# Patient Record
Sex: FEMALE | Race: WHITE | Employment: OTHER | URBAN - METROPOLITAN AREA
[De-identification: names, ages, dates, MRNs, and addresses within clinical notes are randomized per-mention and may not be internally consistent; named-entity substitution may affect disease eponyms.]

---

## 2018-10-18 PROBLEM — E11.8 TYPE 2 DIABETES MELLITUS WITH COMPLICATION, WITH LONG-TERM CURRENT USE OF INSULIN (HCC): Status: ACTIVE | Noted: 2018-10-18

## 2018-10-18 PROBLEM — H92.02 LEFT EAR PAIN: Status: ACTIVE | Noted: 2018-10-18

## 2018-10-18 PROBLEM — Z79.4 TYPE 2 DIABETES MELLITUS WITH COMPLICATION, WITH LONG-TERM CURRENT USE OF INSULIN (HCC): Status: ACTIVE | Noted: 2018-10-18

## 2021-07-23 LAB — PAP SMEAR, EXTERNAL: NEGATIVE

## 2021-09-01 ENCOUNTER — OFFICE VISIT (OUTPATIENT)
Dept: FAMILY MEDICINE CLINIC | Age: 61
End: 2021-09-01
Payer: COMMERCIAL

## 2021-09-01 VITALS
HEIGHT: 66 IN | SYSTOLIC BLOOD PRESSURE: 140 MMHG | TEMPERATURE: 98.1 F | OXYGEN SATURATION: 99 % | WEIGHT: 150.38 LBS | RESPIRATION RATE: 18 BRPM | DIASTOLIC BLOOD PRESSURE: 78 MMHG | BODY MASS INDEX: 24.17 KG/M2 | HEART RATE: 68 BPM

## 2021-09-01 DIAGNOSIS — E06.3 HYPOTHYROIDISM DUE TO HASHIMOTO'S THYROIDITIS: ICD-10-CM

## 2021-09-01 DIAGNOSIS — M54.42 CHRONIC RIGHT-SIDED LOW BACK PAIN WITH BILATERAL SCIATICA: Primary | ICD-10-CM

## 2021-09-01 DIAGNOSIS — M54.41 CHRONIC RIGHT-SIDED LOW BACK PAIN WITH BILATERAL SCIATICA: Primary | ICD-10-CM

## 2021-09-01 DIAGNOSIS — I10 ESSENTIAL HYPERTENSION: ICD-10-CM

## 2021-09-01 DIAGNOSIS — E03.8 HYPOTHYROIDISM DUE TO HASHIMOTO'S THYROIDITIS: ICD-10-CM

## 2021-09-01 DIAGNOSIS — E10.40 TYPE 1 DIABETES MELLITUS WITH DIABETIC NEUROPATHY (HCC): ICD-10-CM

## 2021-09-01 DIAGNOSIS — G89.29 CHRONIC RIGHT-SIDED LOW BACK PAIN WITH BILATERAL SCIATICA: Primary | ICD-10-CM

## 2021-09-01 PROBLEM — Z87.312 HISTORY OF STRESS FRACTURE: Status: ACTIVE | Noted: 2021-09-01

## 2021-09-01 PROBLEM — H92.02 LEFT EAR PAIN: Status: RESOLVED | Noted: 2018-10-18 | Resolved: 2021-09-01

## 2021-09-01 PROBLEM — R00.2 PALPITATIONS: Status: ACTIVE | Noted: 2021-09-01

## 2021-09-01 PROCEDURE — 99214 OFFICE O/P EST MOD 30 MIN: CPT | Performed by: NURSE PRACTITIONER

## 2021-09-01 RX ORDER — LANCETS
EACH MISCELLANEOUS
Qty: 200 EACH | Refills: 11 | Status: SHIPPED | OUTPATIENT
Start: 2021-09-01

## 2021-09-01 RX ORDER — PEN NEEDLE, DIABETIC 30 GX3/16"
NEEDLE, DISPOSABLE MISCELLANEOUS
Qty: 100 EACH | Refills: 11 | Status: SHIPPED | OUTPATIENT
Start: 2021-09-01

## 2021-09-01 RX ORDER — LISINOPRIL 10 MG/1
TABLET ORAL
Qty: 90 TABLET | Refills: 1 | Status: SHIPPED | OUTPATIENT
Start: 2021-09-01 | End: 2022-06-20

## 2021-09-01 RX ORDER — INSULIN LISPRO 100 [IU]/ML
INJECTION, SOLUTION INTRAVENOUS; SUBCUTANEOUS
Qty: 5 PEN | Refills: 0 | Status: SHIPPED | OUTPATIENT
Start: 2021-09-01

## 2021-09-01 RX ORDER — VERAPAMIL HYDROCHLORIDE 180 MG/1
180 TABLET, EXTENDED RELEASE ORAL
Qty: 90 TABLET | Refills: 1 | Status: SHIPPED | OUTPATIENT
Start: 2021-09-01 | End: 2021-12-09 | Stop reason: ALTCHOICE

## 2021-09-01 RX ORDER — INSULIN GLARGINE 100 [IU]/ML
INJECTION, SOLUTION SUBCUTANEOUS
Qty: 15 ML | Refills: 0 | Status: SHIPPED | OUTPATIENT
Start: 2021-09-01

## 2021-09-01 RX ORDER — LEVOTHYROXINE SODIUM 137 UG/1
TABLET ORAL
Qty: 90 TABLET | Refills: 0 | Status: SHIPPED | OUTPATIENT
Start: 2021-09-01 | End: 2022-07-08 | Stop reason: SDUPTHER

## 2021-09-01 RX ORDER — INSULIN PUMP SYRINGE, 3 ML
EACH MISCELLANEOUS
Qty: 1 KIT | Refills: 0 | Status: SHIPPED | OUTPATIENT
Start: 2021-09-01

## 2021-09-01 RX ORDER — LISINOPRIL 5 MG/1
TABLET ORAL
Qty: 90 TABLET | Refills: 1 | Status: SHIPPED | OUTPATIENT
Start: 2021-09-01 | End: 2021-11-29 | Stop reason: SDUPTHER

## 2021-09-01 RX ORDER — CHOLECALCIFEROL (VITAMIN D3) 125 MCG
2000 CAPSULE ORAL DAILY
Qty: 90 TABLET | Refills: 1 | Status: SHIPPED | OUTPATIENT
Start: 2021-09-01

## 2021-09-01 RX ORDER — MELOXICAM 7.5 MG/1
7.5 TABLET ORAL
Qty: 30 TABLET | Refills: 1 | Status: SHIPPED | OUTPATIENT
Start: 2021-09-01 | End: 2021-09-20

## 2021-09-01 NOTE — PROGRESS NOTES
Subjective:     Chief Complaint   Patient presents with    Back Pain     wants PT    Foot Pain    Diabetes     Sees Endocrine    Thyroid Problem     Sees Endocrine       Babs Lemus is a 59 y.o. female who presents today with multiple complaints. She is transferring her care from the [facility] today. She and her  moved down here full time earlier this year into their second home in [place]. They used to live in [place]. It has been a big adjustment and very stressful over the past few months. Still trying to sell their house in [place].    She is c/o chronic right lower back pain with bilateral sciatica, worse in the right leg and foot. Pain started back in February after she went sledding. Denies any fall or injury. Back in [place] she saw a spine specialist who ordered an MRI. She was told she has multiple bulging discs at L3-5. She was offered a steroid injection but declined due to her diabetes. She was advised to do PT but never did. She was also advised to take aleve prn pain. Pt states she does not take the Aleve often because it causes her BP to go up and it \"destroys her stomach\" if she takes it too frequently. Has tried heating pad, ice pack, and yoga exercises but pain will not go away. Has been seeing a chiropractor which has helped some, her right foot was swollen for a while and after her visit with him the swelling dramatically improved. She used to walk almost 2 miles a day with her dog for excercise but has been unable to these days due to pain. She is ambulating with a cane today. She mentions she has OA in both hips. She also mentions she had a stress fx in the left foot back in 2013, had a DEXA done - unsure of results but she does take Vit D 2000 units daily. She has HTN with hx of palpitations and was followed by cardiology in [place]. She would like to see a cardiologist locally. She takes Verapamil 180mg daily and Lisinopril 15mg daily.  Could not tolerate Losartan. Follows a low sodium diet. Occasional lightheadedness when her BP drops too low. BP today slightly elevated at 142/65. It was rechecked manually at 140/78. She states she has white coat syndrome and is stressed today because of the bad weather. She checks it at home and readings are usually much lower. She has type 1 diabetes, diagnosed as a child. She is followed by ENDO. She is on insulin. She believes her last A1C was in the low 7's and her ENDO is ok with this due to her age. She was having episodes of hypoglycemia. She has some numbness and tingling in both feet. Takes Lipoic acid. Tries to follow a strict low carb, sugar free diet. She does have 2 glasses of wine every evening and does not wish to cut back. She has hx of Hashimoto's hypothyroidism and is followed by ENDO. She is currently taking Levothyroxine 137mcg daily 6 days per week. Believes her last TSH was checked in June. Plans on finding a new endocrinologist in the area.     Healthcare maintenance  She is hesitant to get the Covid vaccine, worried about adverse reaction given her autoimmune disorder, the fact that she had a bad experience with flu shot in the past, and anaphylactix to iodine contrast.     Patient Active Problem List   Diagnosis Code    MVP (mitral valve prolapse) I34.1    Essential hypertension I10    Type 1 diabetes mellitus with diabetic neuropathy ([facility] [address] E10.40    Hypothyroidism due to Hashimoto's thyroiditis E03.8, E06.3    Palpitations R00.2    Chronic right-sided low back pain with bilateral sciatica M54.41, M54.42, G89.29    History of stress fracture Z87.312         Past Medical History:   Diagnosis Date    Diabetes ([address]     Hypertension     Hypothyroidism due to Hashimoto's thyroiditis 1999         Current Outpatient Medications:     lisinopriL (PRINIVIL, ZESTRIL) 5 mg tablet, , Disp: , Rfl:     Insulin Needles, Disposable, (NovoFine 30) 30 gauge x 1/3\", NovoFine 30 30 gauge x 1/3\" needle, Disp: , Rfl:     ONETOUCH ULTRA BLUE TEST STRIP strip, , Disp: , Rfl:     insulin glargine (LANTUS) 100 unit/mL injection, 18 Units by SubCUTAneous route daily. , Disp: , Rfl:     insulin lispro (HUMALOG U-100 INSULIN) 100 unit/mL injection, by SubCUTAneous route., Disp: , Rfl:     levothyroxine (SYNTHROID) 137 mcg tablet, Take  by mouth Daily (before breakfast). , Disp: , Rfl:     verapamil ER (CALAN-SR) 180 mg CR tablet, Take 180 mg by mouth nightly., Disp: , Rfl:     cholecalciferol, vitamin D3, (VITAMIN D3) 2,000 unit tab, Take 2,000 Units by mouth daily. , Disp: , Rfl:     PREMARIN 0.625 mg/gram vaginal cream, , Disp: , Rfl:     magnesium 250 mg tab, Take 250 mg by mouth daily. (Patient not taking: Reported on 2021), Disp: , Rfl:     Allergies   Allergen Reactions    Iodine Anaphylaxis    Sulfa (Sulfonamide Antibiotics) Angioedema       Past Surgical History:   Procedure Laterality Date    HX CARPAL TUNNEL RELEASE      HX  SECTION         Social History     Tobacco Use   Smoking Status Never Smoker   Smokeless Tobacco Never Used       Social History     Socioeconomic History    Marital status:      Spouse name: Not on file    Number of children: Not on file    Years of education: Not on file    Highest education level: Not on file   Tobacco Use    Smoking status: Never Smoker    Smokeless tobacco: Never Used   Substance and Sexual Activity    Alcohol use:  Yes     Alcohol/week: 0.8 standard drinks     Types: 1 Glasses of wine per week    Drug use: Never    Sexual activity: Yes   Other Topics Concern     Service No    Blood Transfusions No    Caffeine Concern No    Occupational Exposure No    Hobby Hazards No    Sleep Concern No    Stress Concern No    Weight Concern No    Special Diet No    Back Care No    Exercise Yes    Bike Helmet No    Seat Belt Yes    Self-Exams Yes     Social Determinants of Health     Financial Resource Strain:     Difficulty of Paying Living Expenses:    Food Insecurity:     Worried About Running Out of Food in the Last Year:     [address] in the Last Year:    Transportation Needs:     Lack of Transportation (Medical):  Lack of Transportation (Non-Medical):    Physical Activity:     Days of Exercise per Week:     Minutes of Exercise per Session:    Stress:     Feeling of Stress :    Social Connections:     Frequency of Communication with Friends and Family:     Frequency of Social Gatherings with Friends and Family:     Attends Zoroastrian Services:     Active Member of Clubs or Organizations:     Attends Club or Organization Meetings:     Marital Status:        Family History   Problem Relation Age of Onset    Asthma Father     Diabetes Father     Heart Disease Father     Hypertension Father     Hypertension Sister     Diabetes Son        ROS:  Gen: denies fever, chills, or fatigue  HEENT:denies H/A, nasal congestion, or sore throat  Resp: denies dyspnea, cough, or wheezing  CV: denies chest pain or pressure, +occas palpitations  Extremeties: +previous edema in left foot- now resolved after chiropractor visit, no pallor, or cyanosis  Musculoskeletal: +chronic right low back pain with bilateral sciatica and muscle cramps   Neuro: + numbness/tingling in BLE's , +occas dizziness  Skin: denies rashes or new lesions   Psych: denies anxiety, depression, kierra, or other changes in mood      Objective:     Visit Vitals  BP (!) 140/78   Pulse 68   Temp 98.1 °F (36.7 °C) (Temporal)   Resp 18   Ht 5' 6\" (1.676 m)   Wt 150 lb 6 oz (68.2 kg)   SpO2 99%   BMI 24.27 kg/m²       Body mass index is 24.27 kg/m². General: Alert and oriented. +Wincing in pain when she moves in her chair. Well nourished. HEENT :  Eyes: Sclera white, conjunctiva clear. PERRLA. Extra ocular movements intact. Nose: Patent. Neck: Supple with FROM. Lungs: Breathing even and unlabored.  All lobes clear to auscultation bilaterally Heart :RRR, S1 and S2 normal intensity, no extra heart sounds  Extremities: Non-edematous, no pallor or cyanosis. Feet are warm  Back: + tenderness to palpation over the lumbar spine. No paraspinal tenderness. Limited ROM due to pain. Musculoskeletal: No joint heat, erythema, or swelling. FROM in all joints. Neuro: Cranial nerves grossly normal.  Sensory: Sensation slightly decreased to lower legs. Motor: Strength 5 over 5 and symmetrical in BLE's. No tremor. Psych: Mood and thought content appropriate for situation. Dressed appropriately and with good hygiene. Skin: Warm, dry, and intact. No lesions or discoloration. Assessment/ Plan:     Chronic right lower back pain with bilateral sciatica  Try Meloxicam 7.5mg daily- side effects reviewed  Cont ice pack, heating pad, and yoga  May cont to see chiropractor  Referred to PT- Naresh in [place]  F/U 1 month    HTN  BP borderline  She reports white coat syndrome  Cont Current meds  Cont low sodium diet  Cont to check BP at home and notify provider if >140/90  Go to ER or RTO for CP, SOB, dizziness, or swelling  She would like to see a cardiologist- she will call Dr Bev Bolton office and ask if they take her insurance and if they do she will let us know and we will send over a referral order.  If not in network, she will check with her insurance company  F/U 1 month, will check CMP    Hypothyroidism  Cont current dose of Levothyroxine  She is working on finding an Endo in the area  F/U 1 month, will recheck TSH    Type 1 diabetes  Stable  Cont current insulin regimen:  Lantus 18 units SQ daily  Humalog- TID qAC according to sliding scale  Script sent for glucometer, test strips, and lancets per pt request  Check glucose 4-5 times daily  Cont diabetic diet  Call for any problems  F/U 1 month to recheck CBC, CMP, A1C, and screen for microalbumin    Healthcare maintenance  Due to time constraints we did not address date of last PAP, colonoscopy, or mammogram. Will inquire about these at follow up appt      Verbal and written instructions (see AVS) provided.  Patient expresses understanding of diagnosis and treatment plan. Health Maintenance Due   Topic Date Due    Hepatitis C Screening  Never done    Foot Exam Q1  Never done    A1C test (Diabetic or Prediabetic)  Never done    MICROALBUMIN Q1  Never done    Eye Exam Retinal or Dilated  Never done    Lipid Screen  Never done    COVID-19 Vaccine (1) Never done    PAP AKA CERVICAL CYTOLOGY  Never done    Colorectal Cancer Screening Combo  Never done    Shingrix Vaccine Age 50> (1 of 2) Never done    Breast Cancer Screen Mammogram  Never done    DTaP/Tdap/Td series (1 - Tdap) 07/21/2020    Flu Vaccine (1) Never done               Amol Soria, JENY

## 2021-09-01 NOTE — PROGRESS NOTES
1. Have you been to the ER, urgent care clinic since your last visit? Hospitalized since your last visit? No    2. Have you seen or consulted any other health care providers outside of the [address] [place] [address] since your last visit? Include any pap smears or colon screening. Dr. Bro Strong, Dr. Trevon Ernst, Dr. Roselia Roblero.     Chief Complaint   Patient presents with    Back Pain     wants PT    Foot Pain    Diabetes     Sees Endocrine    Thyroid Problem     Sees Endocrine     Visit Vitals  BP (!) 142/65 (BP 1 Location: Left arm, BP Patient Position: Sitting)   Pulse 68   Temp 98.1 °F (36.7 °C) (Temporal)   Resp 18   Ht 5' 6\" (1.676 m)   Wt 150 lb 6 oz (68.2 kg)   SpO2 99%   BMI 24.27 kg/m²

## 2021-09-01 NOTE — PATIENT INSTRUCTIONS
Learning About How to Have a Healthy Back  What causes back pain? Back pain is often caused by overuse, strain, or injury. For example, people often hurt their backs playing sports or working in the yard, being jolted in a car accident, or lifting something too heavy. Aging plays a part too. Your bones and muscles tend to lose strength as you age, which makes injury more likely. The spongy discs between the bones of the spine (vertebrae) may suffer from wear and tear and no longer provide enough cushion between the bones. A disc that bulges or breaks open (herniated disc) can press on nerves, causing back pain. In some people, back pain is the result of arthritis, broken vertebrae caused by bone loss (osteoporosis), illness, or a spine problem. Although most people have back pain at one time or another, there are steps you can take to make it less likely. How can you have a healthy back? Reduce stress on your back through good posture   Slumping or slouching alone may not cause low back pain. But after the back has been strained or injured, bad posture can make pain worse. · Sleep in a position that maintains your back's normal curves and on a mattress that feels comfortable. Sleep on your side with a pillow between your knees, or sleep on your back with a pillow under your knees. These positions can reduce strain on your back. · Stand and sit up straight. \"Good posture\" generally means your ears, shoulders, and hips are in a straight line. · If you must stand for a long time, put one foot on a stool, ledge, or box. Switch feet every now and then. · Sit in a chair that is low enough to let you place both feet flat on the floor with both knees nearly level with your hips. If your chair or desk is too high, use a footrest to raise your knees. Place a small pillow, a rolled-up towel, or a lumbar roll in the curve of your back if you need extra support.   · Try a kneeling chair, which helps tilt your hips forward. This takes pressure off your lower back. · Try sitting on an exercise ball. It can rock from side to side, which helps keep your back loose. · When driving, keep your knees nearly level with your hips. Sit straight, and drive with both hands on the steering wheel. Your arms should be in a slightly bent position. Reduce stress on your back through careful lifting   · Squat down, bending at the hips and knees only. If you need to, put one knee to the floor and extend your other knee in front of you, bent at a right angle (half kneeling). · Press your chest straight forward. This helps keep your upper back straight while keeping a slight arch in your low back. · Hold the load as close to your body as possible, at the level of your belly button (navel). · Use your feet to change direction, taking small steps. · Lead with your hips as you change direction. Keep your shoulders in line with your hips as you move. · Set down your load carefully, squatting with your knees and hips only. Exercise and stretch your back   · Do some exercise on most days of the week, if your doctor says it is okay. You can walk, run, swim, or cycle. · Stretch your back muscles. Here are a few exercises to try:  ? Lie on your back, and gently pull one bent knee to your chest. Put that foot back on the floor, and then pull the other knee to your chest.  ? Do pelvic tilts. Lie on your back with your knees bent. Tighten your stomach muscles. Pull your belly button (navel) in and up toward your ribs. You should feel like your back is pressing to the floor and your hips and pelvis are slightly lifting off the floor. Hold for 6 seconds while breathing smoothly. ? Sit with your back flat against a wall. · Keep your core muscles strong. The muscles of your back, belly (abdomen), and buttocks support your spine. ? Pull in your belly and imagine pulling your navel toward your spine. Hold this for 6 seconds, then relax.  Remember to keep breathing normally as you tense your muscles. ? Do curl-ups. Always do them with your knees bent. Keep your low back on the floor, and curl your shoulders toward your knees using a smooth, slow motion. Keep your arms folded across your chest. If this bothers your neck, try putting your hands behind your neck (not your head), with your elbows spread apart. ? Lie on your back with your knees bent and your feet flat on the floor. Tighten your belly muscles, and then push with your feet and raise your buttocks up a few inches. Hold this position 6 seconds as you continue to breathe normally, then lower yourself slowly to the floor. Repeat 8 to 12 times. ? If you like group exercise, try Pilates or yoga. These classes have poses that strengthen the core muscles. Lead a healthy lifestyle   · Stay at a healthy weight to avoid strain on your back. · Do not smoke. Smoking increases the risk of osteoporosis, which weakens the spine. If you need help quitting, talk to your doctor about stop-smoking programs and medicines. These can increase your chances of quitting for good. Where can you learn more? Go to http://www.[employer].com/  Enter L315 in the search box to learn more about \"Learning About How to Have a Healthy Back. \"  Current as of: November 16, 2020               Content Version: 12.8  © 8943-7247 Healthwise, Incorporated. Care instructions adapted under license by [employer] (which disclaims liability or warranty for this information). If you have questions about a medical condition or this instruction, always ask your healthcare professional. [place] [address] any warranty or liability for your use of this information.

## 2021-09-03 ENCOUNTER — DOCUMENTATION ONLY (OUTPATIENT)
Dept: FAMILY MEDICINE CLINIC | Age: 61
End: 2021-09-03

## 2021-09-08 ENCOUNTER — TELEPHONE (OUTPATIENT)
Dept: FAMILY MEDICINE CLINIC | Age: 61
End: 2021-09-08

## 2021-09-08 DIAGNOSIS — I10 ESSENTIAL HYPERTENSION: Primary | ICD-10-CM

## 2021-09-08 DIAGNOSIS — R00.2 PALPITATIONS: ICD-10-CM

## 2021-09-08 DIAGNOSIS — E10.40 TYPE 1 DIABETES MELLITUS WITH DIABETIC NEUROPATHY (HCC): Primary | ICD-10-CM

## 2021-09-08 NOTE — TELEPHONE ENCOUNTER
Dr. Crecencio Hodgkins, Kameron Monzon and Timoteo, [facility], [address] [place] [address]. [phone]. He goes up to Dr. Deirdre Colbert office once a month.

## 2021-09-08 NOTE — TELEPHONE ENCOUNTER
Pt called. She has made a appointment with Dr. Breanne Krishnamurthy here in [place]. Her appointment is 09/20/21. She needs a referral sent to him.

## 2021-09-08 NOTE — TELEPHONE ENCOUNTER
Pt also would like a referral to see Dr. Donna Patel (optimologist) in [place]. Her appointment is on 11/15/21.

## 2021-09-09 ENCOUNTER — DOCUMENTATION ONLY (OUTPATIENT)
Dept: FAMILY MEDICINE CLINIC | Age: 61
End: 2021-09-09

## 2021-09-10 ENCOUNTER — TELEPHONE (OUTPATIENT)
Dept: FAMILY MEDICINE CLINIC | Age: 61
End: 2021-09-10

## 2021-09-10 DIAGNOSIS — M79.89 SWELLING OF RIGHT FOOT: Primary | ICD-10-CM

## 2021-09-13 ENCOUNTER — HOSPITAL ENCOUNTER (OUTPATIENT)
Dept: GENERAL RADIOLOGY | Age: 61
Discharge: HOME | End: 2021-09-13
Payer: COMMERCIAL

## 2021-09-13 DIAGNOSIS — M79.89 SWELLING OF RIGHT FOOT: ICD-10-CM

## 2021-09-13 PROCEDURE — 73630 X-RAY EXAM OF FOOT: CPT

## 2021-09-13 NOTE — TELEPHONE ENCOUNTER
Could be related to nerve inflammation from her back problem but will get xray to r/o stress fx.  Order placed

## 2021-09-14 ENCOUNTER — VIRTUAL VISIT (OUTPATIENT)
Dept: FAMILY MEDICINE CLINIC | Age: 61
End: 2021-09-14
Payer: COMMERCIAL

## 2021-09-14 DIAGNOSIS — E10.40 TYPE 1 DIABETES MELLITUS WITH DIABETIC NEUROPATHY (HCC): ICD-10-CM

## 2021-09-14 DIAGNOSIS — E06.3 HYPOTHYROIDISM DUE TO HASHIMOTO'S THYROIDITIS: ICD-10-CM

## 2021-09-14 DIAGNOSIS — E10.40 TYPE 1 DIABETES MELLITUS WITH DIABETIC NEUROPATHY (HCC): Primary | ICD-10-CM

## 2021-09-14 DIAGNOSIS — I99.8 VASCULAR CALCIFICATION: Primary | ICD-10-CM

## 2021-09-14 DIAGNOSIS — E03.8 HYPOTHYROIDISM DUE TO HASHIMOTO'S THYROIDITIS: ICD-10-CM

## 2021-09-14 DIAGNOSIS — I10 ESSENTIAL HYPERTENSION: ICD-10-CM

## 2021-09-14 DIAGNOSIS — I99.8 VASCULAR CALCIFICATION: ICD-10-CM

## 2021-09-14 PROCEDURE — 99213 OFFICE O/P EST LOW 20 MIN: CPT | Performed by: NURSE PRACTITIONER

## 2021-09-14 RX ORDER — LOVASTATIN 20 MG/1
20 TABLET ORAL
Qty: 30 TABLET | Refills: 2 | Status: SHIPPED | OUTPATIENT
Start: 2021-09-14 | End: 2021-09-20

## 2021-09-14 NOTE — PROGRESS NOTES
Shante Guillen is a 59 y.o. female evaluated via telephone on 9/14/2021. Consent:  She and/or health care decision maker is aware that that she may receive a bill for this telephone service, depending on her insurance coverage, and has provided verbal consent to proceed: Yes    CC: discuss meds    HPI:  Mrs. Cherry Hunter is a 59yo female who presents via telephone encounter to discuss meds. She was told she has vascular calcifications in her foot that was shown on an xray and suggested to start a statin given her type 1 diabetes. However she is afraid to take a statin due to fear of side effects. States she will discuss this with her cardiologist Dr Blayne Virgen. Mentions 3 days ago she had a sudden onset of weakness, shakiness, and diaphoresis 15 minutes after giving herself 2 units of mealtime insulin. Did not check her BS but drank something sweet and called 911. She was home alone. Upon EMS arrival they checked her sugar, it was 150. EKG was \"fine. \" She believes her BP was elevated in the 150's. I informed her this was most likely a hypoglycemic event but she disagrees, states that has never happened before. Believes it may be related to receiving the 1st covid vaccine last week or the pharmacy switching her Verapamil from capsules to tablets. Wants a \"full panel\" of bloodwork done. PLAN    Vascular calcifications  She is not comfortable starting a statin at this time  Will discuss with cardiologist    Will order labs to complement her previous visit on 9-1-21. Will need to have them drawn at hospital due to [facility] Diagnostics\" lab required for lab orders. Documentation:  I communicated with the patient and/or health care decision maker about the plan of care as noted above. I affirm this is a Patient Initiated Episode with an Established Patient who has not had a related appointment within my department in the past 7 days or scheduled within the next 24 hours.     Total Time: minutes: 11-20 minutes    Note: not billable if this call serves to triage the patient into an appointment for the relevant concern      Orlin Lau NP

## 2021-09-14 NOTE — PROGRESS NOTES
1. Have you been to the ER, urgent care clinic since your last visit? Hospitalized since your last visit? No    2. Have you seen or consulted any other health care providers outside of the [address] [place] [address] since your last visit? Include any pap smears or colon screening.  No

## 2021-09-14 NOTE — PROGRESS NOTES
Xray of foot is normal other than vascular calcifications which are mineral deposits in the blood vessels. Would recommend she take a statin to prevent plaque build up especially given her diabetes.  Let me know if she would like to try

## 2021-09-15 ENCOUNTER — HOSPITAL ENCOUNTER (OUTPATIENT)
Dept: LAB | Age: 61
Discharge: HOME | End: 2021-09-15
Payer: COMMERCIAL

## 2021-09-15 LAB
25(OH)D3 SERPL-MCNC: 61.4 NG/ML (ref 30–100)
ALBUMIN SERPL-MCNC: 3.8 G/DL (ref 3.5–5)
ALBUMIN/GLOB SERPL: 1.1 {RATIO} (ref 1.1–2.2)
ALP SERPL-CCNC: 72 U/L (ref 45–117)
ALT SERPL-CCNC: 28 U/L (ref 12–78)
ANION GAP SERPL CALC-SCNC: 5 MMOL/L (ref 5–15)
AST SERPL-CCNC: 19 U/L (ref 15–37)
BASOPHILS # BLD: 0.1 K/UL (ref 0–0.1)
BASOPHILS NFR BLD: 1 % (ref 0–1)
BILIRUB SERPL-MCNC: 0.5 MG/DL (ref 0.2–1)
BUN SERPL-MCNC: 11 MG/DL (ref 6–20)
BUN/CREAT SERPL: 17 (ref 12–20)
CALCIUM SERPL-MCNC: 9.1 MG/DL (ref 8.5–10.1)
CHLORIDE SERPL-SCNC: 97 MMOL/L (ref 97–108)
CHOLEST SERPL-MCNC: 159 MG/DL
CO2 SERPL-SCNC: 31 MMOL/L (ref 21–32)
CREAT SERPL-MCNC: 0.63 MG/DL (ref 0.55–1.02)
DIFFERENTIAL METHOD BLD: NORMAL
EOSINOPHIL # BLD: 0.2 K/UL (ref 0–0.4)
EOSINOPHIL NFR BLD: 2 % (ref 0–7)
ERYTHROCYTE [DISTWIDTH] IN BLOOD BY AUTOMATED COUNT: 11.9 % (ref 11.5–14.5)
EST. AVERAGE GLUCOSE BLD GHB EST-MCNC: 166 MG/DL
GLOBULIN SER CALC-MCNC: 3.4 G/DL (ref 2–4)
GLUCOSE SERPL-MCNC: 138 MG/DL (ref 65–100)
HBA1C MFR BLD: 7.4 % (ref 4–5.6)
HCT VFR BLD AUTO: 40.2 % (ref 35–47)
HDLC SERPL-MCNC: 79 MG/DL
HDLC SERPL: 2 {RATIO} (ref 0–5)
HGB BLD-MCNC: 13.6 G/DL (ref 11.5–16)
IMM GRANULOCYTES # BLD AUTO: 0 K/UL (ref 0–0.04)
IMM GRANULOCYTES NFR BLD AUTO: 0 % (ref 0–0.5)
LDLC SERPL CALC-MCNC: 68.4 MG/DL (ref 0–100)
LYMPHOCYTES # BLD: 1.5 K/UL (ref 0.8–3.5)
LYMPHOCYTES NFR BLD: 21 % (ref 12–49)
MCH RBC QN AUTO: 32.4 PG (ref 26–34)
MCHC RBC AUTO-ENTMCNC: 33.8 G/DL (ref 30–36.5)
MCV RBC AUTO: 95.7 FL (ref 80–99)
MONOCYTES # BLD: 0.8 K/UL (ref 0–1)
MONOCYTES NFR BLD: 11 % (ref 5–13)
NEUTS SEG # BLD: 4.6 K/UL (ref 1.8–8)
NEUTS SEG NFR BLD: 65 % (ref 32–75)
NRBC # BLD: 0 K/UL (ref 0–0.01)
NRBC BLD-RTO: 0 PER 100 WBC
PLATELET # BLD AUTO: 264 K/UL (ref 150–400)
PMV BLD AUTO: 9.7 FL (ref 8.9–12.9)
POTASSIUM SERPL-SCNC: 4.3 MMOL/L (ref 3.5–5.1)
PROT SERPL-MCNC: 7.2 G/DL (ref 6.4–8.2)
RBC # BLD AUTO: 4.2 M/UL (ref 3.8–5.2)
SODIUM SERPL-SCNC: 133 MMOL/L (ref 136–145)
TRIGL SERPL-MCNC: 58 MG/DL (ref ?–150)
TSH SERPL DL<=0.05 MIU/L-ACNC: 1.75 UIU/ML (ref 0.36–3.74)
VLDLC SERPL CALC-MCNC: 11.6 MG/DL
WBC # BLD AUTO: 7.1 K/UL (ref 3.6–11)

## 2021-09-15 PROCEDURE — 80061 LIPID PANEL: CPT

## 2021-09-15 PROCEDURE — 85025 COMPLETE CBC W/AUTO DIFF WBC: CPT

## 2021-09-15 PROCEDURE — 84443 ASSAY THYROID STIM HORMONE: CPT

## 2021-09-15 PROCEDURE — 80053 COMPREHEN METABOLIC PANEL: CPT

## 2021-09-15 PROCEDURE — 83036 HEMOGLOBIN GLYCOSYLATED A1C: CPT

## 2021-09-15 PROCEDURE — 36415 COLL VENOUS BLD VENIPUNCTURE: CPT

## 2021-09-15 PROCEDURE — 82306 VITAMIN D 25 HYDROXY: CPT

## 2021-09-15 NOTE — PROGRESS NOTES
TSH in normal range. Cholesterol is normal. Kidney and liver function normal. A1C 7.4%. CBC normal (no anemia). Sodium just slightly decreased. If she wants to recheck her sodium level in a few days I can place another lab order.  Sometimes BP meds can cause this

## 2021-09-20 ENCOUNTER — OFFICE VISIT (OUTPATIENT)
Dept: CARDIOLOGY CLINIC | Age: 61
End: 2021-09-20
Payer: COMMERCIAL

## 2021-09-20 ENCOUNTER — CLINICAL SUPPORT (OUTPATIENT)
Dept: CARDIOLOGY CLINIC | Age: 61
End: 2021-09-20
Payer: COMMERCIAL

## 2021-09-20 VITALS
OXYGEN SATURATION: 100 % | DIASTOLIC BLOOD PRESSURE: 76 MMHG | HEIGHT: 66 IN | SYSTOLIC BLOOD PRESSURE: 150 MMHG | BODY MASS INDEX: 23.78 KG/M2 | HEART RATE: 80 BPM | WEIGHT: 148 LBS | RESPIRATION RATE: 18 BRPM | TEMPERATURE: 97.3 F

## 2021-09-20 DIAGNOSIS — E78.5 DYSLIPIDEMIA: ICD-10-CM

## 2021-09-20 DIAGNOSIS — I10 ESSENTIAL HYPERTENSION: Primary | ICD-10-CM

## 2021-09-20 DIAGNOSIS — E03.8 HYPOTHYROIDISM DUE TO HASHIMOTO'S THYROIDITIS: ICD-10-CM

## 2021-09-20 DIAGNOSIS — E06.3 HYPOTHYROIDISM DUE TO HASHIMOTO'S THYROIDITIS: ICD-10-CM

## 2021-09-20 DIAGNOSIS — E10.40 TYPE 1 DIABETES MELLITUS WITH DIABETIC NEUROPATHY (HCC): ICD-10-CM

## 2021-09-20 DIAGNOSIS — G89.29 CHRONIC RIGHT-SIDED LOW BACK PAIN WITH BILATERAL SCIATICA: ICD-10-CM

## 2021-09-20 DIAGNOSIS — M54.42 CHRONIC RIGHT-SIDED LOW BACK PAIN WITH BILATERAL SCIATICA: ICD-10-CM

## 2021-09-20 DIAGNOSIS — R55 SYNCOPE AND COLLAPSE: ICD-10-CM

## 2021-09-20 DIAGNOSIS — M79.604 PAIN IN BOTH LOWER EXTREMITIES: ICD-10-CM

## 2021-09-20 DIAGNOSIS — M54.41 CHRONIC RIGHT-SIDED LOW BACK PAIN WITH BILATERAL SCIATICA: ICD-10-CM

## 2021-09-20 DIAGNOSIS — I73.9 PAD (PERIPHERAL ARTERY DISEASE) (HCC): ICD-10-CM

## 2021-09-20 DIAGNOSIS — R00.2 PALPITATIONS: ICD-10-CM

## 2021-09-20 DIAGNOSIS — M79.605 PAIN IN BOTH LOWER EXTREMITIES: ICD-10-CM

## 2021-09-20 PROCEDURE — 93000 ELECTROCARDIOGRAM COMPLETE: CPT | Performed by: INTERNAL MEDICINE

## 2021-09-20 PROCEDURE — 99204 OFFICE O/P NEW MOD 45 MIN: CPT | Performed by: INTERNAL MEDICINE

## 2021-09-20 PROCEDURE — 3051F HG A1C>EQUAL 7.0%<8.0%: CPT | Performed by: INTERNAL MEDICINE

## 2021-09-20 NOTE — PROGRESS NOTES
HOME  hook up  HOLTER  monitor only. Verified patient with two patient identifiers. Patient verbalized understanding of its use. Ordering EDMOND Arvizu  Reason:  Palpitations [R00.2 (ICD-10-CM)]; Syncope and collapse [place].Plush (ICD-10-CM)]      Patient has been successfully enrolled through [employer]. No LOS.

## 2021-09-20 NOTE — PROGRESS NOTES
Adrián Butler is a 59 y.o. female is here to establish local cardiac care. Hx IDDM with neuropathy, hypertension, dyslipidemia, thyroid disease, palpitations, MV prolapse, low back pain w/ sciatica, DJD hips followed by Jamie Julian NP with recent OV/labs. Moved here from [place], previously followed by PCP. Endocrine and Cardiology there. Recent foot pain/issues, prior stress fx and had Xrays which showed vascular calcifications. GIven rx for lovastatin (not taking), recent lipids as noted at target. Normal CBC, CMP, TSH. HbA1c 7.4. Occasional palpitations, had syncopal episode 2 weeks after receiving [employer] SL. Nausea/diaphoresis, weak/dizzy. Had Echo approx 2 yrs ago--reportedly ok. The patient denies chest pain/ shortness of breath, orthopnea, PND, LE edema,  presyncope or fatigue.        Patient Active Problem List    Diagnosis Date Noted    Hypothyroidism due to Hashimoto's thyroiditis 2021    Palpitations 2021    Chronic right-sided low back pain with bilateral sciatica 2021    History of stress fracture 2021    Type 1 diabetes mellitus with diabetic neuropathy ([facility] [address] 10/18/2018    MVP (mitral valve prolapse) 2015    Essential hypertension 2015      Kamran Giron NP  Past Medical History:   Diagnosis Date    Diabetes ([facility] [address]     Hypertension     Hypothyroidism due to Hashimoto's thyroiditis       Past Surgical History:   Procedure Laterality Date    HX CARPAL TUNNEL RELEASE      HX  SECTION       Allergies   Allergen Reactions    Iodine Anaphylaxis    Sulfa (Sulfonamide Antibiotics) Angioedema      Family History   Problem Relation Age of Onset    Asthma Father     Diabetes Father     Heart Disease Father     Hypertension Father     Hypertension Sister     Diabetes Son       Social History     Socioeconomic History    Marital status:      Spouse name: Not on file    Number of children: Not on file    Years of education: Not on file    Highest education level: Not on file   Occupational History    Not on file   Tobacco Use    Smoking status: Never Smoker    Smokeless tobacco: Never Used   Substance and Sexual Activity    Alcohol use: Yes     Alcohol/week: 0.8 standard drinks     Types: 1 Glasses of wine per week    Drug use: Never    Sexual activity: Yes   Other Topics Concern     Service No    Blood Transfusions No    Caffeine Concern No    Occupational Exposure No    Hobby Hazards No    Sleep Concern No    Stress Concern No    Weight Concern No    Special Diet No    Back Care No    Exercise Yes    Bike Helmet No    Seat Belt Yes    Self-Exams Yes   Social History Narrative    Not on file     Social Determinants of Health     Financial Resource Strain:     Difficulty of Paying Living Expenses:    Food Insecurity:     Worried About Running Out of Food in the Last Year:     Ran Out of Food in the Last Year:    Transportation Needs:     Lack of Transportation (Medical):      Lack of Transportation (Non-Medical):    Physical Activity:     Days of Exercise per Week:     Minutes of Exercise per Session:    Stress:     Feeling of Stress :    Social Connections:     Frequency of Communication with Friends and Family:     Frequency of Social Gatherings with Friends and Family:     Attends Holiness Services:     Active Member of Clubs or Organizations:     Attends Club or Organization Meetings:     Marital Status:    Intimate Partner Violence:     Fear of Current or Ex-Partner:     Emotionally Abused:     Physically Abused:     Sexually Abused:       Current Outpatient Medications   Medication Sig    lisinopriL (PRINIVIL, ZESTRIL) 5 mg tablet Take 1 pill daily with a 10mg daily for total daily dose 15mg    lisinopriL (PRINIVIL, ZESTRIL) 10 mg tablet Take 1 pill daily with a 5mg daily for total daily dose 15mg    insulin glargine (LANTUS,BASAGLAR) 100 unit/mL (3 mL) inpn Inject 18 units SQ every morning    Insulin Needles, Disposable, 31 gauge x 5/16\" ndle Inject daily with insulin up to 4 times daily    Blood-Glucose Meter monitoring kit Check sugar up to 5 times daily, DX: E10.40    glucose blood VI test strips (ASCENSIA AUTODISC VI, ONE TOUCH ULTRA TEST VI) strip Check sugar up to 5 times daily, DX: E10.40    lancets misc Check sugar up to 5 times daily, DX: E10.40    levothyroxine (SYNTHROID) 137 mcg tablet Take 1 pill every morning 6 days per week    verapamil ER (CALAN-SR) 180 mg CR tablet Take 1 Tablet by mouth nightly. (Patient taking differently: Take 180 mg by mouth nightly. Pt reports that she has capsules.)    cholecalciferol, vitamin D3, (Vitamin D3) 50 mcg (2,000 unit) tab Take 1 Tablet by mouth daily.  insulin lispro (HUMALOG) 100 unit/mL kwikpen Inject three times daily before meals according to sliding scale. Max dose 10 units    lovastatin (MEVACOR) 20 mg tablet Take 1 Tablet by mouth nightly. (Patient not taking: Reported on 9/20/2021)    meloxicam (MOBIC) 7.5 mg tablet Take 1 Tablet by mouth daily as needed for Pain. (Patient not taking: Reported on 9/20/2021)     No current facility-administered medications for this visit. Review of Symptoms:    CONST  No weight change. No fever, chills, sweats    ENT No visual changes, URI sx, sore throat    CV  See HPI   RESP  No cough, or sputum, wheezing. Also see HPI   GI  No abdominal pain or change in bowel habits. No heartburn or dysphagia. No melena or rectal bleeding.   No dysuria, urgency, frequency, hematuria   MSKEL  +hip, foot, low back pain   No muscle pain. SKIN  No rash or lesions. NEURO  No headache, syncope, or seizure. No weakness, loss of sensation, or paresthesias. PSYCH  No low mood or depression  No anxiety. HE/LYMPH  No easy bruising, abnormal bleeding, or enlarged glands.         Physical ExamPhysical Exam:    Visit Vitals  BP (!) 150/76 (BP 1 Location: Left upper arm, BP Patient Position: Sitting) Comment (BP Cuff Size): pts electronic cuff. Pulse 80   Temp 97.3 °F (36.3 °C) (Temporal)   Resp 18   Ht 5' 6\" (1.676 m)   Wt 148 lb (67.1 kg)   SpO2 100% Comment: ra   BMI 23.89 kg/m²     Gen: NAD  HEENT:  PERRL, throat clear  Neck: no adenopathy, no thyromegaly, no JVD   Heart:  Regular,Nl S1S2,  no murmur, gallop or rub. Lungs:  clear  Abdomen:   Soft, non-tender, bowel sounds are active. Extremities:  No edema  Pulse: symmetric  Neuro: A&O times 3, No focal neuro deficits    Cardiographics    ECG: NSR, low voltage, otherwise normal      Labs:   Lab Results   Component Value Date/Time    Sodium 133 (L) 09/15/2021 11:45 AM    Potassium 4.3 09/15/2021 11:45 AM    Chloride 97 09/15/2021 11:45 AM    CO2 31 09/15/2021 11:45 AM    Anion gap 5 09/15/2021 11:45 AM    Glucose 138 (H) 09/15/2021 11:45 AM    BUN 11 09/15/2021 11:45 AM    Creatinine 0.63 09/15/2021 11:45 AM    BUN/Creatinine ratio 17 09/15/2021 11:45 AM    GFR est AA >60 09/15/2021 11:45 AM    GFR est non-AA >60 09/15/2021 11:45 AM    Calcium 9.1 09/15/2021 11:45 AM    Bilirubin, total 0.5 09/15/2021 11:45 AM    Alk. phosphatase 72 09/15/2021 11:45 AM    Protein, total 7.2 09/15/2021 11:45 AM    Albumin 3.8 09/15/2021 11:45 AM    Globulin 3.4 09/15/2021 11:45 AM    A-G Ratio 1.1 09/15/2021 11:45 AM    ALT (SGPT) 28 09/15/2021 11:45 AM     No results found for: CPK, CPKX, CPX  Lab Results   Component Value Date/Time    Cholesterol, total 159 09/15/2021 11:45 AM    HDL Cholesterol 79 09/15/2021 11:45 AM    LDL, calculated 68.4 09/15/2021 11:45 AM    Triglyceride 58 09/15/2021 11:45 AM    CHOL/HDL Ratio 2.0 09/15/2021 11:45 AM     No results found for this or any previous visit.     Assessment:         Patient Active Problem List    Diagnosis Date Noted    Hypothyroidism due to Hashimoto's thyroiditis 09/01/2021    Palpitations 09/01/2021    Chronic right-sided low back pain with bilateral sciatica 09/01/2021    History of stress fracture 09/01/2021    Type 1 diabetes mellitus with diabetic neuropathy ([facility] [address] 10/18/2018    MVP (mitral valve prolapse) 04/22/2015    Essential hypertension 04/22/2015      Hx IDDM with neuropathy, hypertension, dyslipidemia, thyroid disease, palpitations, MV prolapse, low back pain w/ sciatica, DJD hips followed by Evelio Amador NP with recent OV/labs. Moved here from [place], previously followed by PCP. Endocrine and Cardiology there. Recent foot pain/issues, prior stress fx and had Xrays which showed vascular calcifications. GIven rx for lovastatin (not taking), recent lipids as noted at target. Normal CBC, CMP, TSH. HbA1c 7.4. Occasional palpitations, had syncopal episode 2 weeks after receiving [employer]. Nausea/diaphoresis, weak/dizzy. Had Echo approx 2 yrs ago--reportedly ok.       Plan:     Echo discussed  Carotid dopplers  Holter monitor x 48 hrs  MYLA's  Fu with PCP as planned  Endocrine established    Cadence Craven MD

## 2021-09-20 NOTE — PROGRESS NOTES
Identified pt with two pt identifiers(name and ). Reviewed record in preparation for visit and have obtained necessary documentation. Chief Complaint   Patient presents with    New Patient     Referred by Arcadio Molina NP    Syncope     pt reports that she \"passed out 2 days after the pfizer vaccine (1st dose). \" \"A wave came over me that's never happened before. I could barely talk. Had to crawl to the phone to call 911. \"    Hypertension    Valvular Heart Disease      Vitals:    21 1326 21 1341   BP: (!) 150/62 (!) 150/76   Pulse: 80    Resp: 18    Temp: 97.3 °F (36.3 °C)    TempSrc: Temporal    SpO2: 100%    Weight: 148 lb (67.1 kg)    Height: 5' 6\" (1.676 m)    PainSc:   0 - No pain        Medications reviewed/approved by Dr. Matt Grijalva. Health Maintenance Review: Patient reminded of \"due or due soon\" health maintenance. I have asked the patient to contact his/her primary care provider (PCP) for follow-up on his/her health maintenance. Coordination of Care Questionnaire:  :   1) Have you been to an emergency room, urgent care, or hospitalized since your last visit? If yes, where when, and reason for visit? no       2. Have seen or consulted any other health care provider since your last visit? If yes, where when, and reason for visit? NO      Patient is accompanied by self I have received verbal consent from Bianka Kahn to discuss any/all medical information while they are present in the room.

## 2021-09-21 ENCOUNTER — TELEPHONE (OUTPATIENT)
Dept: FAMILY MEDICINE CLINIC | Age: 61
End: 2021-09-21

## 2021-09-21 NOTE — TELEPHONE ENCOUNTER
Ivonne from [facility] called and stated she needs a authorization from [employer] in order to see pt. NP# [id number] [address] [place] [address] 20, [address] [place] [address].      P# [phone]  F# [phone]

## 2021-09-24 ENCOUNTER — HOSPITAL ENCOUNTER (OUTPATIENT)
Dept: ULTRASOUND IMAGING | Age: 61
Discharge: HOME | End: 2021-09-24
Attending: INTERNAL MEDICINE
Payer: COMMERCIAL

## 2021-09-24 DIAGNOSIS — E78.5 DYSLIPIDEMIA: ICD-10-CM

## 2021-09-24 DIAGNOSIS — I73.9 PAD (PERIPHERAL ARTERY DISEASE) (HCC): ICD-10-CM

## 2021-09-24 DIAGNOSIS — R55 SYNCOPE AND COLLAPSE: ICD-10-CM

## 2021-09-24 DIAGNOSIS — I10 ESSENTIAL HYPERTENSION: ICD-10-CM

## 2021-09-24 DIAGNOSIS — M79.604 PAIN IN BOTH LOWER EXTREMITIES: ICD-10-CM

## 2021-09-24 DIAGNOSIS — E10.40 TYPE 1 DIABETES MELLITUS WITH DIABETIC NEUROPATHY (HCC): ICD-10-CM

## 2021-09-24 DIAGNOSIS — M79.605 PAIN IN BOTH LOWER EXTREMITIES: ICD-10-CM

## 2021-09-24 LAB
LEFT ABI: 1.17
LEFT ANTERIOR TIBIAL: 216 MMHG
LEFT ARM BP: 184 MMHG
LEFT POSTERIOR TIBIAL: 205 MMHG
RIGHT ABI: 1.24
RIGHT ANTERIOR TIBIAL: 216 MMHG
RIGHT ARM BP: 185 MMHG
RIGHT POSTERIOR TIBIAL: 230 MMHG

## 2021-09-24 PROCEDURE — 93922 UPR/L XTREMITY ART 2 LEVELS: CPT

## 2021-09-24 PROCEDURE — 93880 EXTRACRANIAL BILAT STUDY: CPT

## 2021-09-26 LAB
LEFT CCA DIST DIAS: 7.1 CENTIMETER/SECOND
LEFT CCA DIST SYS: 42.8 CENTIMETER/SECOND
LEFT CCA PROX DIAS: 6.9 CENTIMETER/SECOND
LEFT CCA PROX SYS: 47.5 CENTIMETER/SECOND
LEFT ECA DIAS: 5.52 CENTIMETER/SECOND
LEFT ECA SYS: 66 CENTIMETER/SECOND
LEFT ICA DIST DIAS: 19 CM/S
LEFT ICA DIST SYS: 90 CM/S
LEFT ICA MID DIAS: 14.9 CENTIMETER/SECOND
LEFT ICA MID SYS: 65.1 CENTIMETER/SECOND
LEFT ICA PROX DIAS: 8 CENTIMETER/SECOND
LEFT ICA PROX SYS: 45.6 CENTIMETER/SECOND
LEFT ICA/CCA SYS: 1.37
LEFT VERTEBRAL DIAS: 8.86 CENTIMETER/SECOND
LEFT VERTEBRAL SYS: 36 CENTIMETER/SECOND
RIGHT CCA DIST DIAS: 7 CENTIMETER/SECOND
RIGHT CCA DIST SYS: 43.1 CENTIMETER/SECOND
RIGHT CCA PROX DIAS: 10 CM/S
RIGHT CCA PROX SYS: 54 CM/S
RIGHT ECA DIAS: 3.3 CENTIMETER/SECOND
RIGHT ECA SYS: 46.8 CENTIMETER/SECOND
RIGHT ICA DIST DIAS: 25.2 CENTIMETER/SECOND
RIGHT ICA DIST SYS: 92.1 CENTIMETER/SECOND
RIGHT ICA MID DIAS: 19 CENTIMETER/SECOND
RIGHT ICA MID SYS: 50.2 CENTIMETER/SECOND
RIGHT ICA PROX DIAS: 8.9 CENTIMETER/SECOND
RIGHT ICA PROX SYS: 48.6 CENTIMETER/SECOND
RIGHT ICA/CCA SYS: 1.7
RIGHT VERTEBRAL DIAS: 7.93 CENTIMETER/SECOND
RIGHT VERTEBRAL SYS: 45.6 CENTIMETER/SECOND

## 2021-09-28 NOTE — TELEPHONE ENCOUNTER
I have spoke with several reps at [facility] and was told no pre-auth was needed, just the referral from us. Spoke with Hurley Dandy and she stated that wasn't true. Called [facility] back and was told to go online an print off a referral form and this was needed for referrals. Reference# [id number]. In the same time Naresh faxed over the form and I had provider sign and faxed back.

## 2021-10-15 ENCOUNTER — TELEPHONE (OUTPATIENT)
Dept: CARDIOLOGY CLINIC | Age: 61
End: 2021-10-15

## 2021-10-15 NOTE — TELEPHONE ENCOUNTER
Pt called to make a fu appt she has an upcoming stress test but has not put on her HM yet. She has but has not done. She is having some issues she passed out yesterday. She wanted to get in in this month but told her limited availability.   Please call to discuss [phone]

## 2021-10-15 NOTE — TELEPHONE ENCOUNTER
11/4/21 10:30 AM  - 30 min [facility] STRESS ROOM 1     48 hr HM ordered 9/20 by Dr. Celine Hogue for syncope. Pt has but has yet to apply. Per MD:   MD Pineda Gutierrez; Oswaldo Sanford LPN  Caller: Unspecified (Today,  1:04 PM)  Should have monitor and stress test done as planned. S/w pt. Verified patient with two patient identifiers. Pt had a syncopal episode yesterday & hit her head. EMS was called and pt was evaluated but not transferred to ER.  Yesterday AM (had banana prior). BS 60 after Syncopal episode. /70  Checked by EMT per pt. (reports DBP is sometimes in 60's and that's unusual or her). Advised to apply monitor asap. Advised that her ST may be moved up if she wishes to go to [facility] vs [facility]. Advised to call endocrinology or pcp regarding BS. Pt notes that she takes verapamil + lisinopril in the AM. Per MD: take verapamil at night. Pt verbalized understanding.

## 2021-10-18 ENCOUNTER — PATIENT MESSAGE (OUTPATIENT)
Dept: CARDIOLOGY CLINIC | Age: 61
End: 2021-10-18

## 2021-10-18 NOTE — TELEPHONE ENCOUNTER
Already replied to the pt via [employer]. Advised seeking help through pcp regarding anxiety.   Her 1st  [employer] message was read out aloud to MD.  2n was forwarded to MD.

## 2021-10-18 NOTE — TELEPHONE ENCOUNTER
Pt called in again this morning about the same issue. Her BP is in now staying down, but she has changed her own meds and she wants to discuss this. She has also done some research and want to discuss this with you. Really would like to talk with the Dr but she will settle for the nurse.   Please  Call [phone]

## 2021-10-18 NOTE — TELEPHONE ENCOUNTER
Pt also sent a [employer] message. (see below)    Replied back through [employer] with MD response. Marco Horn  to Elver Feliciano MD      10/18/21 10:20 AM  Dr. Patti Rod,          Something bizarre is going on with my BP. We discussed my experience on 9/11/21 of passing out (48 hours after my first COVID shot and my blood pressure being so low for a few days afterward that I changed my medication routine. After resuming my previous BP med routine (both taken in am) I am having the same problem of low BP, especially diastolic. I'm not feeling right! I passed out again on 10/14/21. My anxiety level is through the roof! The nurse advised me on 10/15/21 to keep taking the same meds but switch to taking verapamil in the evening. Of course, I forgot the first night  but the next AM & thru the day my BP was fine. I've been taking lisinopril but not Verapamil for 3 days now. See next message. My reply to the pt as advised by MD:  Avril Rod and I reviewed your call intake note and your [employer] message.     Per Dr. Patti Rod:   Start the monitor as advised/ordered. Proceed with stress test as ordered. Your decision on the verapamil. If you feel better without the verapamil then that is fine but please monitor the blood pressure and heart rate per blood pressure machine.     Talk to primary care provider regarding anxiety.     Thank you,  Terry Barthel, MATEO\"

## 2021-10-19 ENCOUNTER — TELEPHONE (OUTPATIENT)
Dept: CARDIOLOGY CLINIC | Age: 61
End: 2021-10-19

## 2021-10-19 NOTE — TELEPHONE ENCOUNTER
MD Andreea Miner LPN  Caller: Unspecified (Today,  9:27 AM)  Orestesla Gina to hold verapamil completely, increase lisinopril to 10mg bid (was on 15 every day), continue to monitor BP and HR at home, complete testing (holter, stress) as planned. [facility]     Order verified and communicated to the pt via [employer].

## 2021-10-19 NOTE — TELEPHONE ENCOUNTER
Pt sent the following [employer] message 12 hrs ago: Thank You. No need to consult my primary care for anxiety. You have relieved most of my anxiety by confirming that it is okay to discontinue verapamil if I feel better without it and I do. I am not used to going against doctor's advice but I HAD to this time. I am closely monitoring my BP's with home machine and I think I need to take just a little more lisinopril and have done research on taking it twice a day as opposed to once per day. It seems to be more effective in reducing systolic pressure that way. Could use some advice on the total dosage. I'm trying to wear the heart monitor for as long as possible up to the two weeks recommended in instructions. I certainly will be keeping my appointment for the stress test.    Replied to pt and asked for BP readings. Pt sent the following message:  Fri 10/15            15 mg Lisinopril and 180 mg Verapamil Sr in AM   awaken 8am  109/56 pulse (97)   10 a  137/69 (71)  noon  146/78 (74)  3p  182/75 (65)  7p  142/63 (73)  Felt strange all day, anxious. Passed out day BEFORE. Passed out while talking to my grandson on facebook. Leaned over to get something, next thing I knew I woke up on the floor.   I had no warning, (blood sugar was 130 at the time) when EMT's arrived BP was 137/60 something     Saturday 10/16  8a  99/58 (103) felt like death warmed over, decided NOT to take BP meds  1p  133/74 (74) took 15 mg lisinopril thinking I needed SOMETHING  2:45p   125/64 (85)  felt light headed checked again at 3pm 110/63 (71)  5p 136/68 (75)  6p  140/76  (77)  11p  145/67 (88)

## 2021-10-27 PROCEDURE — 93225 XTRNL ECG REC<48 HRS REC: CPT | Performed by: INTERNAL MEDICINE

## 2021-10-27 PROCEDURE — 93227 XTRNL ECG REC<48 HR R&I: CPT | Performed by: INTERNAL MEDICINE

## 2021-11-02 ENCOUNTER — TELEPHONE (OUTPATIENT)
Dept: NON INVASIVE DIAGNOSTICS | Age: 61
End: 2021-11-02

## 2021-11-04 ENCOUNTER — HOSPITAL ENCOUNTER (OUTPATIENT)
Dept: NON INVASIVE DIAGNOSTICS | Age: 61
Discharge: HOME | End: 2021-11-04
Attending: INTERNAL MEDICINE
Payer: COMMERCIAL

## 2021-11-04 DIAGNOSIS — E10.40 TYPE 1 DIABETES MELLITUS WITH DIABETIC NEUROPATHY (HCC): ICD-10-CM

## 2021-11-04 DIAGNOSIS — E78.5 DYSLIPIDEMIA: ICD-10-CM

## 2021-11-04 DIAGNOSIS — I10 ESSENTIAL HYPERTENSION: ICD-10-CM

## 2021-11-04 DIAGNOSIS — R55 SYNCOPE AND COLLAPSE: ICD-10-CM

## 2021-11-04 PROCEDURE — 93017 CV STRESS TEST TRACING ONLY: CPT

## 2021-11-05 ENCOUNTER — PATIENT MESSAGE (OUTPATIENT)
Dept: CARDIOLOGY CLINIC | Age: 61
End: 2021-11-05

## 2021-11-07 LAB
STRESS BASELINE DIAS BP: 63 MMHG
STRESS BASELINE HR: 73 BPM
STRESS BASELINE SYS BP: 163 MMHG
STRESS ESTIMATED WORKLOAD: 10.1 METS
STRESS EXERCISE DUR MIN: NORMAL MIN:SEC
STRESS PEAK DIAS BP: 81 MMHG
STRESS PEAK SYS BP: 247 MMHG
STRESS PERCENT HR ACHIEVED: 97 %
STRESS POST PEAK HR: 153 BPM
STRESS RATE PRESSURE PRODUCT: NORMAL BPM*MMHG
STRESS TARGET HR: 157 BPM

## 2021-11-09 NOTE — TELEPHONE ENCOUNTER
----- Message from Amy Smith NP sent at 11/8/2021  9:50 PM EST -----  Please call patient routine exercise stress test is normal.

## 2021-12-02 RX ORDER — LISINOPRIL 5 MG/1
TABLET ORAL
Qty: 90 TABLET | Refills: 1 | Status: SHIPPED | OUTPATIENT
Start: 2021-12-02 | End: 2022-03-13 | Stop reason: ALTCHOICE

## 2021-12-03 ENCOUNTER — TELEPHONE (OUTPATIENT)
Dept: FAMILY MEDICINE CLINIC | Age: 61
End: 2021-12-03

## 2021-12-03 ENCOUNTER — NURSE TRIAGE (OUTPATIENT)
Dept: OTHER | Facility: CLINIC | Age: 61
End: 2021-12-03

## 2021-12-03 NOTE — TELEPHONE ENCOUNTER
----- Message from Tineo Dagoberto sent at 12/3/2021 11:39 AM EST -----  Subject: Appointment Request    Reason for Call: Urgent Return from RN Triage    QUESTIONS  Type of Appointment? Established Patient  Reason for appointment request? Available appointments did not meet   patient need  Additional Information for Provider? Patient was transferred from [place] with   disposition to be seen today or tomorrow she would like to also have a   blood test to rule out Lyme disease please try both numbers   ---------------------------------------------------------------------------  --------------  CALL BACK INFO  What is the best way for the office to contact you? OK to leave message on   voicemail  Preferred Call Back Phone Number? [phone]  ---------------------------------------------------------------------------  --------------  SCRIPT ANSWERS  Patient needs to be seen today or tomorrow? Yes   Nurse Name? Debra  Have you been diagnosed with, awaiting test results for, or told that you   are suspected of having COVID-19 (Coronavirus)? (If patient has tested   negative or was tested as a requirement for work, school, or travel and   not based on symptoms, answer no)? No  Within the past two weeks have you developed any of the following symptoms   (answer no if symptoms have been present longer than 2 weeks or began   more than 2 weeks ago)? Fever or Chills, Cough, Shortness of breath or   difficulty breathing, Loss of taste or smell, Sore throat, Nasal   congestion, Sneezing or runny nose, Fatigue or generalized body aches   (answer no if pain is specific to a body part e.g. back pain), Diarrhea,   Headache? No  Have you had close contact with someone with COVID-19 in the last 14 days? No  (Service Expert  click yes below to proceed with [employer]   Scheduling)?  Yes

## 2021-12-03 NOTE — TELEPHONE ENCOUNTER
Wants to rule out lime disease due to finding a deer tick and having a swollen painful knee. Reason for Disposition   [1] Very swollen joint AND [2] no fever   Can't move joint normally (bend and straighten completely)    Answer Assessment - Initial Assessment Questions  1. ONSET: \"When did the swelling start? \" (e.g., minutes, hours, days)      3 days     2. LOCATION: \"What part of the leg is swollen? \"  \"Are both legs swollen or just one leg? \"      Both knees initially, now left knee is in pain. 3. SEVERITY: \"How bad is the swelling? \" (e.g., localized; mild, moderate, severe)   - Localized - small area of swelling localized to one leg   - MILD pedal edema - swelling limited to foot and ankle, pitting edema < 1/4 inch (6 mm) deep, rest and elevation eliminate most or all swelling   - MODERATE edema - swelling of lower leg to knee, pitting edema > 1/4 inch (6 mm) deep, rest and elevation only partially reduce swelling   - SEVERE edema - swelling extends above knee, facial or hand swelling present       Mild, only knee swollen     4. REDNESS: \"Does the swelling look red or infected? \"      No     5. PAIN: \"Is the swelling painful to touch? \" If so, ask: \"How painful is it? \"   (Scale 1-10; mild, moderate or severe)      6-7/10    6. FEVER: \"Do you have a fever? \" If so, ask: \"What is it, how was it measured, and when did it start? \"       *No Answer*    7. CAUSE: \"What do you think is causing the leg swelling? \"      Lime disease? 1 month ago found a deer tick. Never got the rash. 8. MEDICAL HISTORY: \"Do you have a history of heart failure, kidney disease, liver failure, or cancer? \"      *No Answer*    9. RECURRENT SYMPTOM: \"Have you had leg swelling before? \" If so, ask: \"When was the last time? \" \"What happened that time? \"      *No Answer*    10. OTHER SYMPTOMS: \"Do you have any other symptoms? \" (e.g., chest pain, difficulty breathing)        *No Answer*    11. PREGNANCY: \"Is there any chance you are pregnant? \" \"When was your last menstrual period? \"        N/a    Answer Assessment - Initial Assessment Questions  1. LOCATION: \"Where is the swelling located? \"  (e.g., left, right, both knees)      Left kknee    2. SIZE and DESCRIPTION: \"What does the swelling look like? \"  (e.g., entire knee, localized)      Entire knee     3. ONSET: \"When did the swelling start? \" \"Does it come and go, or is it there all the time? \"      3 days ago     4. PAIN: \"Is there any pain? \" If so, ask: \"How bad is it? \" (Scale 1-10; or mild, moderate, severe)      6-7/10    5. SETTING: \"Has there been any recent work, exercise or other activity that involved that part of the body? \"       She does been doing PT. 6. AGGRAVATING FACTORS: \"What makes the knee swelling worse? \" (e.g., walking, climbing stairs, running)      Walking, having trouble walking. 7. ASSOCIATED SYMPTOMS: \"Is there any pain or redness? \"      Pain no redness. 8. OTHER SYMPTOMS: \"Do you have any other symptoms? \" (e.g., chest pain, difficulty breathing, fever, calf pain)      No     9. PREGNANCY: \"Is there any chance you are pregnant? \" \"When was your last menstrual period? \"      N/a    Protocols used: KNEE SWELLING-ADULT-AH, LEG SWELLING AND EDEMA-ADULT-OH    Received call from Harry Barraza at [facility] with Red Flag Complaint. Brief description of triage: left knee pain and swelling, concerned for Lime disease and wants blood work to rule it out. Triage indicates for patient to be seen within the next 24 hours. Recommended UCC/ER if unable to get in. Care advice provided, patient verbalizes understanding; denies any other questions or concerns; instructed to call back for any new or worsening symptoms. Writer provided warm transfer to Melida Loza at [facility] for appointment scheduling. Attention Provider: Thank you for allowing me to participate in the care of your patient. The patient was connected to triage in response to information provided to the [facility]. Please do not respond through this encounter as the response is not directed to a shared pool.

## 2021-12-09 ENCOUNTER — HOSPITAL ENCOUNTER (OUTPATIENT)
Dept: LAB | Age: 61
Discharge: HOME | End: 2021-12-09
Payer: COMMERCIAL

## 2021-12-09 ENCOUNTER — OFFICE VISIT (OUTPATIENT)
Dept: FAMILY MEDICINE CLINIC | Age: 61
End: 2021-12-09
Payer: COMMERCIAL

## 2021-12-09 ENCOUNTER — HOSPITAL ENCOUNTER (OUTPATIENT)
Dept: GENERAL RADIOLOGY | Age: 61
Discharge: HOME | End: 2021-12-09
Payer: COMMERCIAL

## 2021-12-09 VITALS
SYSTOLIC BLOOD PRESSURE: 150 MMHG | BODY MASS INDEX: 24.43 KG/M2 | WEIGHT: 152 LBS | HEART RATE: 67 BPM | RESPIRATION RATE: 18 BRPM | TEMPERATURE: 97.8 F | OXYGEN SATURATION: 99 % | DIASTOLIC BLOOD PRESSURE: 75 MMHG | HEIGHT: 66 IN

## 2021-12-09 DIAGNOSIS — M25.562 LEFT KNEE PAIN, UNSPECIFIED CHRONICITY: Primary | ICD-10-CM

## 2021-12-09 DIAGNOSIS — S30.861A TICK BITE OF ABDOMEN, INITIAL ENCOUNTER: ICD-10-CM
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
## 2021-12-09 DIAGNOSIS — M25.562 LEFT KNEE PAIN, UNSPECIFIED CHRONICITY: ICD-10-CM

## 2021-12-09 PROCEDURE — 73560 X-RAY EXAM OF KNEE 1 OR 2: CPT

## 2021-12-09 PROCEDURE — 99213 OFFICE O/P EST LOW 20 MIN: CPT | Performed by: NURSE PRACTITIONER
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
## 2021-12-09 NOTE — PROGRESS NOTES
1. Have you been to the ER, urgent care clinic since your last visit? Hospitalized since your last visit? No    2. Have you seen or consulted any other health care providers outside of the [address] [place] [address] since your last visit? Include any pap smears or colon screening.  Dr. Monse Drew Dr.     Chief Complaint   Patient presents with    Knee Pain     left     Visit Vitals  BP (!) 163/78 (BP 1 Location: Left arm, BP Patient Position: Sitting)   Pulse 67   Temp 97.8 °F (36.6 °C) (Temporal)   Resp 18   Ht 5' 6\" (1.676 m)   Wt 152 lb (68.9 kg)   SpO2 99%   BMI 24.53 kg/m²

## 2021-12-10 ENCOUNTER — TELEPHONE (OUTPATIENT)
Dept: FAMILY MEDICINE CLINIC | Age: 61
End: 2021-12-10

## 2021-12-10 DIAGNOSIS — M25.562 ACUTE PAIN OF LEFT KNEE: Primary | ICD-10-CM

## 2021-12-10 NOTE — PROGRESS NOTES
Spoke with patient over the phone. She would like to do physical therapy.  Referral placed for physical therapy

## 2021-12-13 ENCOUNTER — DOCUMENTATION ONLY (OUTPATIENT)
Dept: FAMILY MEDICINE CLINIC | Age: 61
End: 2021-12-13

## 2021-12-13 LAB — B BURGDOR IGM SER IA-ACNC: <0.8 INDEX (ref 0–0.79)

## 2022-01-07 ENCOUNTER — TELEPHONE (OUTPATIENT)
Dept: FAMILY MEDICINE CLINIC | Age: 62
End: 2022-01-07

## 2022-01-07 NOTE — TELEPHONE ENCOUNTER
----- Message from Xiang Woods sent at 1/7/2022  3:54 PM EST -----  Subject: Message to Provider    QUESTIONS  Information for Provider? Pt is asking if [facility] was able to take care   of the insurance referral for PT at [facility].   ---------------------------------------------------------------------------  --------------  [address]  What is the best way for the office to contact you? OK to leave message on   voicemail  Preferred Call Back Phone Number? [phone]  ---------------------------------------------------------------------------  --------------  SCRIPT ANSWERS  Relationship to Patient?  Self

## 2022-01-20 ENCOUNTER — DOCUMENTATION ONLY (OUTPATIENT)
Dept: FAMILY MEDICINE CLINIC | Age: 62
End: 2022-01-20

## 2022-01-27 ENCOUNTER — TRANSCRIBE ORDER (OUTPATIENT)
Dept: SCHEDULING | Age: 62
End: 2022-01-27

## 2022-01-27 DIAGNOSIS — S83.241A ACUTE MEDIAL MENISCUS TEAR OF RIGHT KNEE: ICD-10-CM

## 2022-01-27 DIAGNOSIS — M25.562 LEFT KNEE PAIN: Primary | ICD-10-CM

## 2022-02-08 ENCOUNTER — HOSPITAL ENCOUNTER (OUTPATIENT)
Dept: MRI IMAGING | Age: 62
Discharge: HOME | End: 2022-02-08
Attending: ORTHOPAEDIC SURGERY
Payer: COMMERCIAL

## 2022-02-08 DIAGNOSIS — S83.241A ACUTE MEDIAL MENISCUS TEAR OF RIGHT KNEE: ICD-10-CM

## 2022-02-08 DIAGNOSIS — M25.562 LEFT KNEE PAIN: ICD-10-CM

## 2022-02-08 PROCEDURE — 73721 MRI JNT OF LWR EXTRE W/O DYE: CPT

## 2022-02-09 ENCOUNTER — DOCUMENTATION ONLY (OUTPATIENT)
Dept: FAMILY MEDICINE CLINIC | Age: 62
End: 2022-02-09

## 2022-02-09 ENCOUNTER — TELEPHONE (OUTPATIENT)
Dept: FAMILY MEDICINE CLINIC | Age: 62
End: 2022-02-09

## 2022-02-09 DIAGNOSIS — S83.207A TEAR OF MENISCUS OF LEFT KNEE, UNSPECIFIED MENISCUS, UNSPECIFIED TEAR TYPE, UNSPECIFIED WHETHER OLD OR CURRENT TEAR: Primary | ICD-10-CM

## 2022-02-09 NOTE — TELEPHONE ENCOUNTER
----- Message from April Lito sent at 2/9/2022 11:10 AM EST -----  Subject: Referral Request    QUESTIONS   Reason for referral request? Patient states per her insurance she needs   another referral for her to see Dr. Lizzie Mccormack as the initial referral was   just for one visit. Patient states if another referral isn't put in she   cannot get her follow up visit covered. Has the physician seen you for this condition before? Yes  Select a date? 2021-12-09  Select the Provider the patient wants to be referred to, if known (PCP or   Specialist)? Outside Physician - Jennifer Pedersen   Preferred Specialist (if applicable)? Do you already have an appointment scheduled? No  Additional Information for Provider? please call patient back when new   referral is placed, thank you. patient would like a reference number as   well, so she can call her insurance and give them that   ---------------------------------------------------------------------------  --------------  [address]  What is the best way for the office to contact you? OK to leave message on   voicemail  Preferred Call Back Phone Number?  [phone]

## 2022-02-10 ENCOUNTER — DOCUMENTATION ONLY (OUTPATIENT)
Dept: FAMILY MEDICINE CLINIC | Age: 62
End: 2022-02-10

## 2022-02-10 NOTE — TELEPHONE ENCOUNTER
Laverne Bailey has already started to work on this. The patient has called and Dr Alka Byers office has called.

## 2022-02-11 NOTE — TELEPHONE ENCOUNTER
Pt was made aware that I've faxed everything needed for [address] office.  Pt was going to call [facility] again & get them to do a three way call to get things moving

## 2022-02-22 ENCOUNTER — DOCUMENTATION ONLY (OUTPATIENT)
Dept: FAMILY MEDICINE CLINIC | Age: 62
End: 2022-02-22

## 2022-02-22 NOTE — PROGRESS NOTES
Per pt. I faxed both Referral Requisitions for Dr. Inocencia Macedo to [phone] Attn:  Ken Garcia at [facility].

## 2022-02-25 ENCOUNTER — TELEPHONE (OUTPATIENT)
Dept: FAMILY MEDICINE CLINIC | Age: 62
End: 2022-02-25

## 2022-02-25 NOTE — TELEPHONE ENCOUNTER
----- Message from Rosita Patt sent at 2/25/2022  1:02 PM EST -----  Subject: Referral Request    QUESTIONS   Reason for referral request? PT requesting referral for a second opinion   on her left knee torn meniscus. Has the physician seen you for this condition before? Yes  Select a date? 2021-12-09  Select the Provider the patient wants to be referred to, if known (PCP or   Specialist)? Outside Physician - [facility]   Preferred Specialist (if applicable)? Do you already have an appointment scheduled? Yes  Select Scheduled Date? 2022-03-14  Select Scheduled Physician? Outside Physician - Ortho   Additional Information for Provider?   ---------------------------------------------------------------------------  --------------  [address]  What is the best way for the office to contact you? OK to leave message on   voicemail  Preferred Call Back Phone Number?  [phone]

## 2022-02-28 DIAGNOSIS — G89.29 CHRONIC PAIN OF LEFT KNEE: ICD-10-CM

## 2022-02-28 DIAGNOSIS — M25.562 LEFT KNEE PAIN, UNSPECIFIED CHRONICITY: Primary | ICD-10-CM

## 2022-02-28 DIAGNOSIS — M25.562 CHRONIC PAIN OF LEFT KNEE: ICD-10-CM

## 2022-03-01 ENCOUNTER — DOCUMENTATION ONLY (OUTPATIENT)
Dept: FAMILY MEDICINE CLINIC | Age: 62
End: 2022-03-01

## 2022-03-11 ENCOUNTER — OFFICE VISIT (OUTPATIENT)
Dept: FAMILY MEDICINE CLINIC | Age: 62
End: 2022-03-11
Payer: COMMERCIAL

## 2022-03-11 VITALS
BODY MASS INDEX: 24.49 KG/M2 | TEMPERATURE: 98 F | HEART RATE: 73 BPM | OXYGEN SATURATION: 99 % | SYSTOLIC BLOOD PRESSURE: 123 MMHG | WEIGHT: 152.38 LBS | RESPIRATION RATE: 18 BRPM | HEIGHT: 66 IN | DIASTOLIC BLOOD PRESSURE: 66 MMHG

## 2022-03-11 DIAGNOSIS — M54.41 CHRONIC RIGHT-SIDED LOW BACK PAIN WITH BILATERAL SCIATICA: ICD-10-CM

## 2022-03-11 DIAGNOSIS — I10 ESSENTIAL HYPERTENSION: ICD-10-CM

## 2022-03-11 DIAGNOSIS — E03.8 HYPOTHYROIDISM DUE TO HASHIMOTO'S THYROIDITIS: Primary | ICD-10-CM

## 2022-03-11 DIAGNOSIS — E06.3 HYPOTHYROIDISM DUE TO HASHIMOTO'S THYROIDITIS: Primary | ICD-10-CM

## 2022-03-11 DIAGNOSIS — M25.562 LEFT KNEE PAIN, UNSPECIFIED CHRONICITY: ICD-10-CM

## 2022-03-11 DIAGNOSIS — M54.42 CHRONIC RIGHT-SIDED LOW BACK PAIN WITH BILATERAL SCIATICA: ICD-10-CM

## 2022-03-11 DIAGNOSIS — E10.40 TYPE 1 DIABETES MELLITUS WITH DIABETIC NEUROPATHY (HCC): ICD-10-CM

## 2022-03-11 DIAGNOSIS — G89.29 CHRONIC RIGHT-SIDED LOW BACK PAIN WITH BILATERAL SCIATICA: ICD-10-CM

## 2022-03-11 PROCEDURE — 36415 COLL VENOUS BLD VENIPUNCTURE: CPT | Performed by: NURSE PRACTITIONER

## 2022-03-11 PROCEDURE — 99214 OFFICE O/P EST MOD 30 MIN: CPT | Performed by: NURSE PRACTITIONER

## 2022-03-11 PROCEDURE — 3051F HG A1C>EQUAL 7.0%<8.0%: CPT | Performed by: NURSE PRACTITIONER

## 2022-03-11 NOTE — PROGRESS NOTES
1. \"Have you been to the ER, urgent care clinic since your last visit? Hospitalized since your last visit? \" No    2. \"Have you seen or consulted any other health care providers outside of the [address] [place] [address] since your last visit? \" No     Chief Complaint   Patient presents with    Diabetes    Thyroid Problem    Knee Pain     left knee     Visit Vitals  /66 (BP 1 Location: Left arm, BP Patient Position: Sitting)   Pulse 73   Temp 98 °F (36.7 °C) (Temporal)   Resp 18   Ht 5' 6\" (1.676 m)   Wt 152 lb 6 oz (69.1 kg)   SpO2 99%   BMI 24.59 kg/m²     Labs drawn in left arm per Mika Cesar NP's orders. Patient tolerated well.

## 2022-03-11 NOTE — PROGRESS NOTES
Subjective:     CC: hypothyroidism, diabetes    Wanda Harman is a 57 y.o. female who presents today for a 6 month follow up for hypothyroidism and diabetes. She also has left knee pain    She and her  moved down here full time last year into their second home in [place]. They used to live in [place]. It was a big adjustment and very stressful. She has OA in both hips and is c/o left knee pain today. Saw ortho Dr Roslyn Roblero who told her she needs a \"clean out\" according to her MRI. She is worried that if she has the surgery at [facility], the hospital \"may not be capable of managing her as a type 1 diabetic with neuropathy. \"  She is going to see ortho Dr Fernando Avila who operates out of a different hospital next month for a second opinion     States all of her xrays show \"calcium deposits. \" She has been googling and read that hyperparathyroidism can cause elevated calcium levels and wants her PTH checked. She has also been doing a lot of research on how to decrease inflammation. She read about turmeric and bought some. Has been taking it for 3 days now. Chronic right lower back pain with bilateral sciatica  She has chronic low back pain with bilateral sciatica, worse in the right leg and foot. Pain started back in February of 2021 after she went sledding. Denied any fall or injury. Back in [place] she saw a spine specialist who ordered an MRI. She was told she has multiple bulging discs at L3-5. She was offered a steroid injection but declined due to her diabetes. She was advised to do PT but never did. She was also advised to take aleve prn pain. Pt states she does not take the Aleve often because it causes her BP to go up and it \"destroys her stomach\" if she takes it too frequently. She was prescribed Meloxicam but never took it. Afraid of s/e. HTN with white coat syndrome and hx of palpitations   BP today is at goal. She is on Lisinopril 10mg daily.  She used to take 15mg daily but after the Covid vaccine her BP dropped so she stopped taking the 5mg pill. Could not tolerate Losartan. Follows a low sodium diet. She has no HLD. Lab Results   Component Value Date/Time    Cholesterol, total 159 09/15/2021 11:45 AM    HDL Cholesterol 79 09/15/2021 11:45 AM    LDL, calculated 68.4 09/15/2021 11:45 AM    VLDL, calculated 11.6 09/15/2021 11:45 AM    Triglyceride 58 09/15/2021 11:45 AM    CHOL/HDL Ratio 2.0 09/15/2021 11:45 AM            Type 1 diabetes with peripheral neuropathy  Lab Results   Component Value Date/Time    Hemoglobin A1c 7.4 (H) 09/15/2021 11:45 AM     She was diagnosed as a child. She is followed by ENDO in [place] who is soon retiring, she did find a new Endocrinologist but prior to the appt they wanted her to check her blood sugars at home TID and upload them into their computer system which she did not want to do. States she does not want to be \"micro-managed. \" Tries to follow a strict low carb, sugar free diet. She checks her BS 7-8 times daily. She is on 18 units of Lantus daily and injects up to 10 units of Lispro TID qAC according to sliding scale. She also read about alpha-lipoic acid and it is supposed to be good for her diabetic peripheral neuropathy (she has chronic tingling in hands and feet) so she has been taking it for the past 1 year. Considering joining the [facility] to use the pool since she cannot walk long distances on account of knee pain     LAST EYE EXAM was a few months ago but she cannot rememeber who she saw. Will look at home and have them fax us the records      Hashimoto's hypothyroidism   Lab Results   Component Value Date/Time    TSH 1.75 09/15/2021 11:45 AM     Followed by ENDO in [place] but trying to find a new one. She is currently taking Levothyroxine 137mcg daily 6 days per week. Healthcare maintenance  Covid vaccine- rec'd 1st Pfizer vaccine in 9-2021, states she was on the floor 2 days later calling 911 so did not rec the second dose. ( It made her weak and she recovered)    MAMMO- done 2-8-22 (normal)  PAP- done 7-23-21 (normal)  COLONOSCOPY- done 4/2019 (normal), will repeat in 10 years        Patient Active Problem List   Diagnosis Code    Essential hypertension I10    Type 1 diabetes mellitus with diabetic neuropathy ([facility] E10.40    Hypothyroidism due to Hashimoto's thyroiditis E03.8, E06.3    Palpitations R00.2    Chronic right-sided low back pain with bilateral sciatica M54.41, M54.42, G89.29    History of stress fracture Z87.312         Past Medical History:   Diagnosis Date    Diabetes ([facility]     H/O colonoscopy 04/13/2019    H/O mammogram 02/08/2021    Hypertension     Hypothyroidism due to Hashimoto's thyroiditis 1999         Current Outpatient Medications:     lisinopriL (PRINIVIL, ZESTRIL) 5 mg tablet, Take 1 pill daily with a 10mg daily for total daily dose 15mg, Disp: 90 Tablet, Rfl: 1    lisinopriL (PRINIVIL, ZESTRIL) 10 mg tablet, Take 1 pill daily with a 5mg daily for total daily dose 15mg (Patient taking differently: 10 mg two (2) times a day. Take 1 pill daily with a 5mg daily for total daily dose 15mg), Disp: 90 Tablet, Rfl: 1    insulin glargine (LANTUS,BASAGLAR) 100 unit/mL (3 mL) inpn, Inject 18 units SQ every morning, Disp: 15 mL, Rfl: 0    Insulin Needles, Disposable, 31 gauge x 5/16\" ndle, Inject daily with insulin up to 4 times daily, Disp: 100 Each, Rfl: 11    Blood-Glucose Meter monitoring kit, Check sugar up to 5 times daily, DX: E10.40, Disp: 1 Kit, Rfl: 0    glucose blood VI test strips (ASCENSIA AUTODISC VI, ONE TOUCH ULTRA TEST VI) strip, Check sugar up to 5 times daily, DX: E10.40, Disp: 200 Strip, Rfl: 5    lancets misc, Check sugar up to 5 times daily, DX: E10.40, Disp: 200 Each, Rfl: 11    levothyroxine (SYNTHROID) 137 mcg tablet, Take 1 pill every morning 6 days per week, Disp: 90 Tablet, Rfl: 0    cholecalciferol, vitamin D3, (Vitamin D3) 50 mcg (2,000 unit) tab, Take 1 Tablet by mouth daily. , Disp: 90 Tablet, Rfl: 1    insulin lispro (HUMALOG) 100 unit/mL kwikpen, Inject three times daily before meals according to sliding scale. Max dose 10 units, Disp: 5 Pen, Rfl: 0    Allergies   Allergen Reactions    Iodine Anaphylaxis    Sulfa (Sulfonamide Antibiotics) Angioedema       Past Surgical History:   Procedure Laterality Date    HX CARPAL TUNNEL RELEASE      HX  SECTION      HX COLONOSCOPY  2019    recommended 5 year followup colonoscopy       Social History     Tobacco Use   Smoking Status Never Smoker   Smokeless Tobacco Never Used       Social History     Socioeconomic History    Marital status:    Tobacco Use    Smoking status: Never Smoker    Smokeless tobacco: Never Used   Substance and Sexual Activity    Alcohol use:  Yes     Alcohol/week: 0.8 standard drinks     Types: 1 Glasses of wine per week    Drug use: Never    Sexual activity: Yes   Other Topics Concern     Service No    Blood Transfusions No    Caffeine Concern No    Occupational Exposure No    Hobby Hazards No    Sleep Concern No    Stress Concern No    Weight Concern No    Special Diet No    Back Care No    Exercise Yes    Bike Helmet No    Seat Belt Yes    Self-Exams Yes       Family History   Problem Relation Age of Onset    Asthma Father     Diabetes Father     Heart Disease Father     Hypertension Father     Hypertension Sister     Diabetes Son        ROS:  Gen: denies fever, chills, or fatigue  HEENT:denies H/A, nasal congestion, or sore throat  Resp: denies dyspnea, cough, or wheezing  CV: denies chest pain or pressure, +occas palpitations  Extremeties: +previous edema in left foot- now resolved after chiropractor visit, no pallor, or cyanosis  Musculoskeletal: +chronic right low back pain with bilateral sciatica and muscle cramps   Neuro: + numbness/tingling in BLE's , +occas dizziness  Skin: denies rashes or new lesions   Psych: denies anxiety, depression, kierra, or other changes in mood      Objective: There were no vitals taken for this visit. There is no height or weight on file to calculate BMI. General: Alert and oriented. +Wincing in pain when she moves in her chair. Well nourished. HEENT :  Eyes: Sclera white, conjunctiva clear. PERRLA. Extra ocular movements intact. Nose: Patent. Neck: Supple with FROM. Lungs: Breathing even and unlabored. All lobes clear to auscultation bilaterally   Heart :RRR, S1 and S2 normal intensity, no extra heart sounds  Extremities: Non-edematous, no pallor or cyanosis. Feet are warm  Back: + tenderness to palpation over the lumbar spine. No paraspinal tenderness. Limited ROM due to pain. Musculoskeletal: No joint heat, erythema, or swelling. FROM in all joints. Neuro: Cranial nerves grossly normal.  Sensory: Sensation slightly decreased to lower legs. Motor: Strength 5 over 5 and symmetrical in BLE's. No tremor. Psych: Mood and thought content appropriate for situation. Dressed appropriately and with good hygiene. Skin: Warm, dry, and intact. No lesions or discoloration.       Assessment/ Plan:     Chronic left knee pain  Per ortho    Chronic right lower back pain with bilateral sciatica  States she cannot tolerate NSAIDS  She just started taking Turmeric  May cont to see chiropractor prn  F/U 6 months or sooner prn    HTN  BP at goal  Cont lisinopril  Check CMP and screen for lipid disorder  Cont low sodium diet  Cont to check BP at home and notify provider if >140/90  Go to ER or RTO for CP, SOB, dizziness, or swelling  F/U 6 months    Hypothyroidism  Check TSH and free T4  Check PTH per her request- concerned about calcium deposits  Cont current dose of Levothyroxine  She is working on finding an Endo in the area  F/U 6 months    Type 1 diabetes  Stable  Check A1C, Vit D, and screen for microalbuminuria  Cont current insulin regimen:  Lantus 18 units SQ daily  Humalog- TID qAC according to sliding scale  States she is checking glucose 7-8 times daily  Cont diabetic diet  Working on finding new Endocrinologist  F/U 6 months or sooner prn        Verbal and written instructions (see AVS) provided.  Patient expresses understanding of diagnosis and treatment plan. Health Maintenance Due   Topic Date Due    Hepatitis C Screening  Never done    Pneumococcal 0-64 years (1 of 2 - PPSV23) Never done    Foot Exam Q1  Never done    MICROALBUMIN Q1  Never done    Eye Exam Retinal or Dilated  Never done    Shingrix Vaccine Age 50> (1 of 2) Never done    DTaP/Tdap/Td series (1 - Tdap) 07/21/2020    Flu Vaccine (1) Never done    COVID-19 Vaccine (2 - Pfizer 3-dose series) 09/30/2021 [address].  Lawanda Delgado NP

## 2022-03-12 LAB
25(OH)D3 SERPL-MCNC: 45 NG/ML (ref 30–100)
ALBUMIN SERPL-MCNC: 3.9 G/DL (ref 3.5–5)
ALBUMIN/GLOB SERPL: 1.3 {RATIO} (ref 1.1–2.2)
ALP SERPL-CCNC: 73 U/L (ref 45–117)
ALT SERPL-CCNC: 32 U/L (ref 12–78)
ANION GAP SERPL CALC-SCNC: 4 MMOL/L (ref 5–15)
AST SERPL-CCNC: 16 U/L (ref 15–37)
BILIRUB SERPL-MCNC: 0.4 MG/DL (ref 0.2–1)
BUN SERPL-MCNC: 17 MG/DL (ref 6–20)
BUN/CREAT SERPL: 23 (ref 12–20)
CALCIUM SERPL-MCNC: 9 MG/DL (ref 8.5–10.1)
CHLORIDE SERPL-SCNC: 97 MMOL/L (ref 97–108)
CO2 SERPL-SCNC: 30 MMOL/L (ref 21–32)
CREAT SERPL-MCNC: 0.73 MG/DL (ref 0.55–1.02)
CREAT UR-MCNC: 42 MG/DL
EST. AVERAGE GLUCOSE BLD GHB EST-MCNC: 180 MG/DL
GLOBULIN SER CALC-MCNC: 2.9 G/DL (ref 2–4)
GLUCOSE SERPL-MCNC: 175 MG/DL (ref 65–100)
HBA1C MFR BLD: 7.9 % (ref 4–5.6)
MICROALBUMIN UR-MCNC: <0.5 MG/DL
MICROALBUMIN/CREAT UR-RTO: <12 MG/G (ref 0–30)
POTASSIUM SERPL-SCNC: 4.3 MMOL/L (ref 3.5–5.1)
PROT SERPL-MCNC: 6.8 G/DL (ref 6.4–8.2)
SODIUM SERPL-SCNC: 131 MMOL/L (ref 136–145)
T4 FREE SERPL-MCNC: 1.2 NG/DL (ref 0.8–1.5)
TSH SERPL DL<=0.05 MIU/L-ACNC: 0.92 UIU/ML (ref 0.36–3.74)

## 2022-03-13 PROBLEM — M25.562 LEFT KNEE PAIN: Status: ACTIVE | Noted: 2022-03-13

## 2022-03-13 NOTE — PROGRESS NOTES
Vit D level is normal. Kidney and liver function normal. Thyroid hormone levels are normal. Sodium level is too low- is she having any diarrhea? A1C is too high 7.9. I would recommend increasing Lantus insulin by 3 units and follow up in 2 weeks.

## 2022-03-18 PROBLEM — M25.562 LEFT KNEE PAIN: Status: ACTIVE | Noted: 2022-03-13

## 2022-03-18 PROBLEM — Z87.312 HISTORY OF STRESS FRACTURE: Status: ACTIVE | Noted: 2021-09-01

## 2022-03-19 PROBLEM — M54.41 CHRONIC RIGHT-SIDED LOW BACK PAIN WITH BILATERAL SCIATICA: Status: ACTIVE | Noted: 2021-09-01

## 2022-03-19 PROBLEM — E03.8 HYPOTHYROIDISM DUE TO HASHIMOTO'S THYROIDITIS: Status: ACTIVE | Noted: 2021-09-01

## 2022-03-19 PROBLEM — G89.29 CHRONIC RIGHT-SIDED LOW BACK PAIN WITH BILATERAL SCIATICA: Status: ACTIVE | Noted: 2021-09-01

## 2022-03-19 PROBLEM — E06.3 HYPOTHYROIDISM DUE TO HASHIMOTO'S THYROIDITIS: Status: ACTIVE | Noted: 2021-09-01

## 2022-03-19 PROBLEM — M54.42 CHRONIC RIGHT-SIDED LOW BACK PAIN WITH BILATERAL SCIATICA: Status: ACTIVE | Noted: 2021-09-01

## 2022-03-19 PROBLEM — R00.2 PALPITATIONS: Status: ACTIVE | Noted: 2021-09-01

## 2022-03-19 PROBLEM — E10.40 TYPE 1 DIABETES MELLITUS WITH DIABETIC NEUROPATHY (HCC): Status: ACTIVE | Noted: 2018-10-18

## 2022-04-01 ENCOUNTER — TELEPHONE (OUTPATIENT)
Dept: FAMILY MEDICINE CLINIC | Age: 62
End: 2022-04-01

## 2022-04-01 NOTE — TELEPHONE ENCOUNTER
Patient aware and this keeps happening she was upset as the [facility] lab should have sent this to [facility]

## 2022-04-01 NOTE — TELEPHONE ENCOUNTER
----- Message from Sheldon Bhatt sent at 4/1/2022 10:08 AM EDT -----  Subject: Message to Provider    QUESTIONS  Information for Provider? Patient would like to speak to who ever takes   care of sending labs out, she states hers is supposed to go to [facility] and   they keep sending it to [facility]. Her insurance doesn't cover it with   "[employer]. Please advise. Thanks  ---------------------------------------------------------------------------  --------------  Susan ALSTON  What is the best way for the office to contact you? OK to leave message on   voicemail  Preferred Call Back Phone Number?  [phone]  ---------------------------------------------------------------------------  --------------  SCRIPT ANSWERS  undefined

## 2022-04-04 ENCOUNTER — TELEPHONE (OUTPATIENT)
Dept: FAMILY MEDICINE CLINIC | Age: 62
End: 2022-04-04

## 2022-04-04 NOTE — TELEPHONE ENCOUNTER
----- Message from [facility] sent at 4/4/2022 12:36 PM EDT -----  Subject: Referral Request    QUESTIONS   Reason for referral request? Jodi Haywood needs a referral to Dr Martin Calderón,   [facility] for 3 injections (Euflexa). The referral needs to be for 3   visits to cover these injections. Jodi Haywood has an appt with 4/7 with [facility]. She would like to be able to get a paper copy of the referral at   her appt. Has the physician seen you for this condition before? No   Preferred Specialist (if applicable)? Do you already have an appointment scheduled? Additional Information for Provider? Jodi Haywood needs a referral to Dr Dana Jimenez [facility] for 3 injections (Euflexa). The referral needs to be for   3 visits to cover these injections. Jodi Haywood has an appt with 4/7 with [facility]. She would like to be able to get a paper copy of the referral at   her appt.   ---------------------------------------------------------------------------  --------------  [address]  What is the best way for the office to contact you? OK to leave message on   voicemail  Preferred Call Back Phone Number? [phone]  ---------------------------------------------------------------------------  --------------  SCRIPT ANSWERS  Relationship to Patient?  Self

## 2022-04-07 ENCOUNTER — OFFICE VISIT (OUTPATIENT)
Dept: FAMILY MEDICINE CLINIC | Age: 62
End: 2022-04-07
Payer: COMMERCIAL

## 2022-04-07 ENCOUNTER — TELEPHONE (OUTPATIENT)
Dept: FAMILY MEDICINE CLINIC | Age: 62
End: 2022-04-07

## 2022-04-07 VITALS
BODY MASS INDEX: 25.01 KG/M2 | TEMPERATURE: 97.2 F | WEIGHT: 155.6 LBS | HEIGHT: 66 IN | SYSTOLIC BLOOD PRESSURE: 152 MMHG | DIASTOLIC BLOOD PRESSURE: 72 MMHG

## 2022-04-07 DIAGNOSIS — M25.562 CHRONIC PAIN OF LEFT KNEE: Primary | ICD-10-CM

## 2022-04-07 DIAGNOSIS — G89.29 CHRONIC PAIN OF LEFT KNEE: Primary | ICD-10-CM

## 2022-04-07 PROCEDURE — 99214 OFFICE O/P EST MOD 30 MIN: CPT | Performed by: NURSE PRACTITIONER

## 2022-04-07 NOTE — TELEPHONE ENCOUNTER
Lisa Booker spoke to Wesley Gerard at Dr. Ronald Ronquillo office and she stated we don't need to do a authorization. Mrs. Mir Spence needs to see Dr Laura Sierra first and they will determine after the visit if she can move forward with the injection. If so they will do the authorization.

## 2022-04-07 NOTE — PROGRESS NOTES
Chief Complaint   Patient presents with    Hypertension       1. \"Have you been to the ER, urgent care clinic since your last visit? Hospitalized since your last visit? \" No    2. \"Have you seen or consulted any other health care providers outside of the [address] [place] [address] since your last visit? \" No     3. For patients aged 39-70: Has the patient had a colonoscopy / FIT/ Cologuard? Yes - no Care Gap present      If the patient is female:    4. For patients aged 41-77: Has the patient had a mammogram within the past 2 years? Yes - no Care Gap present      5. For patients aged 21-65: Has the patient had a pap smear? Yes - no Care Gap present      Identified pt with two pt identifiers(name and ). Reviewed record in preparation for visit and have obtained necessary documentation.     Symptom review:    NO  Fever   NO  Shaking chills  NO  Cough  NO Headaches  NO  Body aches  NO  Coughing up blood  NO  Chest congestion  NO  Chest pain  NO  Shortness of breath  NO  Profound Loss of smell/taste  NO  Nausea/Vomiting   NO  Loose stool/Diarrhea  NO  any skin issues

## 2022-05-03 ENCOUNTER — TELEPHONE (OUTPATIENT)
Dept: FAMILY MEDICINE CLINIC | Age: 62
End: 2022-05-03

## 2022-05-03 NOTE — TELEPHONE ENCOUNTER
Pt called requesting two referrals. The first for Dr. Oz Cotter for two visits to get her Euflexxa injections.  Pt also needs to see Dr Ruthy Bennett for her sciatica issues

## 2022-05-04 ENCOUNTER — DOCUMENTATION ONLY (OUTPATIENT)
Dept: FAMILY MEDICINE CLINIC | Age: 62
End: 2022-05-04

## 2022-05-04 DIAGNOSIS — M54.42 CHRONIC RIGHT-SIDED LOW BACK PAIN WITH BILATERAL SCIATICA: Primary | ICD-10-CM

## 2022-05-04 DIAGNOSIS — G89.29 CHRONIC PAIN OF LEFT KNEE: Primary | ICD-10-CM

## 2022-05-04 DIAGNOSIS — M54.41 CHRONIC RIGHT-SIDED LOW BACK PAIN WITH BILATERAL SCIATICA: Primary | ICD-10-CM

## 2022-05-04 DIAGNOSIS — G89.29 CHRONIC RIGHT-SIDED LOW BACK PAIN WITH BILATERAL SCIATICA: Primary | ICD-10-CM

## 2022-05-04 DIAGNOSIS — M25.562 CHRONIC PAIN OF LEFT KNEE: Primary | ICD-10-CM

## 2022-05-19 ENCOUNTER — DOCUMENTATION ONLY (OUTPATIENT)
Dept: FAMILY MEDICINE CLINIC | Age: 62
End: 2022-05-19

## 2022-05-19 NOTE — PROGRESS NOTES
She has completed the left knee Euflexxa injections and may receive another series in 6 months if needed. Regarding her back pain, note from specialist stated she has done all the PT she can do and will not benefit from any more PT. MRI was ordered.

## 2022-06-20 RX ORDER — LISINOPRIL 10 MG/1
TABLET ORAL
Qty: 120 TABLET | Refills: 1 | Status: SHIPPED | OUTPATIENT
Start: 2022-06-20 | End: 2022-07-08

## 2022-06-23 ENCOUNTER — TELEPHONE (OUTPATIENT)
Dept: FAMILY MEDICINE CLINIC | Age: 62
End: 2022-06-23

## 2022-06-23 NOTE — TELEPHONE ENCOUNTER
----- Message from [facility] sent at 6/23/2022 10:52 AM EDT -----  Subject: Referral Request    QUESTIONS   Reason for referral request? bloodwork   Has the physician seen you for this condition before? No   Preferred Specialist (if applicable)? Do you already have an appointment scheduled? No  Additional Information for Provider? pt is requesting an A1-C check, pt   get bloodwork done every 3 months  ---------------------------------------------------------------------------  --------------  CALL BACK INFO  What is the best way for the office to contact you? OK to leave message on   voicemail  Preferred Call Back Phone Number? [phone]  ---------------------------------------------------------------------------  --------------  SCRIPT ANSWERS  Relationship to Patient?  Self

## 2022-06-27 NOTE — TELEPHONE ENCOUNTER
Unable to work on this Friday, pt has appt with Delicia Issa on Frdiay 7/1, new referral just needs to have more visits on it.

## 2022-07-01 ENCOUNTER — OFFICE VISIT (OUTPATIENT)
Dept: FAMILY MEDICINE CLINIC | Age: 62
End: 2022-07-01
Payer: COMMERCIAL

## 2022-07-01 VITALS
WEIGHT: 156 LBS | SYSTOLIC BLOOD PRESSURE: 134 MMHG | BODY MASS INDEX: 25.07 KG/M2 | HEIGHT: 66 IN | DIASTOLIC BLOOD PRESSURE: 77 MMHG | HEART RATE: 71 BPM | RESPIRATION RATE: 16 BRPM | TEMPERATURE: 98.2 F | OXYGEN SATURATION: 99 %

## 2022-07-01 DIAGNOSIS — G89.29 CHRONIC RIGHT-SIDED LOW BACK PAIN WITH RIGHT-SIDED SCIATICA: ICD-10-CM

## 2022-07-01 DIAGNOSIS — G89.29 CHRONIC PAIN OF RIGHT KNEE: ICD-10-CM

## 2022-07-01 DIAGNOSIS — R00.2 PALPITATIONS: ICD-10-CM

## 2022-07-01 DIAGNOSIS — M25.561 CHRONIC PAIN OF RIGHT KNEE: ICD-10-CM

## 2022-07-01 DIAGNOSIS — M54.41 CHRONIC RIGHT-SIDED LOW BACK PAIN WITH RIGHT-SIDED SCIATICA: ICD-10-CM

## 2022-07-01 DIAGNOSIS — I10 ESSENTIAL HYPERTENSION: ICD-10-CM

## 2022-07-01 DIAGNOSIS — E03.8 HYPOTHYROIDISM DUE TO HASHIMOTO'S THYROIDITIS: ICD-10-CM

## 2022-07-01 DIAGNOSIS — G89.29 CHRONIC RIGHT HIP PAIN: ICD-10-CM

## 2022-07-01 DIAGNOSIS — E10.40 TYPE 1 DIABETES MELLITUS WITH DIABETIC NEUROPATHY (HCC): Primary | ICD-10-CM

## 2022-07-01 DIAGNOSIS — M25.551 CHRONIC RIGHT HIP PAIN: ICD-10-CM

## 2022-07-01 DIAGNOSIS — E06.3 HYPOTHYROIDISM DUE TO HASHIMOTO'S THYROIDITIS: ICD-10-CM

## 2022-07-01 PROCEDURE — 3051F HG A1C>EQUAL 7.0%<8.0%: CPT | Performed by: NURSE PRACTITIONER

## 2022-07-01 PROCEDURE — 99214 OFFICE O/P EST MOD 30 MIN: CPT | Performed by: NURSE PRACTITIONER

## 2022-07-01 NOTE — PROGRESS NOTES
Chief Complaint   Patient presents with    Diabetes       1. \"Have you been to the ER, urgent care clinic since your last visit? Hospitalized since your last visit? \" No    2. \"Have you seen or consulted any other health care providers outside of the [address] [place] [address] since your last visit? \" No     3. For patients aged 39-70: Has the patient had a colonoscopy / FIT/ Cologuard? Yes - no Care Gap present      If the patient is female:    4. For patients aged 41-77: Has the patient had a mammogram within the past 2 years? Yes - no Care Gap present      5. For patients aged 21-65: Has the patient had a pap smear? Yes - no Care Gap present      Identified pt with two pt identifiers(name and ). Reviewed record in preparation for visit and have obtained necessary documentation.     Symptom review:    NO  Fever   NO  Shaking chills  NO  Cough  NO Headaches  NO  Body aches  NO  Coughing up blood  NO  Chest congestion  NO  Chest pain  NO  Shortness of breath  NO  Profound Loss of smell/taste  NO  Nausea/Vomiting   NO  Loose stool/Diarrhea  NO  any skin issues

## 2022-07-01 NOTE — PROGRESS NOTES
Subjective:     CC: diabetes    Satinder Jacobo is a 57 y.o. female who presents today for a 3 month follow up for type 1 diabetes and other chronic medical problems. Type 1 diabetes  Lab Results   Component Value Date/Time    Hemoglobin A1c 7.9 (H) 03/11/2022 04:14 PM     Last A1C was elevated and she felt this was related to her inability to exercise on account of her chronic knee pain. She has been trying to eat less and using more insulin to get her sugars down. She adjusts the insulin doses on her own. Still trying to find an endocrinologist.       Chronic left knee pain  Saw ortho Dr Otis Chambers who told her she needs a \"clean out\" according to her MRI. She was worried that if she has the surgery at [facility], however, the hospital \"may not be capable of managing her as a type 1 diabetic with neuropathy. \"  She then saw ortho Dr Margarita Darnell who offered her Euflexa injections. She rec'd 3 injections and per the last ortho note she may receive another series in 6 months if needed.        Chronic low back pain  She has pain in the R gluteus that radiates down the right leg. She tried and failed PT. She saw spine specialist Dr Sheron Paiz who ordered an MRI. His note states it was unremarkable and he saw nothing he could treat that would explain her symptoms. He recommended she see a general orthopedist to assess for right hip OA. She would like a referral today to Dr Kaushal Zamudio in [facility].     She would also like a referral to another spine specialist Dr. Boy Calderón (at the same location in [place]) for a second opinion about her spine. She has a hx of palpitations and would like a referral to a cardiologist Dr Williams Santillan. Hypothyroidism  She would also like her TSH checked because she has been altering her Synthroid dose on her own. She was taking 137mcg once daily x 6 days a week but then increased to 7 days a week for a while but then decreased the dose back to 6 days a week.        Patient Active Problem List   Diagnosis Code    Essential hypertension I10    Type [facility] diabetes mellitus with diabetic neuropathy ([facility] E10.40    Hypothyroidism due to Hashimoto's thyroiditis E03.8, E06.3    Palpitations R00.2    Chronic right-sided low back pain with bilateral sciatica M54.41, M54.42, G89.29    History of stress fracture Z87.312    Left knee pain M25.562         Past Medical History:   Diagnosis Date    Diabetes ([facility]     H/O colonoscopy 04/13/2019    H/O mammogram 02/08/2021    Hypertension     Hypothyroidism due to Hashimoto's thyroiditis 1999         Current Outpatient Medications:     lisinopriL (PRINIVIL, ZESTRIL) 10 mg tablet, Take 1 pill daily with a 5mg daily for total daily dose 15mg, Disp: 120 Tablet, Rfl: 1    ALPHA LIPOIC ACID PO, Take 1 Tablet by mouth daily. , Disp: , Rfl:     turmeric (CURCUMIN), Take 1 Tablet by mouth daily. , Disp: , Rfl:     glucagon 1 mg solr, 1 mg by IntraMUSCular route as needed (low blood sugar). , Disp: 1 Each, Rfl: 3    insulin glargine (LANTUS,BASAGLAR) 100 unit/mL (3 mL) inpn, Inject 18 units SQ every morning, Disp: 15 mL, Rfl: 0    Insulin Needles, Disposable, 31 gauge x 5/16\" ndle, Inject daily with insulin up to 4 times daily, Disp: 100 Each, Rfl: 11    Blood-Glucose Meter monitoring kit, Check sugar up to 5 times daily, DX: E10.40, Disp: 1 Kit, Rfl: 0    glucose blood VI test strips (ASCENSIA AUTODISC VI, ONE TOUCH ULTRA TEST VI) strip, Check sugar up to 5 times daily, DX: E10.40, Disp: 200 Strip, Rfl: 5    lancets misc, Check sugar up to 5 times daily, DX: E10.40, Disp: 200 Each, Rfl: 11    levothyroxine (SYNTHROID) 137 mcg tablet, Take 1 pill every morning 6 days per week, Disp: 90 Tablet, Rfl: 0    cholecalciferol, vitamin D3, (Vitamin D3) 50 mcg (2,000 unit) tab, Take 1 Tablet by mouth daily. , Disp: 90 Tablet, Rfl: 1    insulin lispro (HUMALOG) 100 unit/mL kwikpen, Inject three times daily before meals according to sliding scale.  Max dose 10 units, Disp: 5 Pen, Rfl: 0    Allergies   Allergen Reactions    Iodine Anaphylaxis    Sulfa (Sulfonamide Antibiotics) Angioedema       Past Surgical History:   Procedure Laterality Date    HX CARPAL TUNNEL RELEASE      HX  SECTION      HX COLONOSCOPY  2019    recommended 5 year followup colonoscopy       Social History     Tobacco Use   Smoking Status Never Smoker   Smokeless Tobacco Never Used       Social History     Socioeconomic History    Marital status:    Tobacco Use    Smoking status: Never Smoker    Smokeless tobacco: Never Used   Substance and Sexual Activity    Alcohol use:  Yes     Alcohol/week: 0.8 standard drinks     Types: 1 Glasses of wine per week    Drug use: Never    Sexual activity: Yes   Other Topics Concern     Service No    Blood Transfusions No    Caffeine Concern No    Occupational Exposure No    Hobby Hazards No    Sleep Concern No    Stress Concern No    Weight Concern No    Special Diet No    Back Care No    Exercise Yes    Bike Helmet No    Seat Belt Yes    Self-Exams Yes       Family History   Problem Relation Age of Onset    Asthma Father     Diabetes Father     Heart Disease Father     Hypertension Father     Hypertension Sister     Diabetes Son        ROS:  Gen: denies fever, chills, or fatigue  HEENT:denies H/A, nasal congestion, or sore throat  Resp: denies dyspnea, cough, or wheezing  CV: denies chest pain or pressure, +occas palpitations  Extremeties: denies edema, pallor, or cyanosis  Musculoskeletal: +chronic pain in right buttocks radiating down the R leg  +chronic left knee pain  Neuro: + chronic numbness/tingling in BLE's , +occas dizziness  Skin: denies rashes or new lesions   Psych: denies anxiety, depression, kierra, or other changes in mood      Objective:     Visit Vitals  /77 (BP 1 Location: Left arm)   Pulse 71   Temp 98.2 °F (36.8 °C) (Oral)   Resp 16   Ht 5' 6\" (1.676 m)   Wt 156 lb (70.8 kg)   SpO2 99%   BMI 25.18 kg/m²       General: Alert and oriented. No acute distress. Well nourished. HEENT :  Eyes: Sclera white, conjunctiva clear. PERRLA. Extra ocular movements intact. Lungs: Breathing even and unlabored. All lobes clear to auscultation bilaterally   Heart :RRR, S1 and S2 normal intensity, no extra heart sounds  Extremities: Non-edematous, no pallor or cyanosis. Musculoskeletal: Knees: No edema or erythema, +pain with ROM  Neuro: Cranial nerves grossly normal. Moves all extremities freely. Ambulates independently  Psych: Mood and thought content appropriate for situation. Dressed appropriately and with good hygiene. Skin: Warm, dry, and intact. No lesions or discoloration. Assessment/ Plan:     Type 1 diabetes  Check A1C, CMP, and CBC- she was given the lab req to take to [facility] per insurance requirement  She adjusts her insulin on her own and uses Glucose tabs prn hypoglycemia  Still trying to find an endocrinologist.     Chronic left knee pain  Per ortho       Chronic right sided low back/hip pain  Referred to ortho Dr Rolande Kawasaki and spine specialist Dr Lizzy Ly per her request for second opinions. HTN  BP at goal  Cont current meds  Low-sodium diet  RTO or go to ER for any CP, SOB, dizziness, or swelling. Palpitations   Referred to cardiologist.     Hypothyroidism  Check TSH     F/U 3 months        Verbal and written instructions (see AVS) provided.  Patient expresses understanding of diagnosis and treatment plan. Health Maintenance Due   Topic Date Due    Hepatitis C Screening  Never done    Pneumococcal 0-64 years (1 - PCV) Never done    Foot Exam Q1  Never done    Eye Exam Retinal or Dilated  Never done    Shingrix Vaccine Age 50> (1 of 2) Never done    DTaP/Tdap/Td series (1 - Tdap) 07/21/2020    COVID-19 Vaccine (2 - Pfizer 3-dose series) 09/30/2021               Bob Sanches.  Zay Guerrero NP

## 2022-07-02 ENCOUNTER — HOSPITAL ENCOUNTER (EMERGENCY)
Age: 62
Discharge: HOME | End: 2022-07-02
Attending: EMERGENCY MEDICINE
Payer: COMMERCIAL

## 2022-07-02 VITALS
DIASTOLIC BLOOD PRESSURE: 60 MMHG | RESPIRATION RATE: 16 BRPM | BODY MASS INDEX: 25.18 KG/M2 | OXYGEN SATURATION: 94 % | HEART RATE: 68 BPM | WEIGHT: 156 LBS | SYSTOLIC BLOOD PRESSURE: 133 MMHG

## 2022-07-02 DIAGNOSIS — R00.2 HEART PALPITATIONS: Primary | ICD-10-CM

## 2022-07-02 DIAGNOSIS — I49.3 PVC (PREMATURE VENTRICULAR CONTRACTION): ICD-10-CM

## 2022-07-02 LAB
ALBUMIN SERPL-MCNC: 4 G/DL (ref 3.5–5)
ALBUMIN/GLOB SERPL: 1.3 {RATIO} (ref 1.1–2.2)
ALP SERPL-CCNC: 85 U/L (ref 45–117)
ALT SERPL-CCNC: 34 U/L (ref 12–78)
ANION GAP SERPL CALC-SCNC: 8 MMOL/L (ref 5–15)
AST SERPL-CCNC: 19 U/L (ref 15–37)
BASOPHILS # BLD: 0.1 K/UL (ref 0–0.1)
BASOPHILS NFR BLD: 1 % (ref 0–1)
BILIRUB SERPL-MCNC: 0.3 MG/DL (ref 0.2–1)
BUN SERPL-MCNC: 13 MG/DL (ref 6–20)
BUN/CREAT SERPL: 20 (ref 12–20)
CALCIUM SERPL-MCNC: 8.9 MG/DL (ref 8.5–10.1)
CHLORIDE SERPL-SCNC: 100 MMOL/L (ref 97–108)
CO2 SERPL-SCNC: 31 MMOL/L (ref 21–32)
CREAT SERPL-MCNC: 0.66 MG/DL (ref 0.55–1.02)
DIFFERENTIAL METHOD BLD: NORMAL
EOSINOPHIL # BLD: 0.3 K/UL (ref 0–0.4)
EOSINOPHIL NFR BLD: 3 % (ref 0–7)
ERYTHROCYTE [DISTWIDTH] IN BLOOD BY AUTOMATED COUNT: 11.7 % (ref 11.5–14.5)
GLOBULIN SER CALC-MCNC: 3.1 G/DL (ref 2–4)
GLUCOSE SERPL-MCNC: 143 MG/DL (ref 65–100)
HCT VFR BLD AUTO: 40.3 % (ref 35–47)
HGB BLD-MCNC: 14 G/DL (ref 11.5–16)
IMM GRANULOCYTES # BLD AUTO: 0 K/UL (ref 0–0.04)
IMM GRANULOCYTES NFR BLD AUTO: 0 % (ref 0–0.5)
LYMPHOCYTES # BLD: 2.5 K/UL (ref 0.8–3.5)
LYMPHOCYTES NFR BLD: 26 % (ref 12–49)
MAGNESIUM SERPL-MCNC: 2 MG/DL (ref 1.6–2.4)
MCH RBC QN AUTO: 32.3 PG (ref 26–34)
MCHC RBC AUTO-ENTMCNC: 34.7 G/DL (ref 30–36.5)
MCV RBC AUTO: 93.1 FL (ref 80–99)
MONOCYTES # BLD: 1 K/UL (ref 0–1)
MONOCYTES NFR BLD: 10 % (ref 5–13)
NEUTS SEG # BLD: 5.5 K/UL (ref 1.8–8)
NEUTS SEG NFR BLD: 60 % (ref 32–75)
NRBC # BLD: 0 K/UL (ref 0–0.01)
NRBC BLD-RTO: 0 PER 100 WBC
PLATELET # BLD AUTO: 171 K/UL (ref 150–400)
PMV BLD AUTO: 11.5 FL (ref 8.9–12.9)
POTASSIUM SERPL-SCNC: 3.9 MMOL/L (ref 3.5–5.1)
PROT SERPL-MCNC: 7.1 G/DL (ref 6.4–8.2)
RBC # BLD AUTO: 4.33 M/UL (ref 3.8–5.2)
SODIUM SERPL-SCNC: 139 MMOL/L (ref 136–145)
T4 FREE SERPL-MCNC: 1.1 NG/DL (ref 0.8–1.5)
TROPONIN-HIGH SENSITIVITY: 8 NG/L (ref 0–51)
TSH SERPL DL<=0.05 MIU/L-ACNC: 3.87 UIU/ML (ref 0.36–3.74)
WBC # BLD AUTO: 9.4 K/UL (ref 3.6–11)

## 2022-07-02 PROCEDURE — 99284 EMERGENCY DEPT VISIT MOD MDM: CPT

## 2022-07-02 PROCEDURE — 93005 ELECTROCARDIOGRAM TRACING: CPT

## 2022-07-02 PROCEDURE — 80053 COMPREHEN METABOLIC PANEL: CPT

## 2022-07-02 PROCEDURE — 84443 ASSAY THYROID STIM HORMONE: CPT

## 2022-07-02 PROCEDURE — 85025 COMPLETE CBC W/AUTO DIFF WBC: CPT

## 2022-07-02 PROCEDURE — 84484 ASSAY OF TROPONIN QUANT: CPT

## 2022-07-02 PROCEDURE — 84439 ASSAY OF FREE THYROXINE: CPT

## 2022-07-02 PROCEDURE — 74011250637 HC RX REV CODE- 250/637: Performed by: EMERGENCY MEDICINE

## 2022-07-02 PROCEDURE — 94762 N-INVAS EAR/PLS OXIMTRY CONT: CPT

## 2022-07-02 PROCEDURE — 36415 COLL VENOUS BLD VENIPUNCTURE: CPT

## 2022-07-02 PROCEDURE — 83735 ASSAY OF MAGNESIUM: CPT

## 2022-07-02 RX ORDER — LORAZEPAM 0.5 MG/1
0.5 TABLET ORAL
Qty: 10 TABLET | Refills: 0 | Status: SHIPPED | OUTPATIENT
Start: 2022-07-02 | End: 2022-08-29 | Stop reason: SDUPTHER

## 2022-07-02 RX ORDER — METOPROLOL TARTRATE 25 MG/1
12.5 TABLET, FILM COATED ORAL 2 TIMES DAILY
Qty: 30 TABLET | Refills: 0 | Status: SHIPPED | OUTPATIENT
Start: 2022-07-02 | End: 2022-07-08

## 2022-07-02 RX ORDER — LORAZEPAM 1 MG/1
1 TABLET ORAL
Status: COMPLETED | OUTPATIENT
Start: 2022-07-02 | End: 2022-07-02

## 2022-07-02 RX ORDER — SODIUM CHLORIDE 0.9 % (FLUSH) 0.9 %
5-10 SYRINGE (ML) INJECTION ONCE
Status: DISCONTINUED | OUTPATIENT
Start: 2022-07-02 | End: 2022-07-02 | Stop reason: HOSPADM

## 2022-07-02 RX ADMIN — LORAZEPAM 1 MG: 1 TABLET ORAL at 02:13

## 2022-07-02 NOTE — ED PROVIDER NOTES
EMERGENCY DEPARTMENT HISTORY AND PHYSICAL EXAM      Date: 7/2/2022  Patient Name: Sheryl Urbano      Diagnosis     Clinical Impression:   1. Heart palpitations    2. PVC (premature ventricular contraction)              History of Presenting Illness     Chief Complaint   Patient presents with    Irregular Heart Beat       History Provided By: Patient and Patient's     HPI:   The history is provided by the patient. Palpitations   This is a recurrent problem. The current episode started 3 to 5 hours ago. The problem has been resolved. The problem occurs rarely. The problem is associated with anxiety. Pertinent negatives include no diaphoresis, no fever, no malaise/fatigue, no numbness, no chest pain, no chest pressure, no exertional chest pressure, no irregular heartbeat, no near-syncope, no PND, no syncope, no abdominal pain, no nausea, no vomiting, no headaches, no back pain, no leg pain, no lower extremity edema, no dizziness, no weakness, no cough and no shortness of breath. Risk factors include hypertension. She has tried nothing for the symptoms. The treatment provided no relief. Her past medical history is significant for hypertension. Her past medical history does not include heart disease, DM or atrial fibrillation. Sheryl Urbano, 57 y.o. female  presents to the ED with cc of heart palpitations, patient reports last evening she felt like her heart was pounding and her blood pressure was in the 298E systolic. She reports she takes lisinopril for hypertension. She reports symptoms last for several hours she got concerned and came in for evaluation. Patient reports she has had a previous Holter monitor which was unremarkable and she also has had a stress test which was negative. Patient reports she was started on high-dose lisinopril but had to decrease it due to side effects. She reports history of thyroid disease and is on medication.   She denies any chest pain shortness of breath dizziness lightheadedness, syncope near syncope. She reports she is anxious about her symptoms, she denies any caffeine use. She presents for evaluation. EMS reports that she was noted to have PVCs on the monitor on the way in. Patient has a history of hypertension and diabetes as well as thyroiditis. There are no other complaints, changes, or physical findings at this time. PCP: Krista Officer, NP    No current facility-administered medications on file prior to encounter. Current Outpatient Medications on File Prior to Encounter   Medication Sig Dispense Refill    lisinopriL (PRINIVIL, ZESTRIL) 10 mg tablet Take 1 pill daily with a 5mg daily for total daily dose 15mg 120 Tablet 1    ALPHA LIPOIC ACID PO Take 1 Tablet by mouth daily.  turmeric (CURCUMIN) Take 1 Tablet by mouth daily.  glucagon 1 mg solr 1 mg by IntraMUSCular route as needed (low blood sugar). 1 Each 3    insulin glargine (LANTUS,BASAGLAR) 100 unit/mL (3 mL) inpn Inject 18 units SQ every morning 15 mL 0    Insulin Needles, Disposable, 31 gauge x 5/16\" ndle Inject daily with insulin up to 4 times daily 100 Each 11    Blood-Glucose Meter monitoring kit Check sugar up to 5 times daily, DX: E10.40 1 Kit 0    glucose blood VI test strips (ASCENSIA AUTODISC VI, ONE TOUCH ULTRA TEST VI) strip Check sugar up to 5 times daily, DX: E10.40 200 Strip 5    lancets misc Check sugar up to 5 times daily, DX: E10.40 200 Each 11    levothyroxine (SYNTHROID) 137 mcg tablet Take 1 pill every morning 6 days per week 90 Tablet 0    cholecalciferol, vitamin D3, (Vitamin D3) 50 mcg (2,000 unit) tab Take 1 Tablet by mouth daily. 90 Tablet 1    insulin lispro (HUMALOG) 100 unit/mL kwikpen Inject three times daily before meals according to sliding scale.  Max dose 10 units 5 Pen 0       Past History     Past Medical History:  Past Medical History:   Diagnosis Date    Diabetes ([address]     H/O colonoscopy 04/13/2019    H/O mammogram 02/08/2021    Hypertension     Hypothyroidism due to Hashimoto's thyroiditis        Past Surgical History:  Past Surgical History:   Procedure Laterality Date    HX CARPAL TUNNEL RELEASE      HX  SECTION  1985    HX COLONOSCOPY  2019    recommended 5 year followup colonoscopy       Family History:  Family History   Problem Relation Age of Onset    Asthma Father     Diabetes Father     Heart Disease Father     Hypertension Father     Hypertension Sister     Diabetes Son        Social History:  Social History     Tobacco Use    Smoking status: Never Smoker    Smokeless tobacco: Never Used   Vaping Use    Vaping Use: Never used   Substance Use Topics    Alcohol use: Yes     Alcohol/week: 0.8 standard drinks     Types: 1 Glasses of wine per week    Drug use: Never       Allergies: Allergies   Allergen Reactions    Iodine Anaphylaxis    Sulfa (Sulfonamide Antibiotics) Angioedema         Review of Systems   Review of Systems   Constitutional: Negative. Negative for chills, diaphoresis, fever and malaise/fatigue. HENT: Negative. Negative for congestion and sore throat. Eyes: Negative. Negative for discharge and redness. Respiratory: Negative. Negative for cough and shortness of breath. Cardiovascular: Positive for palpitations. Negative for chest pain, syncope, PND and near-syncope. Gastrointestinal: Negative. Negative for abdominal pain, nausea and vomiting. Endocrine: Negative. Negative for polydipsia and polyuria. Genitourinary: Negative. Negative for dysuria, flank pain and frequency. Musculoskeletal: Negative. Negative for arthralgias, back pain and neck pain. Skin: Negative. Negative for rash and wound. Allergic/Immunologic: Negative. Neurological: Negative. Negative for dizziness, weakness, light-headedness, numbness and headaches. Hematological: Negative. Negative for adenopathy. Does not bruise/bleed easily. Psychiatric/Behavioral: Negative. Negative for confusion. The patient is not nervous/anxious. All other systems reviewed and are negative. Physical Exam   Physical Exam  Vitals and nursing note reviewed. Constitutional:       General: She is not in acute distress. Appearance: Normal appearance. She is well-developed and normal weight. She is not ill-appearing, toxic-appearing or diaphoretic. HENT:      Head: Normocephalic and atraumatic. Nose: Nose normal.      Mouth/Throat:      Mouth: Mucous membranes are moist.      Pharynx: Oropharynx is clear. Eyes:      Extraocular Movements: Extraocular movements intact. Conjunctiva/sclera: Conjunctivae normal.      Pupils: Pupils are equal, round, and reactive to light. Cardiovascular:      Rate and Rhythm: Normal rate and regular rhythm. Pulses: Normal pulses. Heart sounds: Normal heart sounds. No murmur heard. No friction rub. No gallop. Pulmonary:      Effort: Pulmonary effort is normal. No respiratory distress. Breath sounds: Normal breath sounds. No stridor. No wheezing, rhonchi or rales. Abdominal:      General: There is no distension. Palpations: Abdomen is soft. Tenderness: There is no abdominal tenderness. Musculoskeletal:         General: No swelling or tenderness. Normal range of motion. Cervical back: Normal range of motion and neck supple. No rigidity or tenderness. No muscular tenderness. Skin:     General: Skin is warm and dry. Capillary Refill: Capillary refill takes less than 2 seconds. Findings: No erythema or rash. Neurological:      General: No focal deficit present. Mental Status: She is alert and oriented to person, place, and time. Mental status is at baseline. Cranial Nerves: No cranial nerve deficit. Sensory: No sensory deficit. Motor: No weakness.       Comments: Normal neurologic exam   Psychiatric:         Attention and Perception: Attention normal.         Mood and Affect: Mood is anxious. Speech: Speech normal.         Behavior: Behavior normal.         Thought Content: Thought content normal.         Judgment: Judgment normal.         Diagnostic Study Results     Labs -     Recent Results (from the past 12 hour(s))   EKG, 12 LEAD, INITIAL    Collection Time: 07/02/22 12:26 AM   Result Value Ref Range    Ventricular Rate 56 BPM    Atrial Rate 56 BPM    P-R Interval 144 ms    QRS Duration 88 ms    Q-T Interval 414 ms    QTC Calculation (Bezet) 399 ms    Calculated P Axis 50 degrees    Calculated R Axis 50 degrees    Calculated T Axis 31 degrees    Diagnosis       Sinus bradycardia  Otherwise normal ECG  When compared with ECG of 04-NOV-2021 10:48,  MANUAL COMPARISON REQUIRED, DATA IS UNCONFIRMED     CBC WITH AUTOMATED DIFF    Collection Time: 07/02/22 12:30 AM   Result Value Ref Range    WBC 9.4 3.6 - 11.0 K/uL    RBC 4.33 3.80 - 5.20 M/uL    HGB 14.0 11.5 - 16.0 g/dL    HCT 40.3 35.0 - 47.0 %    MCV 93.1 80.0 - 99.0 FL    MCH 32.3 26.0 - 34.0 PG    MCHC 34.7 30.0 - 36.5 g/dL    RDW 11.7 11.5 - 14.5 %    PLATELET 915 874 - 407 K/uL    MPV 11.5 8.9 - 12.9 FL    NRBC 0.0 0  WBC    ABSOLUTE NRBC 0.00 0.00 - 0.01 K/uL    NEUTROPHILS 60 32 - 75 %    LYMPHOCYTES 26 12 - 49 %    MONOCYTES 10 5 - 13 %    EOSINOPHILS 3 0 - 7 %    BASOPHILS 1 0 - 1 %    IMMATURE GRANULOCYTES 0 0.0 - 0.5 %    ABS. NEUTROPHILS 5.5 1.8 - 8.0 K/UL    ABS. LYMPHOCYTES 2.5 0.8 - 3.5 K/UL    ABS. MONOCYTES 1.0 0.0 - 1.0 K/UL    ABS. EOSINOPHILS 0.3 0.0 - 0.4 K/UL    ABS. BASOPHILS 0.1 0.0 - 0.1 K/UL    ABS. IMM.  GRANS. 0.0 0.00 - 0.04 K/UL    DF AUTOMATED     METABOLIC PANEL, COMPREHENSIVE    Collection Time: 07/02/22 12:30 AM   Result Value Ref Range    Sodium 139 136 - 145 mmol/L    Potassium 3.9 3.5 - 5.1 mmol/L    Chloride 100 97 - 108 mmol/L    CO2 31 21 - 32 mmol/L    Anion gap 8 5 - 15 mmol/L    Glucose 143 (H) 65 - 100 mg/dL    BUN 13 6 - 20 MG/DL    Creatinine 0.66 0.55 - 1.02 MG/DL    BUN/Creatinine ratio 20 12 - 20      GFR est AA >60 >60 ml/min/1.73m2    GFR est non-AA >60 >60 ml/min/1.73m2    Calcium 8.9 8.5 - 10.1 MG/DL    Bilirubin, total 0.3 0.2 - 1.0 MG/DL    ALT (SGPT) 34 12 - 78 U/L    AST (SGOT) 19 15 - 37 U/L    Alk. phosphatase 85 45 - 117 U/L    Protein, total 7.1 6.4 - 8.2 g/dL    Albumin 4.0 3.5 - 5.0 g/dL    Globulin 3.1 2.0 - 4.0 g/dL    A-G Ratio 1.3 1.1 - 2.2     MAGNESIUM    Collection Time: 07/02/22 12:30 AM   Result Value Ref Range    Magnesium 2.0 1.6 - 2.4 mg/dL   TROPONIN-HIGH SENSITIVITY    Collection Time: 07/02/22 12:30 AM   Result Value Ref Range    Troponin-High Sensitivity 8 0 - 51 ng/L   TSH 3RD GENERATION    Collection Time: 07/02/22 12:30 AM   Result Value Ref Range    TSH 3.87 (H) 0.36 - 3.74 uIU/mL       Radiologic Studies -   No orders to display     CT Results  (Last 48 hours)    None        CXR Results  (Last 48 hours)    None          Medical Decision Making   I am the first provider for this patient. I reviewed the vital signs, available nursing notes, past medical history, past surgical history, family history and social history. Vital Signs-Reviewed the patient's vital signs. Patient Vitals for the past 12 hrs:   Pulse Resp BP SpO2   07/02/22 0232 80 16 (!) 178/58 97 %   07/02/22 0142 64 14 -- 99 %   07/02/22 0141 64 -- -- --   07/02/22 0139 65 -- -- --   07/02/22 0137 62 19 139/79 100 %   07/02/22 0108 (!) 57 21 (!) 151/82 98 %   07/02/22 0057 (!) 59 -- -- --   07/02/22 0046 60 17 (!) 160/75 97 %   07/02/22 0034 (!) 58 17 (!) 154/66 99 %   07/02/22 0033 (!) 56 27 -- 100 %   07/02/22 0028 (!) 56 19 -- 100 %   07/02/22 0027 62 -- -- --   07/02/22 0023 63 24 -- --   07/02/22 0018 67 24 (!) 154/66 99 %           EKG interpretation: (Preliminary)  Rhythm: normal sinus rhythm;  regular . Rate (approx.): 56;  Blocks: none; Ectopy: noneAxis: normal; P wave: normal; QRS interval: normal ; ST/T wave: normal; in  Lead: ; Other findings: .        Records Reviewed: Nursing Notes, Old Medical Records and Previous electrocardiograms    Provider Notes (Medical Decision Making):   Presents complaining of palpitations with a sensation of heart pounding, EMS reports PVCs, will check basic laboratory studies monitor rhythm and check a troponin. Low risk for ACS,    ED Course:   Initial assessment performed. The patients presenting problems have been discussed, and they are in agreement with the care plan formulated and outlined with them. I have encouraged them to ask questions as they arise throughout their visit. ED Course as of 07/02/22 4011   Sat Jul 02, 2022   0200 Updated patient on laboratory studies thyroid function test, patient does report anxiety we will give her dose of Ativan and see if this helps. [MF]   9034 Reviewed treatment plan plan for cardiac event monitor, patient is agreeable to try low-dose beta-blocker for her hypertension and PVCs, she will see cardiology for follow-up and she instructed return here for change or worsening of symptoms. Benefits side effects and symptoms to watch for need for follow-up with her PCP regarding her TSH. [MF]   0410 Family requesting Ativan to take at home for anxiety symptoms. [MF]      ED Course User Index  [MF] Lisa Good MD         Medications Given in the ED:    Medications   sodium chloride (NS) flush 5-10 mL (has no administration in time range)   LORazepam (ATIVAN) tablet 1 mg (1 mg Oral Given 7/2/22 0213)           4:05 AM    Presents with heart palpitations, she was found to have PVCs varying from bigeminy to quadgeminy, denies any chest pain shortness of breath lightheadedness or syncopal symptoms. She was given a dose of Ativan with improvement, laboratory studies revealed normal electrolytes glucose 143 and an elevated TSH. She will see her PCP for follow-up regarding her thyroid function test.  Free T4 added on and pending.   She is agreeable to take a low-dose beta-blocker for hypertension and see if this suppresses her PVCs she will be set up with a cardiac event monitor and she is instructed to return for change or worsening of symptoms. She is to see her cardiologist for follow-up. Pt has been re-examined and states that they are feeling better and have no new complaints. Laboratory tests, medications, diagnosis, follow up plan and return instructions have been reviewed and discussed with the patient and/or family. Pt and/or family were instructed on symptoms that may arise after discharge requiring re-evaluation by a physician. Pt and/or family have had the opportunity to ask questions about their care. Patient and/or family verbalized understanding and agreement with care plan, follow up and return instructions. Patient and/or family agree to return in 50 hours if their symptoms are not improving or immediately if they have any change in their condition. I have also put together some discharge instructions for patient that include: 1) educational information regarding their diagnosis, 2) how to care for their diagnosis at home, as well a 3) list of reasons why they would want to return to the ED prior to their follow-up appointment, should their condition change. Danielle Sheffield MD      Procedures      Disposition:  Discharged      DISCHARGE PLAN:  1. Current Discharge Medication List      START taking these medications    Details   metoprolol tartrate (LOPRESSOR) 25 mg tablet Take 0.5 Tablets by mouth two (2) times a day. Qty: 30 Tablet, Refills: 0  Start date: 7/2/2022           2. Follow-up Information     Follow up With Specialties Details Why Contact Info    Armand Chen NP Nurse Practitioner Schedule an appointment as soon as possible for a visit in 2 days For follow up [address] [facility]  [phone]          3. Return to ED if worse       Attestations:     Danielle Sheffield MD    Please note that this dictation was completed with [facility], the [employer] voice recognition software. Quite often unanticipated grammatical, syntax, homophones, and other interpretive errors are inadvertently transcribed by the computer software. Please disregard these errors. Please excuse any errors that have escaped final proofreading. Thank you.

## 2022-07-02 NOTE — ED TRIAGE NOTES
Patient states that she was having irregular rapid heart beats. High blood pressure.  Chest discomfort of 1/10

## 2022-07-04 LAB
ATRIAL RATE: 56 BPM
CALCULATED P AXIS, ECG09: 50 DEGREES
CALCULATED R AXIS, ECG10: 50 DEGREES
CALCULATED T AXIS, ECG11: 31 DEGREES
DIAGNOSIS, 93000: NORMAL
P-R INTERVAL, ECG05: 144 MS
Q-T INTERVAL, ECG07: 414 MS
QRS DURATION, ECG06: 88 MS
QTC CALCULATION (BEZET), ECG08: 399 MS
VENTRICULAR RATE, ECG03: 56 BPM

## 2022-07-05 NOTE — PROGRESS NOTES
Macie Brown is a 57 y.o. female is here for ED follow up. Hx IDDM with neuropathy, hypertension, dyslipidemia, thyroid disease, palpitations, MV prolapse, low back pain w/ sciatica, DJD hips followed by Sky Gotti NP with recent OV/labs. Initially seen by Dr Patti Rod in 2021: Moved here from [place], previously followed by PCP. Endocrine and Cardiology there. Recent foot pain/issues, prior stress fx and had Xrays which showed vascular calcifications. GIven rx for lovastatin (not taking), recent lipids as noted at target. Normal CBC, CMP, TSH. HbA1c 7.4. Occasional palpitations, had syncopal episode 2 weeks after receiving [employer]. Nausea/diaphoresis, weak/dizzy. Had Echo approx 2 yrs ago--reportedly ok. Went to ED on 2022 for palpitation and elevated bp. Also c/o anxiety. EMS reports that she was noted to have PVCs on the monitor on the way in. Negative cardiac work up. She was given lorazepam, discharged on low dose beta blocker. Today, not taking BB. Still having intermittent palpitation. EKG today showed NSR. The patient denies chest pain/ shortness of breath, orthopnea, PND, LE edema,  presyncope or fatigue.        Patient Active Problem List    Diagnosis Date Noted    Left knee pain 2022    Hypothyroidism due to Hashimoto's thyroiditis 2021    Palpitations 2021    Chronic right-sided low back pain with bilateral sciatica 2021    History of stress fracture 2021    Type 1 diabetes mellitus with diabetic neuropathy ([address] 10/18/2018    Essential hypertension 2015      David Hernandez NP  Past Medical History:   Diagnosis Date    Diabetes ([address]     H/O colonoscopy 2019    H/O mammogram 2021    Hypertension     Hypothyroidism due to Hashimoto's thyroiditis       Past Surgical History:   Procedure Laterality Date    HX CARPAL TUNNEL RELEASE      HX  SECTION      HX COLONOSCOPY 04/13/2019    recommended 5 year followup colonoscopy     Allergies   Allergen Reactions    Iodine Anaphylaxis    Sulfa (Sulfonamide Antibiotics) Angioedema      Family History   Problem Relation Age of Onset    Asthma Father     Diabetes Father     Heart Disease Father     Hypertension Father     Hypertension Sister     Diabetes Son       Social History     Socioeconomic History    Marital status:      Spouse name: Not on file    Number of children: Not on file    Years of education: Not on file    Highest education level: Not on file   Occupational History    Not on file   Tobacco Use    Smoking status: Never Smoker    Smokeless tobacco: Never Used   Vaping Use    Vaping Use: Never used   Substance and Sexual Activity    Alcohol use: Yes     Alcohol/week: 0.8 standard drinks     Types: 1 Glasses of wine per week    Drug use: Never    Sexual activity: Yes   Other Topics Concern     Service No    Blood Transfusions No    Caffeine Concern No    Occupational Exposure No    Hobby Hazards No    Sleep Concern No    Stress Concern No    Weight Concern No    Special Diet No    Back Care No    Exercise Yes    Bike Helmet No    Seat Belt Yes    Self-Exams Yes   Social History Narrative    Not on file     Social Determinants of Health     Financial Resource Strain:     Difficulty of Paying Living Expenses: Not on file   Food Insecurity:     Worried About Running Out of Food in the Last Year: Not on file    Wai of Food in the Last Year: Not on file   Transportation Needs:     Lack of Transportation (Medical): Not on file    Lack of Transportation (Non-Medical):  Not on file   Physical Activity:     Days of Exercise per Week: Not on file    Minutes of Exercise per Session: Not on file   Stress:     Feeling of Stress : Not on file   Social Connections:     Frequency of Communication with Friends and Family: Not on file    Frequency of Social Gatherings with Friends and Family: Not on file    Attends Orthodox Services: Not on file    Active Member of Clubs or Organizations: Not on file    Attends Club or Organization Meetings: Not on file    Marital Status: Not on file   Intimate Partner Violence:     Fear of Current or Ex-Partner: Not on file    Emotionally Abused: Not on file    Physically Abused: Not on file    Sexually Abused: Not on file   Housing Stability:     Unable to Pay for Housing in the Last Year: Not on file    Number of [place] in the Last Year: Not on file    Unstable Housing in the Last Year: Not on file      Current Outpatient Medications   Medication Sig    metoprolol tartrate (LOPRESSOR) 25 mg tablet Take 0.5 Tablets by mouth two (2) times a day.  LORazepam (Ativan) 0.5 mg tablet Take 1 Tablet by mouth every eight (8) hours as needed for Anxiety. Max Daily Amount: 1.5 mg.    lisinopriL (PRINIVIL, ZESTRIL) 10 mg tablet Take 1 pill daily with a 5mg daily for total daily dose 15mg    ALPHA LIPOIC ACID PO Take 1 Tablet by mouth daily.  turmeric (CURCUMIN) Take 1 Tablet by mouth daily.  glucagon 1 mg solr 1 mg by IntraMUSCular route as needed (low blood sugar).  insulin glargine (LANTUS,BASAGLAR) 100 unit/mL (3 mL) inpn Inject 18 units SQ every morning    Insulin Needles, Disposable, 31 gauge x 5/16\" ndle Inject daily with insulin up to 4 times daily    Blood-Glucose Meter monitoring kit Check sugar up to 5 times daily, DX: E10.40    glucose blood VI test strips (ASCENSIA AUTODISC VI, ONE TOUCH ULTRA TEST VI) strip Check sugar up to 5 times daily, DX: E10.40    lancets misc Check sugar up to 5 times daily, DX: E10.40    levothyroxine (SYNTHROID) 137 mcg tablet Take 1 pill every morning 6 days per week    cholecalciferol, vitamin D3, (Vitamin D3) 50 mcg (2,000 unit) tab Take 1 Tablet by mouth daily.  insulin lispro (HUMALOG) 100 unit/mL kwikpen Inject three times daily before meals according to sliding scale.  Max dose 10 units     No current facility-administered medications for this visit. Review of Symptoms:    CONST  No weight change. No fever, chills, sweats    ENT No visual changes, URI sx, sore throat    CV  See HPI   RESP  No cough, or sputum, wheezing. Also see HPI   GI  No abdominal pain or change in bowel habits. No heartburn or dysphagia. No melena or rectal bleeding.   No dysuria, urgency, frequency, hematuria   MSKEL  +hip, foot, low back pain   No muscle pain. SKIN  No rash or lesions. NEURO  No headache, syncope, or seizure. No weakness, loss of sensation, or paresthesias. PSYCH  No low mood or depression  No anxiety. HE/LYMPH  No easy bruising, abnormal bleeding, or enlarged glands. Physical ExamPhysical Exam:    There were no vitals taken for this visit. Gen: NAD  HEENT:  PERRL, throat clear  Neck: no adenopathy, no thyromegaly, no JVD   Heart:  Regular,Nl S1S2,  no murmur, gallop or rub. Lungs:  clear  Abdomen:   Soft, non-tender, bowel sounds are active.    Extremities:  No edema  Pulse: symmetric  Neuro: A&O times 3, No focal neuro deficits    Cardiographics    ECG: NSR, low voltage, otherwise normal      Labs:   Lab Results   Component Value Date/Time    Sodium 139 07/02/2022 12:30 AM    Sodium 131 (L) 03/11/2022 04:14 PM    Sodium 133 (L) 09/15/2021 11:45 AM    Potassium 3.9 07/02/2022 12:30 AM    Potassium 4.3 03/11/2022 04:14 PM    Potassium 4.3 09/15/2021 11:45 AM    Chloride 100 07/02/2022 12:30 AM    Chloride 97 03/11/2022 04:14 PM    Chloride 97 09/15/2021 11:45 AM    CO2 31 07/02/2022 12:30 AM    CO2 30 03/11/2022 04:14 PM    CO2 31 09/15/2021 11:45 AM    Anion gap 8 07/02/2022 12:30 AM    Anion gap 4 (L) 03/11/2022 04:14 PM    Anion gap 5 09/15/2021 11:45 AM    Glucose 143 (H) 07/02/2022 12:30 AM    Glucose 175 (H) 03/11/2022 04:14 PM    Glucose 138 (H) 09/15/2021 11:45 AM    BUN 13 07/02/2022 12:30 AM    BUN 17 03/11/2022 04:14 PM    BUN 11 09/15/2021 11:45 AM Creatinine 0.66 07/02/2022 12:30 AM    Creatinine 0.73 03/11/2022 04:14 PM    Creatinine 0.63 09/15/2021 11:45 AM    BUN/Creatinine ratio 20 07/02/2022 12:30 AM    BUN/Creatinine ratio 23 (H) 03/11/2022 04:14 PM    BUN/Creatinine ratio 17 09/15/2021 11:45 AM    GFR est AA >60 07/02/2022 12:30 AM    GFR est AA >60 03/11/2022 04:14 PM    GFR est AA >60 09/15/2021 11:45 AM    GFR est non-AA >60 07/02/2022 12:30 AM    GFR est non-AA >60 03/11/2022 04:14 PM    GFR est non-AA >60 09/15/2021 11:45 AM    Calcium 8.9 07/02/2022 12:30 AM    Calcium 9.0 03/11/2022 04:14 PM    Calcium 9.1 09/15/2021 11:45 AM    Bilirubin, total 0.3 07/02/2022 12:30 AM    Bilirubin, total 0.4 03/11/2022 04:14 PM    Bilirubin, total 0.5 09/15/2021 11:45 AM    Alk. phosphatase 85 07/02/2022 12:30 AM    Alk. phosphatase 73 03/11/2022 04:14 PM    Alk. phosphatase 72 09/15/2021 11:45 AM    Protein, total 7.1 07/02/2022 12:30 AM    Protein, total 6.8 03/11/2022 04:14 PM    Protein, total 7.2 09/15/2021 11:45 AM    Albumin 4.0 07/02/2022 12:30 AM    Albumin 3.9 03/11/2022 04:14 PM    Albumin 3.8 09/15/2021 11:45 AM    Globulin 3.1 07/02/2022 12:30 AM    Globulin 2.9 03/11/2022 04:14 PM    Globulin 3.4 09/15/2021 11:45 AM    A-G Ratio 1.3 07/02/2022 12:30 AM    A-G Ratio 1.3 03/11/2022 04:14 PM    A-G Ratio 1.1 09/15/2021 11:45 AM    ALT (SGPT) 34 07/02/2022 12:30 AM    ALT (SGPT) 32 03/11/2022 04:14 PM    ALT (SGPT) 28 09/15/2021 11:45 AM     No results found for: CPK, CPKX, CPX  Lab Results   Component Value Date/Time    Cholesterol, total 159 09/15/2021 11:45 AM    HDL Cholesterol 79 09/15/2021 11:45 AM    LDL, calculated 68.4 09/15/2021 11:45 AM    Triglyceride 58 09/15/2021 11:45 AM    CHOL/HDL Ratio 2.0 09/15/2021 11:45 AM     No results found for this or any previous visit.     Assessment:         Patient Active Problem List    Diagnosis Date Noted    Left knee pain 03/13/2022    Hypothyroidism due to Hashimoto's thyroiditis 09/01/2021    Palpitations 09/01/2021    Chronic right-sided low back pain with bilateral sciatica 09/01/2021    History of stress fracture 09/01/2021    Type 1 diabetes mellitus with diabetic neuropathy ([facility] 10/18/2018    Essential hypertension 04/22/2015      Hx IDDM with neuropathy, hypertension, dyslipidemia, thyroid disease, palpitations, MV prolapse, low back pain w/ sciatica, DJD hips followed by Luciano Clinton NP with recent OV/labs. Moved here from [place], previously followed by PCP. Endocrine and Cardiology there. Recent foot pain/issues, prior stress fx and had Xrays which showed vascular calcifications. GIven rx for lovastatin (not taking), recent lipids as noted at target. Normal CBC, CMP, TSH. HbA1c 7.4. Occasional palpitations, had syncopal episode 2 weeks after receiving [employer]. Nausea/diaphoresis, weak/dizzy. Had Echo approx 2 yrs ago--reportedly ok. Plan:     Intermittent palpitations  PVC (premature ventricular contraction)  elev tsh with normal T4 in 7/2022; CBC/CMP ok  Obtain 1 mos event, echo  Will start metoprolol 12.5 mg BID, evidently she has not started this post ED visit      Essential hypertension  Hold Lisinopril  As she starts metoprolol   The patient will monitor BP at home and call if generally >140/85.   Serum Cr 0.66 in 7/2022    Dyslipidemia  Prev given rx for lovastatin by PCP, not taking    Hx syncopal episode 2 weeks after receiving Pf[employer] vacc  Normal 2 week event  9/2021   Carotid duplex 9/2021 with minimal plaque build up  Normal stress EKG in 11/2021      Hx Claudication  Normal MYLA in 9/2021    Type 1 diabetes mellitus with diabetic neuropathy ([facility] [address] [facility]  Will establish care with endo      RTC in 3 mos    Link JENY Rojo

## 2022-07-06 ENCOUNTER — DOCUMENTATION ONLY (OUTPATIENT)
Dept: FAMILY MEDICINE CLINIC | Age: 62
End: 2022-07-06

## 2022-07-06 ENCOUNTER — OFFICE VISIT (OUTPATIENT)
Dept: CARDIOLOGY CLINIC | Age: 62
End: 2022-07-06
Payer: COMMERCIAL

## 2022-07-06 VITALS
WEIGHT: 150 LBS | SYSTOLIC BLOOD PRESSURE: 130 MMHG | DIASTOLIC BLOOD PRESSURE: 70 MMHG | OXYGEN SATURATION: 98 % | RESPIRATION RATE: 18 BRPM | BODY MASS INDEX: 24.11 KG/M2 | HEIGHT: 66 IN | TEMPERATURE: 96.8 F | HEART RATE: 67 BPM

## 2022-07-06 DIAGNOSIS — I49.3 PVC (PREMATURE VENTRICULAR CONTRACTION): ICD-10-CM

## 2022-07-06 DIAGNOSIS — R00.2 INTERMITTENT PALPITATIONS: Primary | ICD-10-CM

## 2022-07-06 DIAGNOSIS — I10 ESSENTIAL HYPERTENSION: ICD-10-CM

## 2022-07-06 DIAGNOSIS — R55 SYNCOPE, UNSPECIFIED SYNCOPE TYPE: ICD-10-CM

## 2022-07-06 DIAGNOSIS — E10.40 TYPE 1 DIABETES MELLITUS WITH DIABETIC NEUROPATHY (HCC): ICD-10-CM

## 2022-07-06 DIAGNOSIS — M79.605 PAIN IN BOTH LOWER EXTREMITIES: ICD-10-CM

## 2022-07-06 DIAGNOSIS — E78.5 DYSLIPIDEMIA: ICD-10-CM

## 2022-07-06 DIAGNOSIS — M79.604 PAIN IN BOTH LOWER EXTREMITIES: ICD-10-CM

## 2022-07-06 PROCEDURE — 93000 ELECTROCARDIOGRAM COMPLETE: CPT | Performed by: NURSE PRACTITIONER

## 2022-07-06 PROCEDURE — 3051F HG A1C>EQUAL 7.0%<8.0%: CPT | Performed by: NURSE PRACTITIONER

## 2022-07-06 PROCEDURE — 99214 OFFICE O/P EST MOD 30 MIN: CPT | Performed by: NURSE PRACTITIONER

## 2022-07-06 NOTE — PROGRESS NOTES
Identified pt with two pt identifiers(name and ). Reviewed record in preparation for visit and have obtained necessary documentation. Chief Complaint   Patient presents with    Hypertension     hospital follow up    Palpitations      Vitals:    22 1446   BP: 130/70   Pulse: 67   Resp: 18   Temp: 96.8 °F (36 °C)   TempSrc: Temporal   SpO2: 98%   Weight: 150 lb (68 kg)   Height: 5' 6\" (1.676 m)   PainSc:   0 - No pain       Medications reviewed/approved by provider. Health Maintenance Review: Patient reminded of \"due or due soon\" health maintenance. I have asked the patient to contact his/her primary care provider (PCP) for follow-up on his/her health maintenance. Coordination of Care Questionnaire:  :   1) Have you been to an emergency room, urgent care, or hospitalized since your last visit? If yes, where when, and reason for visit? yes [facility] July 3 2022 high bp and pvc's      2. Have seen or consulted any other health care provider since your last visit? If yes, where when, and reason for visit? NO      Patient is accompanied by self I have received verbal consent from Satinder Jacobo to discuss any/all medical information while they are present in the room.

## 2022-07-06 NOTE — PROGRESS NOTES
Faxed referrals,demo sheet,mri & ov note to ortho.     Also faxed [facility] referral forms to ortho & Cardiology

## 2022-07-07 ENCOUNTER — TELEPHONE (OUTPATIENT)
Dept: FAMILY MEDICINE CLINIC | Age: 62
End: 2022-07-07

## 2022-07-07 DIAGNOSIS — R00.2 PALPITATIONS: Primary | ICD-10-CM

## 2022-07-07 NOTE — TELEPHONE ENCOUNTER
Pt went to see Johnny Mckeon (cardiology) yesterday. They are needing a referral put in the system.  Pt was seen for palpitations

## 2022-07-08 ENCOUNTER — OFFICE VISIT (OUTPATIENT)
Dept: FAMILY MEDICINE CLINIC | Age: 62
End: 2022-07-08
Payer: COMMERCIAL

## 2022-07-08 ENCOUNTER — CLINICAL SUPPORT (OUTPATIENT)
Dept: CARDIOLOGY CLINIC | Age: 62
End: 2022-07-08
Payer: COMMERCIAL

## 2022-07-08 ENCOUNTER — TELEPHONE (OUTPATIENT)
Dept: CARDIOLOGY CLINIC | Age: 62
End: 2022-07-08

## 2022-07-08 VITALS
OXYGEN SATURATION: 98 % | BODY MASS INDEX: 23.87 KG/M2 | SYSTOLIC BLOOD PRESSURE: 120 MMHG | WEIGHT: 148.5 LBS | RESPIRATION RATE: 18 BRPM | HEIGHT: 66 IN | DIASTOLIC BLOOD PRESSURE: 70 MMHG | HEART RATE: 58 BPM | TEMPERATURE: 97.8 F

## 2022-07-08 DIAGNOSIS — M54.42 CHRONIC RIGHT-SIDED LOW BACK PAIN WITH BILATERAL SCIATICA: ICD-10-CM

## 2022-07-08 DIAGNOSIS — K58.0 IRRITABLE BOWEL SYNDROME WITH DIARRHEA: ICD-10-CM

## 2022-07-08 DIAGNOSIS — M25.552 HIP PAIN, BILATERAL: ICD-10-CM

## 2022-07-08 DIAGNOSIS — M62.830 BACK MUSCLE SPASM: Primary | ICD-10-CM

## 2022-07-08 DIAGNOSIS — F43.0 ANXIETY AS ACUTE REACTION TO EXCEPTIONAL STRESS: ICD-10-CM

## 2022-07-08 DIAGNOSIS — E10.40 TYPE 1 DIABETES MELLITUS WITH DIABETIC NEUROPATHY (HCC): Chronic | ICD-10-CM

## 2022-07-08 DIAGNOSIS — M25.562 LEFT KNEE PAIN, UNSPECIFIED CHRONICITY: ICD-10-CM

## 2022-07-08 DIAGNOSIS — F41.1 ANXIETY AS ACUTE REACTION TO EXCEPTIONAL STRESS: ICD-10-CM

## 2022-07-08 DIAGNOSIS — E03.8 HYPOTHYROIDISM DUE TO HASHIMOTO'S THYROIDITIS: Chronic | ICD-10-CM

## 2022-07-08 DIAGNOSIS — I10 ESSENTIAL HYPERTENSION: ICD-10-CM

## 2022-07-08 DIAGNOSIS — M25.551 HIP PAIN, BILATERAL: ICD-10-CM

## 2022-07-08 DIAGNOSIS — I49.8 OTHER CARDIAC ARRHYTHMIA: ICD-10-CM

## 2022-07-08 DIAGNOSIS — R00.2 PALPITATION: Primary | ICD-10-CM

## 2022-07-08 DIAGNOSIS — M54.41 CHRONIC RIGHT-SIDED LOW BACK PAIN WITH BILATERAL SCIATICA: ICD-10-CM

## 2022-07-08 DIAGNOSIS — G89.29 CHRONIC RIGHT-SIDED LOW BACK PAIN WITH BILATERAL SCIATICA: ICD-10-CM

## 2022-07-08 DIAGNOSIS — E06.3 HYPOTHYROIDISM DUE TO HASHIMOTO'S THYROIDITIS: Chronic | ICD-10-CM

## 2022-07-08 PROBLEM — I49.9 ARRHYTHMIA: Status: ACTIVE | Noted: 2022-07-08

## 2022-07-08 PROCEDURE — 93272 ECG/REVIEW INTERPRET ONLY: CPT | Performed by: INTERNAL MEDICINE

## 2022-07-08 PROCEDURE — 3051F HG A1C>EQUAL 7.0%<8.0%: CPT | Performed by: FAMILY MEDICINE

## 2022-07-08 PROCEDURE — 99214 OFFICE O/P EST MOD 30 MIN: CPT | Performed by: FAMILY MEDICINE

## 2022-07-08 RX ORDER — DICYCLOMINE HYDROCHLORIDE 10 MG/1
10 CAPSULE ORAL 3 TIMES DAILY
Qty: 270 CAPSULE | Refills: 1 | Status: SHIPPED | OUTPATIENT
Start: 2022-07-08 | End: 2022-08-17

## 2022-07-08 RX ORDER — LEVOTHYROXINE SODIUM 137 UG/1
TABLET ORAL
Qty: 90 TABLET | Refills: 0
Start: 2022-07-08

## 2022-07-08 RX ORDER — BUSPIRONE HYDROCHLORIDE 10 MG/1
10 TABLET ORAL 3 TIMES DAILY
Qty: 270 TABLET | Refills: 1 | Status: SHIPPED | OUTPATIENT
Start: 2022-07-08 | End: 2022-08-17

## 2022-07-08 RX ORDER — LISINOPRIL 2.5 MG/1
2.5 TABLET ORAL DAILY
Qty: 90 TABLET | Refills: 1 | Status: SHIPPED | OUTPATIENT
Start: 2022-07-08 | End: 2022-08-10

## 2022-07-08 RX ORDER — VERAPAMIL HYDROCHLORIDE 40 MG/1
40 TABLET ORAL 2 TIMES DAILY
Qty: 180 TABLET | Refills: 1 | Status: SHIPPED | OUTPATIENT
Start: 2022-07-08 | End: 2022-07-26 | Stop reason: SDUPTHER

## 2022-07-08 NOTE — PROGRESS NOTES
OFFICE  hook up  30 day EVENT monitor only. Verified patient with two patient identifiers. Patient verbalized understanding of its use. Ordering Provider: Rae Villagomez MD  Reason: Intermittent palpitations [R00.2 (ICD-10-CM)]; PVC (premature ventricular contraction) [I49.3 (ICD-10-CM)]; Essential hypertension [I10 (ICD-10-CM)]; Dyslipidemia [E78.5 (ICD-10-CM)]; Type 1 diabetes mellitus with diabetic neuropathy (HCC) [E10.40 (ICD-10-CM)]; Pain in both lower extremities [M79.604, M79.605 (ICD-10-CM)]; Syncope, unspecified syncope type [R55 (ICD-10-CM)]      [facility] No LOS.

## 2022-07-08 NOTE — TELEPHONE ENCOUNTER
PER WILLIAM NP:  Lilia Iyer., NP  Laure Bowman LPN  Caller: Unspecified (Today, 10:59 AM)  Stop BB---add to allergy pls     Start diltiazem 120 mg daily.  Tell her she might need to restart her lisinopril as diltiazem doesn't have good coverage for bp control     CLARIFIED THIS INFO W/ PROVIDER after this nurse noticed pcp prescribed verapamil today. Rediscussed w/ NP. Per NP, \"Take verapamil per pcp and not diltiazem as they are both ccb. \"    Pt will take verapamil rx per pcp and continue with event monitor.

## 2022-07-08 NOTE — PROGRESS NOTES
Morgan Oliver is a 57 y.o. female who presents with the following:  Chief Complaint   Patient presents with   [facility] Follow Up    Hypertension    Diabetes     saw cardiology 2 days ago, and today got put on monitor X 1 month    Chest Pain    GERD    Back Pain    Hip Pain       The patient comes in in follow-up after an ER visit about 6 days ago after which she was set up to see cardiology and she was still complaining of palpitations although neither of the 2 EKGs that were done prior to her visit here showed any arrhythmia. Because of the complaint of palpitations the patient was started on a low-dose of metoprolol twice daily which she claims is giving her diarrhea and abdominal cramping. It turns out the patient has a long history of irritable bowel syndrome with diarrhea. The patient saw cardiology 2 days ago and was placed on a monitor for a month but was evidently back over to cardiology today because they were complaining that I had placed her on verapamil after I had stopped the metoprolol that they had started which the patient was complaining was causing side effects. The patient is having chest discomfort but not chest pain and most of the discomfort is coming from palpitations that she feels from time to time. The patient also admits to having some heartburn from reflux. Patient is also complaining of low back pain and hip pain which is basically in the gluteals bilaterally and in the area of the piriformis bilaterally which she states has been helped by physical therapy in the past.      Allergies   Allergen Reactions    Iodine Anaphylaxis    Beta-Blockers (Beta-Adrenergic Blocking Agts) Diarrhea     METOPROLOL TARTRATE  DIARRHEA AFTER 3 DOSES.     Sulfa (Sulfonamide Antibiotics) Angioedema       Current Outpatient Medications   Medication Sig    levothyroxine (SYNTHROID) 137 mcg tablet Take 1 pill every morning 7 days per week    lisinopriL (PRINIVIL, ZESTRIL) 2.5 mg tablet Take 1 Tablet by mouth daily.  verapamiL (CALAN) 40 mg tablet Take 1 Tablet by mouth two (2) times a day.  busPIRone (BUSPAR) 10 mg tablet Take 1 Tablet by mouth three (3) times daily.  dicyclomine (BENTYL) 10 mg capsule Take 1 Capsule by mouth three (3) times daily. Indications: irritable colon    LORazepam (Ativan) 0.5 mg tablet Take 1 Tablet by mouth every eight (8) hours as needed for Anxiety. Max Daily Amount: 1.5 mg.    ALPHA LIPOIC ACID PO Take 2 Tablets by mouth daily.  turmeric (CURCUMIN) Take 2 Tablets by mouth daily.  glucagon 1 mg solr 1 mg by IntraMUSCular route as needed (low blood sugar).  insulin glargine (LANTUS,BASAGLAR) 100 unit/mL (3 mL) inpn Inject 18 units SQ every morning    Insulin Needles, Disposable, 31 gauge x 5/16\" ndle Inject daily with insulin up to 4 times daily    Blood-Glucose Meter monitoring kit Check sugar up to 5 times daily, DX: E10.40    glucose blood VI test strips (ASCENSIA AUTODISC VI, ONE TOUCH ULTRA TEST VI) strip Check sugar up to 5 times daily, DX: E10.40    lancets misc Check sugar up to 5 times daily, DX: E10.40    cholecalciferol, vitamin D3, (Vitamin D3) 50 mcg (2,000 unit) tab Take 1 Tablet by mouth daily.  insulin lispro (HUMALOG) 100 unit/mL kwikpen Inject three times daily before meals according to sliding scale. Max dose 10 units     No current facility-administered medications for this visit.        Past Medical History:   Diagnosis Date    Diabetes ([address]     H/O colonoscopy 2019    H/O mammogram 2021    Hypertension     Hypothyroidism due to Hashimoto's thyroiditis        Past Surgical History:   Procedure Laterality Date    HX CARPAL TUNNEL RELEASE      HX  SECTION      HX COLONOSCOPY  2019    recommended 5 year followup colonoscopy       Family History   Problem Relation Age of Onset    Asthma Father     Diabetes Father     Heart Disease Father     Hypertension Father     Hypertension Sister     Diabetes Son        Social History     Socioeconomic History    Marital status:    Tobacco Use    Smoking status: Never Smoker    Smokeless tobacco: Never Used   Vaping Use    Vaping Use: Never used   Substance and Sexual Activity    Alcohol use: Yes     Alcohol/week: 0.8 standard drinks     Types: 1 Glasses of wine per week    Drug use: Never    Sexual activity: Yes   Other Topics Concern     Service No    Blood Transfusions No    Caffeine Concern No    Occupational Exposure No    Hobby Hazards No    Sleep Concern No    Stress Concern No    Weight Concern No    Special Diet No    Back Care No    Exercise Yes    Bike Helmet No    Seat Belt Yes    Self-Exams Yes       Review of Systems   Constitutional: Negative for chills, fever, malaise/fatigue and weight loss. HENT: Negative for congestion, hearing loss, sore throat and tinnitus. Eyes: Negative for blurred vision, pain and discharge. Respiratory: Negative for cough, shortness of breath and wheezing. Cardiovascular: Positive for palpitations. Negative for chest pain, orthopnea, claudication and leg swelling. Gastrointestinal: Positive for abdominal pain, diarrhea and heartburn. Negative for blood in stool, constipation, melena, nausea and vomiting. Genitourinary: Negative for dysuria, frequency and urgency. Musculoskeletal: Positive for back pain and joint pain (Mostly in the hips but also in the right shoulder). Negative for falls and myalgias. Skin: Negative for itching and rash. Neurological: Negative for dizziness, tingling, tremors, sensory change, speech change, focal weakness, seizures, loss of consciousness, weakness and headaches. Endo/Heme/Allergies: Negative for environmental allergies and polydipsia. Does not bruise/bleed easily. Psychiatric/Behavioral: Negative for depression and substance abuse. The patient is nervous/anxious.          I have learned that the patient has been threatening to the front office staff and is being transferred to another practice. Visit Vitals  /70   Pulse (!) 58   Temp 97.8 °F (36.6 °C) (Temporal)   Resp 18   Ht 5' 6\" (1.676 m)   Wt 148 lb 8 oz (67.4 kg)   SpO2 98%   BMI 23.97 kg/m²     Physical Exam  Vitals reviewed. Constitutional:       Appearance: Normal appearance. HENT:      Head: Normocephalic and atraumatic. Right Ear: Tympanic membrane, ear canal and external ear normal.      Left Ear: Tympanic membrane, ear canal and external ear normal.      Nose: Nose normal. No congestion or rhinorrhea. Mouth/Throat:      Mouth: Mucous membranes are moist.      Pharynx: No posterior oropharyngeal erythema. Eyes:      General:         Right eye: No discharge. Left eye: No discharge. Extraocular Movements: Extraocular movements intact. Conjunctiva/sclera: Conjunctivae normal.      Pupils: Pupils are equal, round, and reactive to light. Comments: Cornea anterior chamber and iris are normal.   Neck:      Trachea: No tracheal deviation. Cardiovascular:      Rate and Rhythm: Normal rate. Rhythm irregular. Pulses: Normal pulses. Heart sounds: Normal heart sounds. No murmur heard. No friction rub. No gallop. Comments: On auscultation the patient had irregularly irregular rhythm sounding like trigeminy which was not verified on 2 previous EKGs and she is evidently having a Holter monitor or an event recorder which she did not inform me about. Pulmonary:      Effort: Pulmonary effort is normal. No respiratory distress. Breath sounds: Normal breath sounds. No wheezing or rhonchi. Chest:      Chest wall: No tenderness. Abdominal:      General: Bowel sounds are normal. There is no distension. Palpations: Abdomen is soft. There is no mass. Tenderness: There is no abdominal tenderness. There is no guarding or rebound.    Musculoskeletal:         General: Tenderness (In the low back adjacent to the SIJ's bilaterally and in the area of the piriformis muscle bilaterally.) present. No swelling, deformity or signs of injury. Cervical back: Normal range of motion and neck supple. Right lower leg: No edema. Left lower leg: No edema. Lymphadenopathy:      Cervical: No cervical adenopathy. Skin:     General: Skin is warm and dry. Coloration: Skin is not pale. Findings: No erythema or rash. Neurological:      General: No focal deficit present. Mental Status: She is alert and oriented to person, place, and time. Cranial Nerves: No cranial nerve deficit. Motor: No abnormal muscle tone. Deep Tendon Reflexes: Reflexes are normal and symmetric. Reflexes normal.      Comments: Cranial nerves II through XII are intact sensory and motor. Biceps triceps knee and ankle DTRs are normal and symmetrical.   Psychiatric:         Behavior: Behavior normal.         Thought Content: Thought content normal.         Judgment: Judgment normal.      Comments: The patient was exceedingly anxious about every detail. ICD-10-CM ICD-9-CM    1. Back muscle spasm  M62.830 724.8 REFERRAL TO PHYSICAL THERAPY   2. Hip pain, bilateral  M25.551 719.45 REFERRAL TO PHYSICAL THERAPY    M25.552     3. Essential hypertension  I10 401.9 verapamiL (CALAN) 40 mg tablet   4. Hypothyroidism due to Hashimoto's thyroiditis  E03.8 244.8 levothyroxine (SYNTHROID) 137 mcg tablet    E06.3 245.2    5. Type 1 diabetes mellitus with diabetic neuropathy (HCC)  E10.40 250.61 lisinopriL (PRINIVIL, ZESTRIL) 2.5 mg tablet     357.2    6. Chronic right-sided low back pain with bilateral sciatica  M54.41 724.3     M54.42 724.2     G89.29 338.29    7. Left knee pain, unspecified chronicity  M25.562 719.46    8. Other cardiac arrhythmia  I49.8 427.89 verapamiL (CALAN) 40 mg tablet   9. Irritable bowel syndrome with diarrhea  K58.0 564.1 dicyclomine (BENTYL) 10 mg capsule   10.  Anxiety as acute reaction to exceptional stress  F41.1 308.0 busPIRone (BUSPAR) 10 mg tablet    F43.0     45 minutes of this 60-minute visit were spent in counseling. Orders Placed This Encounter    REFERRAL TO PHYSICAL THERAPY     Referral Priority:   Routine     Referral Type:   PT/OT/ST     Referral Reason:   Specialty Services Required     Referred to Provider:   Lillian Davis PT     Number of Visits Requested:   1    levothyroxine (SYNTHROID) 137 mcg tablet     Sig: Take 1 pill every morning 7 days per week     Dispense:  90 Tablet     Refill:  0    lisinopriL (PRINIVIL, ZESTRIL) 2.5 mg tablet     Sig: Take 1 Tablet by mouth daily. Dispense:  90 Tablet     Refill:  1    verapamiL (CALAN) 40 mg tablet     Sig: Take 1 Tablet by mouth two (2) times a day. Dispense:  180 Tablet     Refill:  1    busPIRone (BUSPAR) 10 mg tablet     Sig: Take 1 Tablet by mouth three (3) times daily. Dispense:  270 Tablet     Refill:  1    dicyclomine (BENTYL) 10 mg capsule     Sig: Take 1 Capsule by mouth three (3) times daily. Indications: irritable colon     Dispense:  270 Capsule     Refill:  1   My understanding is the patient is being transferred to another practice because of her threatening speech to our front office staff.         Andi Shaw MD

## 2022-07-08 NOTE — TELEPHONE ENCOUNTER
Patient was in this morning at 10am for a event monitor application. Verified patient with two patient identifiers. Patient states, \"I cannot tolerate this metoprolol. It's causing me to feel terrible. \"     Asked pt to explain her sx's. Pt reports, \"abdominal cramping and diarrhea after taking 3 pills. \"    Pt requesting a substitute because she is worried about the weekend with palps and blood pressure. Seeing pcp this morning. Vitals:   /75 Important   (BP 1 Location: Left arm, BP Patient Position: Sitting)   Pulse 58 Important      Advised md and Np are out of the office but I will send to NP for review later this afternoon. Asked that pt bring up her concerns with pcp too.

## 2022-07-08 NOTE — PROGRESS NOTES
1. \"Have you been to the ER, urgent care clinic since your last visit? Hospitalized since your last visit? \" No    2. \"Have you seen or consulted any other health care providers outside of the [address] [place] [address] since your last visit? \" No     3. For patients aged 39-70: Has the patient had a colonoscopy / FIT/ Cologuard? No care gap present. If the patient is female:    4. For patients aged 41-77: Has the patient had a mammogram within the past 2 years? No care gap present      5. For patients aged 21-65: Has the patient had a pap smear?  Yes - no Care Gap present      Chief Complaint   Patient presents with   [facility] Follow Up    Hypertension    Diabetes     saw cardiology 2 days ago, and today got put on monitor X 1 month    Chest Pain    GERD     Visit Vitals  BP (!) 149/75 (BP 1 Location: Left arm, BP Patient Position: Sitting)   Pulse (!) 58   Temp 97.8 °F (36.6 °C) (Temporal)   Resp 18   Ht 5' 6\" (1.676 m)   Wt 148 lb 8 oz (67.4 kg)   SpO2 98%   BMI 23.97 kg/m²

## 2022-07-11 ENCOUNTER — TELEPHONE (OUTPATIENT)
Dept: CARDIOLOGY CLINIC | Age: 62
End: 2022-07-11

## 2022-07-11 NOTE — TELEPHONE ENCOUNTER
Patient wearing 30 day loopp monitor and is going to the beach this coming weekend and would like to know will there be enough data for her to discontinue wearing monitor before she leaves for her vacation, please advise        Patient aslo having a low pulse,          [phone]

## 2022-07-11 NOTE — TELEPHONE ENCOUNTER
3 uploads are available for review in [employer] portal after application on 7/8. Sinus Rhythm with PVCs  Simus Rhythm w/ bradycardia (HR 55) - pt recorded this event (sx: dizzy)  Sinus Rhythm with Ventricular Bigeminy (HR 90)  Sinus Rhythm with Ventricular Trigeminy (HR 66)  Sinus Tachycardia with PVC(s) ()   Sinus Rhythm with Ventricular Bigeminy (HR 97)    Pt took last dose of metoprolol on 7/7 (only took total of 1.5 pills) Stopped taking due to diarrhea. Pt taking verapamil 40 mg bid now (per pcp)      Trip departure date: 7/16/22    Wants the office to fill out paperwork for her trip if she cancels to be reimbursed.  Going to the beach that is 1 hour away from hospital.

## 2022-07-13 NOTE — TELEPHONE ENCOUNTER
Pt was informed via mychart that we do not have enough information from the event to direct her to cancel her upcoming beach trip. Pt can cancel or go at her own discretion per the provider, Salena Little NP (see below). JENY Merino, LPN  Caller: Unspecified (2 days ago,  2:56 PM)  \"We cant fill out any paper work to reimburse her for her trip. Eliud Bowers can cancel or go at her own discretion. \"

## 2022-07-15 ENCOUNTER — TELEPHONE (OUTPATIENT)
Dept: FAMILY MEDICINE CLINIC | Age: 62
End: 2022-07-15

## 2022-07-15 NOTE — TELEPHONE ENCOUNTER
Patient called regarding her neck pain. She stated she's been taking aleve and it helps a little. She said it raises her bp. Patient has some moloxicam and wants to know if it's ok for her to take this with all the other meds she's already on. Also wants to know if she can take a muscle relaxer.  Patient stated you can reply to her via [employer]

## 2022-07-25 ENCOUNTER — HOSPITAL ENCOUNTER (OUTPATIENT)
Dept: ULTRASOUND IMAGING | Age: 62
Discharge: HOME | End: 2022-07-25
Attending: NURSE PRACTITIONER
Payer: COMMERCIAL

## 2022-07-25 DIAGNOSIS — E78.5 DYSLIPIDEMIA: ICD-10-CM

## 2022-07-25 DIAGNOSIS — E10.40 TYPE 1 DIABETES MELLITUS WITH DIABETIC NEUROPATHY (HCC): ICD-10-CM

## 2022-07-25 DIAGNOSIS — I49.3 PVC (PREMATURE VENTRICULAR CONTRACTION): ICD-10-CM

## 2022-07-25 DIAGNOSIS — I10 ESSENTIAL HYPERTENSION: ICD-10-CM

## 2022-07-25 DIAGNOSIS — R00.2 INTERMITTENT PALPITATIONS: ICD-10-CM

## 2022-07-25 PROCEDURE — 93306 TTE W/DOPPLER COMPLETE: CPT

## 2022-07-26 ENCOUNTER — TELEPHONE (OUTPATIENT)
Dept: CARDIOLOGY CLINIC | Age: 62
End: 2022-07-26

## 2022-07-26 ENCOUNTER — OFFICE VISIT (OUTPATIENT)
Dept: FAMILY MEDICINE CLINIC | Age: 62
End: 2022-07-26
Payer: COMMERCIAL

## 2022-07-26 VITALS
RESPIRATION RATE: 20 BRPM | SYSTOLIC BLOOD PRESSURE: 140 MMHG | OXYGEN SATURATION: 99 % | TEMPERATURE: 97.1 F | HEART RATE: 66 BPM | DIASTOLIC BLOOD PRESSURE: 70 MMHG | WEIGHT: 152.2 LBS | BODY MASS INDEX: 24.46 KG/M2 | HEIGHT: 66 IN

## 2022-07-26 DIAGNOSIS — I10 ESSENTIAL HYPERTENSION: ICD-10-CM

## 2022-07-26 DIAGNOSIS — R00.2 PALPITATIONS: ICD-10-CM

## 2022-07-26 PROCEDURE — 99214 OFFICE O/P EST MOD 30 MIN: CPT

## 2022-07-26 RX ORDER — VERAPAMIL HYDROCHLORIDE 40 MG/1
40 TABLET ORAL 3 TIMES DAILY
Qty: 270 TABLET | Refills: 0 | Status: SHIPPED | OUTPATIENT
Start: 2022-07-26 | End: 2022-08-03 | Stop reason: ALTCHOICE

## 2022-07-26 NOTE — PROGRESS NOTES
Chief Complaint   Patient presents with    Elevated Blood Pressure       HPI:     is a 57 y.o. female who presents for an acute visit. She endorses elevated blood pressure at home, 164/90; she notes that this reading was taken prior to taking her BP medication. PMH includes T1DM, HTN, Hashimoto's thyroiditis; recently, she has been plagued with palpitations for which she has been evaluated in the ER at [facility] and in follow-up with cardiology--she is currently wearing a 30-day loop monitor. Recent EKGs have failed to capture any arrhythmia or ectopic beats; a single PVC was detected on cardiac monitor during her ER visit. She was initially placed on metoprolol for the palpitations, but this cause GI SEs; she has done well on verapamil 40mg BID. She notes the verapamil helps control the sensation of palpitations, but seems to wear off after 4-5 hours. She has been taking her BP several times a day and is concerned that it seems to go up and down throughout the day--usually on the higher side. She denies CP, SOB, OVIEDO, swelling in her extremities, headache, dizziness, syncope. Allergies   Allergen Reactions    Iodine Anaphylaxis    Beta-Blockers (Beta-Adrenergic Blocking Agts) Diarrhea     METOPROLOL TARTRATE  DIARRHEA AFTER 3 DOSES. Mold Unknown (comments)    Ragweed Pollen Unknown (comments)    Sulfa (Sulfonamide Antibiotics) Angioedema       Current Outpatient Medications   Medication Sig    verapamiL (CALAN) 40 mg tablet Take 1 Tablet by mouth three (3) times daily. levothyroxine (SYNTHROID) 137 mcg tablet Take 1 pill every morning 7 days per week    lisinopriL (PRINIVIL, ZESTRIL) 2.5 mg tablet Take 1 Tablet by mouth daily. LORazepam (Ativan) 0.5 mg tablet Take 1 Tablet by mouth every eight (8) hours as needed for Anxiety. Max Daily Amount: 1.5 mg. ALPHA LIPOIC ACID PO Take 2 Tablets by mouth daily. turmeric (CURCUMIN) Take 2 Tablets by mouth daily.     glucagon 1 mg solr 1 mg by IntraMUSCular route as needed (low blood sugar). insulin glargine (LANTUS,BASAGLAR) 100 unit/mL (3 mL) inpn Inject 18 units SQ every morning    Insulin Needles, Disposable, 31 gauge x 5/16\" ndle Inject daily with insulin up to 4 times daily    Blood-Glucose Meter monitoring kit Check sugar up to 5 times daily, DX: E10.40    glucose blood VI test strips (ASCENSIA AUTODISC VI, ONE TOUCH ULTRA TEST VI) strip Check sugar up to 5 times daily, DX: E10.40    lancets misc Check sugar up to 5 times daily, DX: E10.40    insulin lispro (HUMALOG) 100 unit/mL kwikpen Inject three times daily before meals according to sliding scale. Max dose 10 units    busPIRone (BUSPAR) 10 mg tablet Take 1 Tablet by mouth three (3) times daily. (Patient not taking: Reported on 7/26/2022)    dicyclomine (BENTYL) 10 mg capsule Take 1 Capsule by mouth three (3) times daily. Indications: irritable colon (Patient not taking: Reported on 7/26/2022)    cholecalciferol, vitamin D3, (Vitamin D3) 50 mcg (2,000 unit) tab Take 1 Tablet by mouth daily. No current facility-administered medications for this visit. Past Medical History:   Diagnosis Date    Diabetes ([address]     H/O colonoscopy 04/13/2019    H/O mammogram 02/08/2021    Hypertension     Hypothyroidism due to Hashimoto's thyroiditis 1999       Family History   Problem Relation Age of Onset    Asthma Father     Diabetes Father     Heart Disease Father     Hypertension Father     Hypertension Sister     Diabetes Son        Review of Systems   Constitutional: Negative. Negative for chills, fever and malaise/fatigue. HENT: Negative. Eyes: Negative. Respiratory: Negative. Negative for cough and shortness of breath. Cardiovascular:  Positive for palpitations. Negative for chest pain and leg swelling. Gastrointestinal: Negative. Negative for abdominal pain, nausea and vomiting. Genitourinary: Negative. Musculoskeletal: Negative. Skin: Negative.     Neurological: Negative. Negative for dizziness and headaches. Endo/Heme/Allergies: Negative. Psychiatric/Behavioral:  Negative for depression. The patient is nervous/anxious. BP (!) 140/70 (BP 1 Location: Left arm, BP Patient Position: Sitting, BP Cuff Size: Adult)   Pulse 66   Temp 97.1 °F (36.2 °C) (Core)   Resp 20   Ht 5' 6\" (1.676 m)   Wt 152 lb 3.2 oz (69 kg)   SpO2 99%   BMI 24.57 kg/m²     Wt Readings from Last 3 Encounters:   07/26/22 152 lb 3.2 oz (69 kg)   07/08/22 148 lb 8 oz (67.4 kg)   07/06/22 150 lb (68 kg)       3 most recent PHQ Screens 7/8/2022   Little interest or pleasure in doing things Not at all   Feeling down, depressed, irritable, or hopeless Not at all   Total Score PHQ 2 0       Physical Exam  Constitutional:       Appearance: Normal appearance. She is normal weight. HENT:      Head: Normocephalic and atraumatic. Eyes:      Extraocular Movements: Extraocular movements intact. Conjunctiva/sclera: Conjunctivae normal.      Pupils: Pupils are equal, round, and reactive to light. Neck:      Vascular: No carotid bruit. Cardiovascular:      Rate and Rhythm: Normal rate and regular rhythm. Pulses: Normal pulses. Heart sounds: Normal heart sounds. No murmur heard. No friction rub. No gallop. Pulmonary:      Effort: Pulmonary effort is normal.      Breath sounds: Normal breath sounds. No wheezing, rhonchi or rales. Musculoskeletal:      Cervical back: Normal range of motion and neck supple. No tenderness. Lymphadenopathy:      Cervical: No cervical adenopathy. Skin:     General: Skin is warm and dry. Neurological:      General: No focal deficit present. Mental Status: She is alert and oriented to person, place, and time. Psychiatric:         Mood and Affect: Mood normal.         Behavior: Behavior normal.         Thought Content: Thought content normal.         Judgment: Judgment normal.       ASSESSMENT AND PLAN:       ICD-10-CM ICD-9-CM    1. Essential hypertension  I10 401.9 verapamiL (CALAN) 40 mg tablet      2. Palpitations  R00.2 785.1 verapamiL (CALAN) 40 mg tablet          BP just at goal in office today; home BP monitor does not correlate--recommend she get new machine. BP lability and recurrence of palpitations likely related to Verapamil's short half-life; increase from BID to TID, hold dose for BP <110/60 accompanied by dizziness, fatigue, or other symptoms of hypotension. Recommend she check BP once daily one hour after medication administration, avoid taking multiple times daily. Medication Side Effects and Warnings were discussed with patient:  yes  Patient Labs were reviewed:  yes  Patient Past Records were reviewed:  yes      Patient aware of plan of care and verbalized understanding. Questions answered. RTC 4 weeks or sooner if needed. On this date 07/26/2022 I have spent 30 minutes reviewing previous notes, test results and face to face with the patient discussing the diagnosis and importance of compliance with the treatment plan as well as documenting on the day of the visit.     Dave Vizcarra, JENY

## 2022-07-26 NOTE — PROGRESS NOTES
1. \"Have you been to the ER, urgent care clinic since your last visit? No Hospitalized since your last visit? \" No    2. \"Have you seen or consulted any other health care providers outside of the [address] [place] [address] since your last visit? \" No     3. For patients aged 39-70: Has the patient had a colonoscopy / FIT/ Cologuard? Yes - no Care Gap present      If the patient is female:    4. For patients aged 41-77: Has the patient had a mammogram within the past 2 years? Yes - no Care Gap present      5. For patients aged 21-65: Has the patient had a pap smear?  Yes - no Care Gap present

## 2022-07-26 NOTE — TELEPHONE ENCOUNTER
Pt stated her bp medication may need to be increased, pt stated she is taking varapamil 40mg and lisimpril 2.5mg,pt stated her bp is still elevated, bp  this morning was 160/94 when she woke up, please advise    Bp will go down throughout the course of the day but it will start to go back up gradually, still siddhartha when it does go down, also having heart arhythmias.          Pt #  [phone] (cell)

## 2022-07-27 NOTE — TELEPHONE ENCOUNTER
Patient was seen by PCP yesterday post call. PCP plan is as follows:  \"BP just at goal in office today; home BP monitor does not correlate--recommend she get new machine. BP lability and recurrence of palpitations likely related to Verapamil's short half-life; increase from BID to TID, hold dose for BP <110/60 accompanied by dizziness, fatigue, or other symptoms of hypotension. Recommend she check BP once daily one hour after medication administration, avoid taking multiple times daily. \"    Since BP is being managed by pcp, cardiac NP does not want to be involved. Reviewed patient strips of event monitor (nothing new to report). Will continue to monitor the patient with event monitor.

## 2022-07-28 LAB
ECHO AO ROOT DIAM: 2.4 CM
ECHO AV PEAK GRADIENT: 11 MMHG
ECHO AV PEAK VELOCITY: 1.6 M/S
ECHO EST RA PRESSURE: 10 MMHG
ECHO LA DIAMETER: 3.7 CM
ECHO LA TO AORTIC ROOT RATIO: 1.54
ECHO LA VOL 2C: 46 ML (ref 22–52)
ECHO LA VOL 4C: 47 ML (ref 22–52)
ECHO LA VOLUME AREA LENGTH: 54 ML
ECHO LV E' SEPTAL VELOCITY: 8 CM/S
ECHO LV FRACTIONAL SHORTENING: 29 % (ref 28–44)
ECHO LV INTERNAL DIMENSION DIASTOLIC: 4.1 CM (ref 3.9–5.3)
ECHO LV INTERNAL DIMENSION SYSTOLIC: 2.9 CM
ECHO LV IVSD: 0.9 CM (ref 0.6–0.9)
ECHO LV MASS 2D: 114.1 G (ref 67–162)
ECHO LV POSTERIOR WALL DIASTOLIC: 0.9 CM (ref 0.6–0.9)
ECHO LV RELATIVE WALL THICKNESS RATIO: 0.44
ECHO LVOT AREA: 3.1 CM2
ECHO LVOT DIAM: 2 CM
ECHO MV A VELOCITY: 0.69 M/S
ECHO MV E DECELERATION TIME (DT): 180.6 MS
ECHO MV E VELOCITY: 0.84 M/S
ECHO MV E/A RATIO: 1.22
ECHO MV E/E' SEPTAL: 10.5
ECHO RIGHT VENTRICULAR SYSTOLIC PRESSURE (RVSP): 28 MMHG
ECHO TV REGURGITANT MAX VELOCITY: 2.15 M/S
ECHO TV REGURGITANT PEAK GRADIENT: 19 MMHG

## 2022-07-28 PROCEDURE — 93306 TTE W/DOPPLER COMPLETE: CPT | Performed by: INTERNAL MEDICINE

## 2022-08-01 ENCOUNTER — TELEPHONE (OUTPATIENT)
Dept: CARDIOLOGY CLINIC | Age: 62
End: 2022-08-01

## 2022-08-01 NOTE — TELEPHONE ENCOUNTER
Spent 25 minutes on the phone trying to make an appt for this pt after the call in request was: \"Seen in ER this AM. Needs to see cardiologist this week. \"  Offered this Wed or Thursday (only have a provider two days this week). Offered an appt for the [place] location. Pt declines all offered appts due to other appts that she has out of state. Pt's main complaint is \"gas, upper chest discomfort\" since verapamil dose was adjusted. Pt demands \"CAPSULE because a few years ago I was on it and had no troubles. \"    Explained to the pt that if she is having current sx's with verapamil tablet that is most likely she will have the same with a capsule since its in the same class. Provider that last changed verapamil was Vear Cushing, NP on 7/26. During the conversation pt was all over the place (with inappropriate laughter). At one point she was upset with health care and costs, questioning why providers cannot just call,  referrals, copay costs, trying to see Dr. Imani Duarte, doesn't want to cancel her other appts for this week.     Gave pt the following options:   Send a [employer] message to Meka May NP  [facility], NP if Meka May is unable to help with verapamil change  Appt this week with cariology    Pt states that she will send a message to Meka May NP.

## 2022-08-02 ENCOUNTER — TELEPHONE (OUTPATIENT)
Dept: FAMILY MEDICINE CLINIC | Age: 62
End: 2022-08-02

## 2022-08-02 ENCOUNTER — DOCUMENTATION ONLY (OUTPATIENT)
Dept: FAMILY MEDICINE CLINIC | Age: 62
End: 2022-08-02

## 2022-08-02 NOTE — TELEPHONE ENCOUNTER
Naresh PT called stating that this patient had a appt yesterday and had to cancel due to her needing a approval from her insurance ([facility]) which I faxed on 7/8. I called [facility] and spoke to [facility]. and she stated that the patient has exceeded her visits and needed a rehab services extension form filled out,which was already competed by Rahul Boggs PT by Merle Limon and faxed to [phone]. Naresh is going to fax the form again

## 2022-08-03 ENCOUNTER — OFFICE VISIT (OUTPATIENT)
Dept: FAMILY MEDICINE CLINIC | Age: 62
End: 2022-08-03
Payer: COMMERCIAL

## 2022-08-03 VITALS
OXYGEN SATURATION: 98 % | RESPIRATION RATE: 18 BRPM | HEART RATE: 51 BPM | SYSTOLIC BLOOD PRESSURE: 158 MMHG | DIASTOLIC BLOOD PRESSURE: 62 MMHG | HEIGHT: 66 IN | BODY MASS INDEX: 24.43 KG/M2 | WEIGHT: 152 LBS | TEMPERATURE: 98.4 F

## 2022-08-03 DIAGNOSIS — F41.8 ANXIETY ABOUT HEALTH: ICD-10-CM

## 2022-08-03 DIAGNOSIS — R00.2 PALPITATIONS: ICD-10-CM

## 2022-08-03 DIAGNOSIS — R07.2 PRECORDIAL PAIN: ICD-10-CM

## 2022-08-03 DIAGNOSIS — R10.13 DYSPEPSIA: ICD-10-CM

## 2022-08-03 DIAGNOSIS — I10 ESSENTIAL HYPERTENSION: Primary | ICD-10-CM

## 2022-08-03 PROCEDURE — 99214 OFFICE O/P EST MOD 30 MIN: CPT

## 2022-08-03 PROCEDURE — 93000 ELECTROCARDIOGRAM COMPLETE: CPT

## 2022-08-03 RX ORDER — VERAPAMIL HYDROCHLORIDE 120 MG/1
120 CAPSULE, EXTENDED RELEASE ORAL DAILY
Qty: 90 CAPSULE | Refills: 0 | Status: SHIPPED | OUTPATIENT
Start: 2022-08-03 | End: 2022-08-17

## 2022-08-03 NOTE — PROGRESS NOTES
Fatou Yo is a 57 y.o. female presenting for/with:    Chief Complaint   Patient presents with    Follow-up     Chest pains. Patient was put on a heart monitor July 6 until aug 6. Visit Vitals  BP (!) 158/62 (BP 1 Location: Left upper arm, BP Patient Position: Sitting, BP Cuff Size: Adult long)   Pulse (!) 51   Temp 98.4 °F (36.9 °C) (Temporal)   Resp 18   Ht 5' 6\" (1.676 m)   Wt 152 lb (68.9 kg)   SpO2 98%   BMI 24.53 kg/m²     Pain Scale: /10  Pain Location:     1. \"Have you been to the ER, urgent care clinic since your last visit? Hospitalized since your last visit? \" Yes Where: [facility]    2. \"Have you seen or consulted any other health care providers outside of the [address] [place] [address] since your last visit? \" Yes Reason for visit: chest pains      3. For patients aged 39-70: Has the patient had a colonoscopy / FIT/ Cologuard? Yes - Care Gap present. Rooming MA/LPN to request most recent results      If the patient is female:    4. For patients aged 41-77: Has the patient had a mammogram within the past 2 years? No      5. For patients aged 21-65: Has the patient had a pap smear?  No      Symptom review:  NO  Fever   NO  Shaking chills  NO  Cough  NO  Body aches  NO  Coughing up blood  NO  Chest congestion  NO  Chest pain  NO  Shortness of breath  NO  Profound Loss of smell/taste  NO  Nausea/Vomiting   NO  Loose stool/Diarrhea  NO  any skin issues    Patient Risk Factors Reviewed as follows:  NO  have you been in Close contact with confirmed COVID19 patient   NO  History of recent travel to affected geographical areas within the past 14 days  NO  COPD  NO  Active Cancer/Leukemia/Lymphoma/Chemotherapy  NO  Oral steroid use  NO  Pregnant  NO  Diabetes Mellitus  NO  Heart disease  NO  Asthma  NO Health care worker at home  [address] care worker  NO Is there a Pregnant Woman in the home  NO Dialysis pt in the home   NO a large number of people living in the home    Learning Assessment 8/3/2022   PRIMARY LEARNER Patient   CO-LEARNER CAREGIVER -   PRIMARY LANGUAGE ENGLISH   LEARNER PREFERENCE PRIMARY DEMONSTRATION     -   ANSWERED BY self   RELATIONSHIP SELF     Fall Risk Assessment, last 12 mths 7/26/2022   Able to walk? Yes   Fall in past 12 months? 0   Do you feel unsteady? 0   Are you worried about falling 0       3 most recent PHQ Screens 7/8/2022   Little interest or pleasure in doing things Not at all   Feeling down, depressed, irritable, or hopeless Not at all   Total Score PHQ 2 0     Abuse Screening Questionnaire 7/26/2022   Do you ever feel afraid of your partner? N   Are you in a relationship with someone who physically or mentally threatens you? N   Is it safe for you to go home?  Y       ADL Assessment 7/1/2022   Feeding yourself No Help Needed   Getting from bed to chair No Help Needed   Getting dressed No Help Needed   Bathing or showering No Help Needed   Walk across the room (includes cane/walker) No Help Needed   Using the telphone No Help Needed   Taking your medications No Help Needed   Preparing meals No Help Needed   Managing money (expenses/bills) No Help Needed   Moderately strenuous housework (laundry) No Help Needed   Shopping for personal items (toiletries/medicines) No Help Needed   Shopping for groceries No Help Needed   Driving No Help Needed   Climbing a flight of stairs No Help Needed   Getting to places beyond walking distances No Help Needed      Advance Care Planning 7/26/2022   Patient's Healthcare Decision Maker is: Legal Next of Kin   Confirm Advance Directive Yes, not on file

## 2022-08-03 NOTE — PROGRESS NOTES
Chief Complaint   Patient presents with    Follow-up     Chest pains. Patient was put on a heart monitor July 6 until aug 6. HPI:     is a 57 y.o. female who presents for hospital follow-up. She was seen in the ED at [facility] on 8/1 for left sided CP and palpitations with a negative workup. She endorses dyspepsia and sharp left chest pains worse with certain movements and when lifting her left arm over her head; she attributes the recent increase in her verapamil dose to these new symptoms. She has been taking omeprazole and famotidine without much change. She stopped taking the midday dose of verapamil without much difference, though she still believes that this is the cause of her symptoms. She insists that she has taken the extended release version of verapamil in the past without ill effect and would like to go back on this medication. She wonders if some of her symptoms are exacerbated by health anxiety; she was given Ativan in the ER about a month ago and has found that her BP and palpitations are improved if she takes this. She inquires about taking this medication daily. Allergies   Allergen Reactions    Iodine Anaphylaxis    Iodine And Iodide Containing Products Anaphylaxis    Beta-Blockers (Beta-Adrenergic Blocking Agts) Diarrhea     METOPROLOL TARTRATE  DIARRHEA AFTER 3 DOSES. Mold Unknown (comments)    Ragweed Pollen Unknown (comments)    Sulfa (Sulfonamide Antibiotics) Angioedema       Current Outpatient Medications   Medication Sig    verapamil ER (VERELAN) 120 mg ER capsule Take 1 Capsule by mouth in the morning. levothyroxine (SYNTHROID) 137 mcg tablet Take 1 pill every morning 7 days per week    lisinopriL (PRINIVIL, ZESTRIL) 2.5 mg tablet Take 1 Tablet by mouth daily. busPIRone (BUSPAR) 10 mg tablet Take 1 Tablet by mouth three (3) times daily. dicyclomine (BENTYL) 10 mg capsule Take 1 Capsule by mouth three (3) times daily.  Indications: irritable colon LORazepam (Ativan) 0.5 mg tablet Take 1 Tablet by mouth every eight (8) hours as needed for Anxiety. Max Daily Amount: 1.5 mg. ALPHA LIPOIC ACID PO Take 2 Tablets by mouth daily. turmeric (CURCUMIN) Take 2 Tablets by mouth daily. glucagon 1 mg solr 1 mg by IntraMUSCular route as needed (low blood sugar). insulin glargine (LANTUS,BASAGLAR) 100 unit/mL (3 mL) inpn Inject 18 units SQ every morning    Insulin Needles, Disposable, 31 gauge x 5/16\" ndle Inject daily with insulin up to 4 times daily    Blood-Glucose Meter monitoring kit Check sugar up to 5 times daily, DX: E10.40    glucose blood VI test strips (ASCENSIA AUTODISC VI, ONE TOUCH ULTRA TEST VI) strip Check sugar up to 5 times daily, DX: E10.40    lancets misc Check sugar up to 5 times daily, DX: E10.40    cholecalciferol, vitamin D3, (Vitamin D3) 50 mcg (2,000 unit) tab Take 1 Tablet by mouth daily. insulin lispro (HUMALOG) 100 unit/mL kwikpen Inject three times daily before meals according to sliding scale. Max dose 10 units     No current facility-administered medications for this visit. Past Medical History:   Diagnosis Date    Diabetes ([facility] [address]     H/O colonoscopy 04/13/2019    H/O mammogram 02/08/2021    Hypertension     Hypothyroidism due to Hashimoto's thyroiditis 1999       Family History   Problem Relation Age of Onset    Asthma Father     Diabetes Father     Heart Disease Father     Hypertension Father     Hypertension Sister     Diabetes Son        Review of Systems   Constitutional:  Negative for chills, fever and malaise/fatigue. Cardiovascular:  Positive for chest pain and palpitations. Gastrointestinal:  Positive for heartburn. Negative for abdominal pain, blood in stool, constipation, diarrhea, melena, nausea and vomiting. Neurological:  Negative for dizziness and headaches. Psychiatric/Behavioral:  Negative for depression. The patient is nervous/anxious.        BP (!) 158/62 (BP 1 Location: Left upper arm, BP Patient Position: Sitting, BP Cuff Size: Adult long)   Pulse (!) 51   Temp 98.4 °F (36.9 °C) (Temporal)   Resp 18   Ht 5' 6\" (1.676 m)   Wt 152 lb (68.9 kg)   SpO2 98%   BMI 24.53 kg/m²     Wt Readings from Last 3 Encounters:   08/03/22 152 lb (68.9 kg)   07/26/22 152 lb 3.2 oz (69 kg)   07/08/22 148 lb 8 oz (67.4 kg)       3 most recent PHQ Screens 7/8/2022   Little interest or pleasure in doing things Not at all   Feeling down, depressed, irritable, or hopeless Not at all   Total Score PHQ 2 0       Physical Exam  Constitutional:       General: She is not in acute distress. Appearance: Normal appearance. She is normal weight. HENT:      Head: Normocephalic and atraumatic. Eyes:      Extraocular Movements: Extraocular movements intact. Conjunctiva/sclera: Conjunctivae normal.      Pupils: Pupils are equal, round, and reactive to light. Cardiovascular:      Rate and Rhythm: Normal rate and regular rhythm. FrequentExtrasystoles are present. Pulses: Normal pulses. Heart sounds: Normal heart sounds, S1 normal and S2 normal. No murmur heard. No friction rub. No gallop. Pulmonary:      Effort: Pulmonary effort is normal.      Breath sounds: No wheezing, rhonchi or rales. Abdominal:      General: Bowel sounds are normal.      Palpations: Abdomen is soft. There is no mass. Tenderness: There is no abdominal tenderness. Musculoskeletal:      Right lower leg: No edema. Left lower leg: No edema. Skin:     General: Skin is warm and dry. Neurological:      General: No focal deficit present. Mental Status: She is alert and oriented to person, place, and time. Psychiatric:         Mood and Affect: Mood normal.         Behavior: Behavior normal.         Thought Content: Thought content normal.         Judgment: Judgment normal.       ASSESSMENT AND PLAN:       ICD-10-CM ICD-9-CM    1. Essential hypertension  I10 401.9 verapamil ER (VERELAN) 120 mg ER capsule      2.  Palpitations R00.2 785.1 verapamil ER (VERELAN) 120 mg ER capsule      AMB POC EKG ROUTINE W/ 12 LEADS, INTER & REP      3. Dyspepsia  R10.13 536.8       4. Precordial pain  R07.2 786.51       5. Anxiety about health  F41.8 300.09           Frequent ectopy observed during exam, but not captured on EKG. EKG: normal EKG, normal sinus rhythm, unchanged from previous tracings. Discussed risks of chronic benzodiazepine use; she has some medication leftover from her ER visit--she may continue to use this, but I do not recommend for long-term use. Recommend breathing exercises, medication/prayer, distraction as a means to control her health-related anxiety; consider therapy or SSRI if these techniques are ineffective. Medication Side Effects and Warnings were discussed with patient:  yes  Patient Labs were reviewed:  yes  Patient Past Records were reviewed:  yes      Patient aware of plan of care and verbalized understanding. Questions answered. RTC PCP as planned or sooner if needed. On this date 08/03/2022 I have spent 30 minutes reviewing previous notes, test results and face to face with the patient discussing the diagnosis and importance of compliance with the treatment plan as well as documenting on the day of the visit.     Jacqueline Kraus NP

## 2022-08-05 ENCOUNTER — TELEPHONE (OUTPATIENT)
Dept: CARDIOLOGY CLINIC | Age: 62
End: 2022-08-05

## 2022-08-05 ENCOUNTER — APPOINTMENT (OUTPATIENT)
Dept: GENERAL RADIOLOGY | Age: 62
End: 2022-08-05
Attending: EMERGENCY MEDICINE
Payer: COMMERCIAL

## 2022-08-05 ENCOUNTER — HOSPITAL ENCOUNTER (EMERGENCY)
Age: 62
Discharge: ANOTHER HOSPITAL | End: 2022-08-05
Attending: EMERGENCY MEDICINE
Payer: COMMERCIAL

## 2022-08-05 VITALS
DIASTOLIC BLOOD PRESSURE: 57 MMHG | HEART RATE: 86 BPM | SYSTOLIC BLOOD PRESSURE: 164 MMHG | OXYGEN SATURATION: 99 % | RESPIRATION RATE: 22 BRPM | TEMPERATURE: 98.5 F

## 2022-08-05 DIAGNOSIS — R55 SYNCOPE AND COLLAPSE: ICD-10-CM

## 2022-08-05 DIAGNOSIS — R00.1 SYMPTOMATIC BRADYCARDIA: Primary | ICD-10-CM

## 2022-08-05 LAB
ALBUMIN SERPL-MCNC: 3.6 G/DL (ref 3.5–5)
ALBUMIN/GLOB SERPL: 1.2 {RATIO} (ref 1.1–2.2)
ALP SERPL-CCNC: 79 U/L (ref 45–117)
ALT SERPL-CCNC: 33 U/L (ref 12–78)
ANION GAP SERPL CALC-SCNC: 8 MMOL/L (ref 5–15)
APTT PPP: 24.2 SEC (ref 22.1–31)
AST SERPL-CCNC: 19 U/L (ref 15–37)
ATRIAL RATE: 59 BPM
BASOPHILS # BLD: 0.1 K/UL (ref 0–0.1)
BASOPHILS NFR BLD: 1 % (ref 0–1)
BILIRUB SERPL-MCNC: 0.4 MG/DL (ref 0.2–1)
BUN SERPL-MCNC: 17 MG/DL (ref 6–20)
BUN/CREAT SERPL: 19 (ref 12–20)
CALCIUM SERPL-MCNC: 9.1 MG/DL (ref 8.5–10.1)
CALCULATED R AXIS, ECG10: 49 DEGREES
CALCULATED T AXIS, ECG11: 27 DEGREES
CHLORIDE SERPL-SCNC: 99 MMOL/L (ref 97–108)
CO2 SERPL-SCNC: 29 MMOL/L (ref 21–32)
CREAT SERPL-MCNC: 0.89 MG/DL (ref 0.55–1.02)
DIAGNOSIS, 93000: NORMAL
DIFFERENTIAL METHOD BLD: NORMAL
EOSINOPHIL # BLD: 0.2 K/UL (ref 0–0.4)
EOSINOPHIL NFR BLD: 2 % (ref 0–7)
ERYTHROCYTE [DISTWIDTH] IN BLOOD BY AUTOMATED COUNT: 11.8 % (ref 11.5–14.5)
GLOBULIN SER CALC-MCNC: 3.1 G/DL (ref 2–4)
GLUCOSE BLD STRIP.AUTO-MCNC: 192 MG/DL (ref 65–117)
GLUCOSE SERPL-MCNC: 185 MG/DL (ref 65–100)
HCT VFR BLD AUTO: 36.2 % (ref 35–47)
HGB BLD-MCNC: 12.6 G/DL (ref 11.5–16)
IMM GRANULOCYTES # BLD AUTO: 0 K/UL (ref 0–0.04)
IMM GRANULOCYTES NFR BLD AUTO: 0 % (ref 0–0.5)
INR PPP: 1.2 (ref 0.9–1.1)
LYMPHOCYTES # BLD: 1.9 K/UL (ref 0.8–3.5)
LYMPHOCYTES NFR BLD: 23 % (ref 12–49)
MCH RBC QN AUTO: 32.7 PG (ref 26–34)
MCHC RBC AUTO-ENTMCNC: 34.8 G/DL (ref 30–36.5)
MCV RBC AUTO: 94 FL (ref 80–99)
MONOCYTES # BLD: 1 K/UL (ref 0–1)
MONOCYTES NFR BLD: 12 % (ref 5–13)
NEUTS SEG # BLD: 5.2 K/UL (ref 1.8–8)
NEUTS SEG NFR BLD: 62 % (ref 32–75)
NRBC # BLD: 0 K/UL (ref 0–0.01)
NRBC BLD-RTO: 0 PER 100 WBC
P-R INTERVAL, ECG05: 150 MS
PLATELET # BLD AUTO: 252 K/UL (ref 150–400)
PMV BLD AUTO: 10.1 FL (ref 8.9–12.9)
POTASSIUM SERPL-SCNC: 3.9 MMOL/L (ref 3.5–5.1)
PROT SERPL-MCNC: 6.7 G/DL (ref 6.4–8.2)
PROTHROMBIN TIME: 11.9 SEC (ref 9–11.1)
Q-T INTERVAL, ECG07: 416 MS
QRS DURATION, ECG06: 90 MS
QTC CALCULATION (BEZET), ECG08: 411 MS
RBC # BLD AUTO: 3.85 M/UL (ref 3.8–5.2)
SERVICE CMNT-IMP: ABNORMAL
SODIUM SERPL-SCNC: 136 MMOL/L (ref 136–145)
THERAPEUTIC RANGE,PTTT: NORMAL SECS (ref 58–77)
TROPONIN-HIGH SENSITIVITY: 5 NG/L (ref 0–51)
VENTRICULAR RATE, ECG03: 59 BPM
WBC # BLD AUTO: 8.3 K/UL (ref 3.6–11)

## 2022-08-05 PROCEDURE — 99285 EMERGENCY DEPT VISIT HI MDM: CPT

## 2022-08-05 PROCEDURE — 36415 COLL VENOUS BLD VENIPUNCTURE: CPT

## 2022-08-05 PROCEDURE — 82962 GLUCOSE BLOOD TEST: CPT

## 2022-08-05 PROCEDURE — 85610 PROTHROMBIN TIME: CPT

## 2022-08-05 PROCEDURE — 74011250636 HC RX REV CODE- 250/636: Performed by: EMERGENCY MEDICINE

## 2022-08-05 PROCEDURE — 85025 COMPLETE CBC W/AUTO DIFF WBC: CPT

## 2022-08-05 PROCEDURE — 93005 ELECTROCARDIOGRAM TRACING: CPT

## 2022-08-05 PROCEDURE — 85730 THROMBOPLASTIN TIME PARTIAL: CPT

## 2022-08-05 PROCEDURE — 80053 COMPREHEN METABOLIC PANEL: CPT

## 2022-08-05 PROCEDURE — 96374 THER/PROPH/DIAG INJ IV PUSH: CPT

## 2022-08-05 PROCEDURE — 84484 ASSAY OF TROPONIN QUANT: CPT

## 2022-08-05 RX ORDER — ONDANSETRON 2 MG/ML
4 INJECTION INTRAMUSCULAR; INTRAVENOUS
Status: COMPLETED | OUTPATIENT
Start: 2022-08-05 | End: 2022-08-05

## 2022-08-05 RX ORDER — SODIUM CHLORIDE 9 MG/ML
1000 INJECTION, SOLUTION INTRAVENOUS ONCE
Status: COMPLETED | OUTPATIENT
Start: 2022-08-05 | End: 2022-08-05

## 2022-08-05 RX ADMIN — SODIUM CHLORIDE 1000 ML: 9 INJECTION, SOLUTION INTRAVENOUS at 16:27

## 2022-08-05 RX ADMIN — ONDANSETRON 4 MG: 2 INJECTION INTRAMUSCULAR; INTRAVENOUS at 16:27

## 2022-08-05 NOTE — ED TRIAGE NOTES
Witnessed syncopal episode , approx 2 mins, rescue arrived to find her axox3 with heart monitor in place , company called pt to check on her but it is not known what if anything they saw. Pt reports feeling light headed similar when sugar is low, drank some juice and glucose gel and fainted.  Sugar here is 192 after

## 2022-08-05 NOTE — TELEPHONE ENCOUNTER
[facility] alert   14:29:30 EDT Sinus Rhythm with PVC(s) and Fainted/Fell confirmed. Called pt on both listed numbers. No answer. Left message asking for a return call asap. Emergency contact was notified: Becky French   Verified patient with two patient identifiers. Mr. Reginald Mendoza witnessed the syncopal episode and she did have a ground level fall. EMS is transporting the patient to the hospital ER currently.

## 2022-08-05 NOTE — ED PROVIDER NOTES
40-year-old female with a history of diabetes, hypertension, thyroiditis presents with a chief complaint of syncope. Patient felt as though her blood sugar was getting low this afternoon. She felt the mental fog. She went and sat down and made herself a peanut butter and honey sandwich. She states that she was playing with the dog when she passed out in the chair and her kitchen. She did not fall to the ground or hit her head. Her  tried to arouse her. She did regain consciousness fairly quickly. She denies chest pain, shortness of breath, abdominal pain, GI or urinary symptoms. She is currently wearing a cardiac monitor as prescribed by cardiology for frequent PVCs. Past Medical History:   Diagnosis Date    Diabetes ([facility] [address]     H/O colonoscopy 2019    H/O mammogram 2021    Hypertension     Hypothyroidism due to Hashimoto's thyroiditis        Past Surgical History:   Procedure Laterality Date    HX CARPAL TUNNEL RELEASE      HX  SECTION      HX COLONOSCOPY  2019    recommended 5 year followup colonoscopy         Family History:   Problem Relation Age of Onset    Asthma Father     Diabetes Father     Heart Disease Father     Hypertension Father     Hypertension Sister     Diabetes Son        Social History     Socioeconomic History    Marital status:      Spouse name: Not on file    Number of children: Not on file    Years of education: Not on file    Highest education level: Not on file   Occupational History    Not on file   Tobacco Use    Smoking status: Never    Smokeless tobacco: Never   Vaping Use    Vaping Use: Never used   Substance and Sexual Activity    Alcohol use:  Yes     Alcohol/week: 0.8 standard drinks     Types: 1 Glasses of wine per week    Drug use: Never    Sexual activity: Yes   Other Topics Concern     Service No    Blood Transfusions No    Caffeine Concern No    Occupational Exposure No    Hobby Hazards No    Sleep Concern No    Stress Concern No    Weight Concern No    Special Diet No    Back Care No    Exercise Yes    Bike Helmet No    Seat Belt Yes    Self-Exams Yes   Social History Narrative    Not on file     Social Determinants of Health     Financial Resource Strain: Not on file   Food Insecurity: Not on file   Transportation Needs: Not on file   Physical Activity: Not on file   Stress: Not on file   Social Connections: Not on file   Intimate Partner Violence: Not on file   Housing Stability: Not on file         ALLERGIES: Iodine, Iodine and iodide containing products, Beta-blockers (beta-adrenergic blocking agts), Mold, Ragweed pollen, and Sulfa (sulfonamide antibiotics)    Review of Systems   Constitutional:  Negative for fever. HENT:  Negative for rhinorrhea. Respiratory:  Negative for shortness of breath. Cardiovascular:  Negative for chest pain. Gastrointestinal:  Negative for abdominal pain. Genitourinary:  Negative for dysuria. Musculoskeletal:  Negative for back pain. Skin:  Negative for wound. Neurological:  Positive for syncope. Negative for headaches. Psychiatric/Behavioral:  Negative for confusion. There were no vitals filed for this visit. Physical Exam  Vitals and nursing note reviewed. Constitutional:       General: She is not in acute distress. Appearance: Normal appearance. She is not ill-appearing, toxic-appearing or diaphoretic. HENT:      Head: Normocephalic and atraumatic. Eyes:      Extraocular Movements: Extraocular movements intact. Cardiovascular:      Rate and Rhythm: Normal rate. Pulses: Normal pulses. Pulmonary:      Effort: Pulmonary effort is normal. No respiratory distress. Abdominal:      General: There is no distension. Musculoskeletal:         General: Normal range of motion. Cervical back: Normal range of motion. Skin:     General: Skin is dry. Neurological:      General: No focal deficit present.       Mental Status: She is alert and oriented to person, place, and time. Psychiatric:         Mood and Affect: Mood normal.        MDM  Number of Diagnoses or Management Options  Symptomatic bradycardia  Syncope and collapse  Diagnosis management comments:       Patient presents after syncopal episode. Blood glucose level is normal.  EKG shows sinus bradycardia with no evidence of heart block. She does have multiple PVCs on telemetry. CBC shows normal hemoglobin ruling out anemia. Troponin is normal.  Electrolytes normal.  Patient will be transferred to [facility] for evaluation by cardiology given her syncope. Patient comfortable and agreeable with transfer. Total critical care time spent exclusive of procedures:  37 minutes           Amount and/or Complexity of Data Reviewed  Clinical lab tests: ordered and reviewed  Tests in the radiology section of CPT®: ordered and reviewed      ED Course as of 08/05/22 1859   Fri Aug 05, 2022   1557 EKG shows sinus bradycardia at a rate of 59, normal intervals, normal axis, no ischemic changes.  [RD]      ED Course User Index  [RD] Tayla Villalpando MD       Procedures

## 2022-08-06 ENCOUNTER — HOSPITAL ENCOUNTER (OUTPATIENT)
Age: 62
Setting detail: OBSERVATION
Discharge: HOME | End: 2022-08-06
Attending: INTERNAL MEDICINE | Admitting: STUDENT IN AN ORGANIZED HEALTH CARE EDUCATION/TRAINING PROGRAM
Payer: COMMERCIAL

## 2022-08-06 VITALS
HEART RATE: 68 BPM | TEMPERATURE: 98.7 F | SYSTOLIC BLOOD PRESSURE: 190 MMHG | DIASTOLIC BLOOD PRESSURE: 59 MMHG | OXYGEN SATURATION: 100 % | RESPIRATION RATE: 18 BRPM

## 2022-08-06 PROBLEM — R55 SYNCOPE: Status: ACTIVE | Noted: 2022-08-06

## 2022-08-06 LAB
GLUCOSE BLD STRIP.AUTO-MCNC: 151 MG/DL (ref 65–117)
GLUCOSE BLD STRIP.AUTO-MCNC: 171 MG/DL (ref 65–117)
GLUCOSE BLD STRIP.AUTO-MCNC: 180 MG/DL (ref 65–117)
SERVICE CMNT-IMP: ABNORMAL

## 2022-08-06 PROCEDURE — 74011636637 HC RX REV CODE- 636/637: Performed by: STUDENT IN AN ORGANIZED HEALTH CARE EDUCATION/TRAINING PROGRAM

## 2022-08-06 PROCEDURE — 65270000046 HC RM TELEMETRY

## 2022-08-06 PROCEDURE — 74011250637 HC RX REV CODE- 250/637: Performed by: STUDENT IN AN ORGANIZED HEALTH CARE EDUCATION/TRAINING PROGRAM

## 2022-08-06 PROCEDURE — 2709999900 HC NON-CHARGEABLE SUPPLY

## 2022-08-06 PROCEDURE — 82962 GLUCOSE BLOOD TEST: CPT

## 2022-08-06 PROCEDURE — 74011250636 HC RX REV CODE- 250/636: Performed by: STUDENT IN AN ORGANIZED HEALTH CARE EDUCATION/TRAINING PROGRAM

## 2022-08-06 PROCEDURE — G0378 HOSPITAL OBSERVATION PER HR: HCPCS

## 2022-08-06 PROCEDURE — 74011000250 HC RX REV CODE- 250: Performed by: STUDENT IN AN ORGANIZED HEALTH CARE EDUCATION/TRAINING PROGRAM

## 2022-08-06 RX ORDER — INSULIN LISPRO 100 [IU]/ML
INJECTION, SOLUTION INTRAVENOUS; SUBCUTANEOUS
Status: DISCONTINUED | OUTPATIENT
Start: 2022-08-06 | End: 2022-08-06 | Stop reason: HOSPADM

## 2022-08-06 RX ORDER — SODIUM CHLORIDE 0.9 % (FLUSH) 0.9 %
5-40 SYRINGE (ML) INJECTION EVERY 8 HOURS
Status: DISCONTINUED | OUTPATIENT
Start: 2022-08-06 | End: 2022-08-06 | Stop reason: HOSPADM

## 2022-08-06 RX ORDER — LISINOPRIL 5 MG/1
2.5 TABLET ORAL DAILY
Status: DISCONTINUED | OUTPATIENT
Start: 2022-08-06 | End: 2022-08-06 | Stop reason: HOSPADM

## 2022-08-06 RX ORDER — VERAPAMIL HYDROCHLORIDE 120 MG/1
120 CAPSULE, EXTENDED RELEASE ORAL DAILY
Status: DISCONTINUED | OUTPATIENT
Start: 2022-08-06 | End: 2022-08-06 | Stop reason: SDUPTHER

## 2022-08-06 RX ORDER — HYDRALAZINE HYDROCHLORIDE 20 MG/ML
10 INJECTION INTRAMUSCULAR; INTRAVENOUS
Status: DISCONTINUED | OUTPATIENT
Start: 2022-08-06 | End: 2022-08-06 | Stop reason: HOSPADM

## 2022-08-06 RX ORDER — LORAZEPAM 0.5 MG/1
0.5 TABLET ORAL
Status: DISCONTINUED | OUTPATIENT
Start: 2022-08-06 | End: 2022-08-06 | Stop reason: HOSPADM

## 2022-08-06 RX ORDER — ONDANSETRON 2 MG/ML
4 INJECTION INTRAMUSCULAR; INTRAVENOUS
Status: DISCONTINUED | OUTPATIENT
Start: 2022-08-06 | End: 2022-08-06 | Stop reason: HOSPADM

## 2022-08-06 RX ORDER — INSULIN GLARGINE 100 [IU]/ML
18 INJECTION, SOLUTION SUBCUTANEOUS DAILY
Status: DISCONTINUED | OUTPATIENT
Start: 2022-08-06 | End: 2022-08-06 | Stop reason: HOSPADM

## 2022-08-06 RX ORDER — VERAPAMIL HYDROCHLORIDE 120 MG/1
120 TABLET, FILM COATED, EXTENDED RELEASE ORAL
Status: DISCONTINUED | OUTPATIENT
Start: 2022-08-06 | End: 2022-08-06 | Stop reason: HOSPADM

## 2022-08-06 RX ORDER — ACETAMINOPHEN 325 MG/1
650 TABLET ORAL
Status: DISCONTINUED | OUTPATIENT
Start: 2022-08-06 | End: 2022-08-06 | Stop reason: HOSPADM

## 2022-08-06 RX ORDER — ENOXAPARIN SODIUM 100 MG/ML
40 INJECTION SUBCUTANEOUS DAILY
Status: DISCONTINUED | OUTPATIENT
Start: 2022-08-06 | End: 2022-08-06 | Stop reason: HOSPADM

## 2022-08-06 RX ORDER — SENNOSIDES 8.6 MG/1
1 TABLET ORAL DAILY PRN
Status: DISCONTINUED | OUTPATIENT
Start: 2022-08-06 | End: 2022-08-06 | Stop reason: HOSPADM

## 2022-08-06 RX ORDER — ONDANSETRON 4 MG/1
4 TABLET, ORALLY DISINTEGRATING ORAL
Status: DISCONTINUED | OUTPATIENT
Start: 2022-08-06 | End: 2022-08-06 | Stop reason: HOSPADM

## 2022-08-06 RX ORDER — BUSPIRONE HYDROCHLORIDE 10 MG/1
10 TABLET ORAL 3 TIMES DAILY
Status: DISCONTINUED | OUTPATIENT
Start: 2022-08-06 | End: 2022-08-06 | Stop reason: HOSPADM

## 2022-08-06 RX ORDER — ACETAMINOPHEN 650 MG/1
650 SUPPOSITORY RECTAL
Status: DISCONTINUED | OUTPATIENT
Start: 2022-08-06 | End: 2022-08-06 | Stop reason: HOSPADM

## 2022-08-06 RX ORDER — INSULIN GLARGINE 100 [IU]/ML
18 INJECTION, SOLUTION SUBCUTANEOUS
Status: DISCONTINUED | OUTPATIENT
Start: 2022-08-06 | End: 2022-08-06

## 2022-08-06 RX ORDER — SODIUM CHLORIDE 0.9 % (FLUSH) 0.9 %
5-40 SYRINGE (ML) INJECTION AS NEEDED
Status: DISCONTINUED | OUTPATIENT
Start: 2022-08-06 | End: 2022-08-06 | Stop reason: HOSPADM

## 2022-08-06 RX ORDER — DICYCLOMINE HYDROCHLORIDE 10 MG/1
10 CAPSULE ORAL 3 TIMES DAILY
Status: DISCONTINUED | OUTPATIENT
Start: 2022-08-06 | End: 2022-08-06 | Stop reason: HOSPADM

## 2022-08-06 RX ORDER — LEVOTHYROXINE SODIUM 125 UG/1
125 TABLET ORAL
Status: DISCONTINUED | OUTPATIENT
Start: 2022-08-06 | End: 2022-08-06

## 2022-08-06 RX ORDER — DEXTROSE MONOHYDRATE 100 MG/ML
0-250 INJECTION, SOLUTION INTRAVENOUS AS NEEDED
Status: DISCONTINUED | OUTPATIENT
Start: 2022-08-06 | End: 2022-08-06 | Stop reason: HOSPADM

## 2022-08-06 RX ORDER — MAGNESIUM SULFATE 100 %
4 CRYSTALS MISCELLANEOUS AS NEEDED
Status: DISCONTINUED | OUTPATIENT
Start: 2022-08-06 | End: 2022-08-06 | Stop reason: HOSPADM

## 2022-08-06 RX ADMIN — LISINOPRIL 2.5 MG: 5 TABLET ORAL at 09:19

## 2022-08-06 RX ADMIN — SODIUM CHLORIDE, PRESERVATIVE FREE 10 ML: 5 INJECTION INTRAVENOUS at 02:16

## 2022-08-06 RX ADMIN — SODIUM CHLORIDE, PRESERVATIVE FREE 10 ML: 5 INJECTION INTRAVENOUS at 06:16

## 2022-08-06 RX ADMIN — Medication 18 UNITS: at 10:00

## 2022-08-06 RX ADMIN — DICYCLOMINE HYDROCHLORIDE 10 MG: 10 CAPSULE ORAL at 09:17

## 2022-08-06 RX ADMIN — VERAPAMIL HYDROCHLORIDE 120 MG: 120 TABLET, FILM COATED, EXTENDED RELEASE ORAL at 09:19

## 2022-08-06 RX ADMIN — BUSPIRONE HYDROCHLORIDE 10 MG: 10 TABLET ORAL at 09:17

## 2022-08-06 RX ADMIN — Medication 2 UNITS: at 07:30

## 2022-08-06 RX ADMIN — LEVOTHYROXINE SODIUM 137 MCG: 0.11 TABLET ORAL at 07:44

## 2022-08-06 RX ADMIN — ENOXAPARIN SODIUM 40 MG: 100 INJECTION SUBCUTANEOUS at 09:17

## 2022-08-06 NOTE — DISCHARGE INSTRUCTIONS
All of your medications are the same as before you came into the hospital.  Please follow up with your PCP in one week and you can call your cardiologist office to notify them of your hospitalization. Please do not drive for the next 6 months.

## 2022-08-06 NOTE — PROGRESS NOTES
Bedside shift report given to oncoming nurse Jeyson Tonyocent RN by off going nurse, patient notes no distress during shift change will continue to assess pt     1600  Patient discharge with family no distress during discharge, discharge teaching completed by nurse

## 2022-08-06 NOTE — ED NOTES
Writer spoke to transfer center bed assignment 2101- report number [phone]. Supervisor called for transport.

## 2022-08-06 NOTE — PROGRESS NOTES
Problem: Falls - Risk of  Goal: *Absence of Falls  Description: Document Radu Vang Fall Risk and appropriate interventions in the flowsheet.   Outcome: Progressing Towards Goal  Note: Fall Risk Interventions:                      History of Falls Interventions: Bed/chair exit alarm, Door open when patient unattended, Room close to nurse's station         Problem: Patient Education: Go to Patient Education Activity  Goal: Patient/Family Education  Outcome: Progressing Towards Goal

## 2022-08-06 NOTE — ED NOTES
TRANSFER - OUT REPORT:    Verbal report given to [facility] on Dora Hilton  being transferred to 2101 for routine progression of care       Report consisted of patients Situation, Background, Assessment and   Recommendations(SBAR). Information from the following report(s) SBAR, ED Summary and MAR was reviewed with the receiving nurse. Lines:   Peripheral IV 08/05/22 Left Antecubital (Active)   Site Assessment Clean, dry, & intact 08/05/22 1602   Phlebitis Assessment 0 08/05/22 1602   Infiltration Assessment 0 08/05/22 1602   Dressing Status Clean, dry, & intact 08/05/22 1602        Opportunity for questions and clarification was provided.       Patient transported with:   NILO

## 2022-08-06 NOTE — PROGRESS NOTES
Contacted [facility] to arrange ALS transport of patient to ED [facility] bed 2101. Spoke with Anuj. Discussed patient condition, and need for cardiac monitoring. ETA is 1635. ED is aware.

## 2022-08-06 NOTE — CONSULTS
[facility]  [address] [place] [address], [address] [facility]  [phone]  WWW. [place]       CARDIOLOGY CONSULTATION       Date of  Admission: 8/6/2022 12:10 AM     Admission type:Emergency      Attending Provider: Baron Jacquie MD  Cardiology Provider:     CC/REASON FOR CONSULT: syncope     Subjective:     Arlo Kayser is a 57 y.o. female admitted for Syncope [R55]. Patient with recurrent syncope since getting COVID vaccination last year. Clinically typical vasovagal episodes. She denies chest pain, chest pressure/discomfort, dyspnea, palpitations, irregular heart beats, fatigue, orthopnea, paroxysmal nocturnal dyspnea, exertional chest pressure/discomfort, claudication, lower extremity edema, tachypnea. Previously seen by Dr. Segundo Palm, now following up with Dr. Jeremiah Ye. Patient Active Problem List    Diagnosis Date Noted    Syncope 08/06/2022    Hip pain, bilateral 07/08/2022    Back muscle spasm 07/08/2022    Arrhythmia 07/08/2022    Irritable bowel syndrome with diarrhea 07/08/2022    Anxiety as acute reaction to exceptional stress 07/08/2022    Left knee pain 03/13/2022    Hypothyroidism due to Hashimoto's thyroiditis 09/01/2021    Palpitations 09/01/2021    Chronic right-sided low back pain with bilateral sciatica 09/01/2021    History of stress fracture 09/01/2021    Type 1 diabetes mellitus with diabetic neuropathy ([facility] [address] 10/18/2018    Essential hypertension 04/22/2015      Heidy Waggoner NP  Past Medical History:   Diagnosis Date    Diabetes ([address]     H/O colonoscopy 04/13/2019    H/O mammogram 02/08/2021    Hypertension     Hypothyroidism due to Hashimoto's thyroiditis 1999      Social History     Socioeconomic History    Marital status:    Tobacco Use    Smoking status: Never    Smokeless tobacco: Never   Vaping Use    Vaping Use: Never used   Substance and Sexual Activity    Alcohol use:  Yes     Alcohol/week: 0.8 standard drinks     Types: 1 Glasses of wine per week    Drug use: Never    Sexual activity: Yes   Other Topics Concern     Service No    Blood Transfusions No    Caffeine Concern No    Occupational Exposure No    Hobby Hazards No    Sleep Concern No    Stress Concern No    Weight Concern No    Special Diet No    Back Care No    Exercise Yes    Bike Helmet No    Seat Belt Yes    Self-Exams Yes     Allergies   Allergen Reactions    Iodine Anaphylaxis    Iodine And Iodide Containing Products Anaphylaxis    Beta-Blockers (Beta-Adrenergic Blocking Agts) Diarrhea     METOPROLOL TARTRATE  DIARRHEA AFTER 3 DOSES.     Mold Unknown (comments)    Ragweed Pollen Unknown (comments)    Sulfa (Sulfonamide Antibiotics) Angioedema      Family History   Problem Relation Age of Onset    Asthma Father     Diabetes Father     Heart Disease Father     Hypertension Father     Hypertension Sister     Diabetes Son       Current Facility-Administered Medications   Medication Dose Route Frequency    busPIRone (BUSPAR) tablet 10 mg  10 mg Oral TID    dicyclomine (BENTYL) capsule 10 mg  10 mg Oral TID    insulin lispro (HUMALOG) injection   SubCUTAneous TIDAC    glucose chewable tablet 16 g  4 Tablet Oral PRN    glucagon (GLUCAGEN) injection 1 mg  1 mg IntraMUSCular PRN    dextrose 10% infusion 0-250 mL  0-250 mL IntraVENous PRN    lisinopriL (PRINIVIL, ZESTRIL) tablet 2.5 mg  2.5 mg Oral DAILY    LORazepam (ATIVAN) tablet 0.5 mg  0.5 mg Oral Q8H PRN    sodium chloride (NS) flush 5-40 mL  5-40 mL IntraVENous Q8H    sodium chloride (NS) flush 5-40 mL  5-40 mL IntraVENous PRN    acetaminophen (TYLENOL) tablet 650 mg  650 mg Oral Q6H PRN    Or    acetaminophen (TYLENOL) suppository 650 mg  650 mg Rectal Q6H PRN    ondansetron (ZOFRAN ODT) tablet 4 mg  4 mg Oral Q8H PRN    Or    ondansetron (ZOFRAN) injection 4 mg  4 mg IntraVENous Q6H PRN    enoxaparin (LOVENOX) injection 40 mg  40 mg SubCUTAneous DAILY    senna (SENOKOT) tablet 8.6 mg  1 Tablet Oral DAILY PRN verapamil ER (CALAN-SR) tablet 120 mg  120 mg Oral DAILY WITH BREAKFAST    levothyroxine (SYNTHROID) tablet 137 mcg  137 mcg Oral 6am    hydrALAZINE (APRESOLINE) 20 mg/mL injection 10 mg  10 mg IntraVENous Q6H PRN    insulin glargine (LANTUS) injection 18 Units  18 Units SubCUTAneous DAILY        Review of Symptoms:   11 systems reviewed, negative other than as stated in the HPI        Objective:      Visit Vitals  BP (!) 187/69   Pulse 67   Temp 97.9 °F (36.6 °C)   Resp 17   SpO2 99%   Breastfeeding No       Physical Exam     General: Well developed, in no acute distress, cooperative and alert  HEENT: No carotid bruits, no JVD, trach is midline. Neck Supple, PERRL, EOM intact. Heart:  Normal S1/S2 negative S3 or S4. Regular, no murmur, gallop or rub. Respiratory: Clear bilaterally, no wheezing or rales  Abdomen:   Soft, non-tender, bowel sounds are active. Extremities:  No edema, no cyanosis, atraumatic. Neuro: A&Ox3, speech clear. Skin: Skin color is normal. No rashes or lesions. Non diaphoretic  Vascular: 2+ pulses    Data Review:   Recent Labs     08/05/22  1600   WBC 8.3   HGB 12.6   HCT 36.2        Recent Labs     08/05/22  1600      K 3.9   CL 99   CO2 29   *   BUN 17   CREA 0.89   CA 9.1   ALB 3.6   TBILI 0.4   ALT 33   INR 1.2*       No results for input(s): TROIQ, CPK, CKMB in the last 72 hours. No intake or output data in the 24 hours ending 08/06/22 1010     Cardiographics    Telemetry: SR, PVCs       Assessment:       Active Problems:    Syncope (8/6/2022)         Plan:     Syncope: appears to be vasovagal based upon clinical evaluation. Recent Echo and previously normal stress test noted. Already has event monitor in place. No significant dysrhythmias noted on monitor. Check orthostatic today. Patient anxious to go home and would prefer to go today. Advised not to drive atleast for now till work up is completed. If decides to leave then follow as out pt.  May need EP evaluation. PVC: on CCB. Did not tolerate BB. Clinically feels much better. HTN: on ACEi.      Meghan Regalado MD

## 2022-08-06 NOTE — DISCHARGE SUMMARY
Hospitalist Discharge Summary     Patient ID:  Tank Hook  [id number]  32 y.o.  1960 8/6/2022    PCP on record: Russella Lennox, NP    Admit date: 8/6/2022  Discharge date and time: 8/6/2022    DISCHARGE DIAGNOSIS:    Vasovagal syncope    CONSULTATIONS:  IP CONSULT TO CARDIOLOGY    Excerpted HPI from H&P of Kristina Vera DO:  Maritza Pineda is a 57 y.o.  female with pertinent past medical history of type 1 diabetes mellitus, hypertension, Hashimoto's thyroiditis who presents as transfer from [facility] emergency department with complaints of syncope. Patient reports that \"all my problems started after I got the COVID-vaccine a year ago\". She reports intermittent episodes of syncope with prodrome of palpitations, nausea, and diaphoresis they gradually progressed to loss of consciousness and are followed by rapid return to baseline level of consciousness. She reports she has checked her blood glucose during these episodes and has not been hypoglycemic. Today she states that she was playing with her dog when she passed out on the chair in her kitchen she did not fall to the ground or strike her head. She is currently being evaluated by cardiology for frequent PVCs and is wearing a cardiac monitor. At OSH ECG demonstrated sinus bradycardia at a rate of 59 with normal intervals and no ischemic changes. Sent to our facility for cardiology evaluation for symptomatic bradycardia.    ______________________________________________________________________  DISCHARGE SUMMARY/HOSPITAL COURSE:  for full details see H&P, daily progress notes, labs, consult notes. 47-year-old  female presenting with recurrent syncopal episodes with prodrome consistent with vasovagal syncope found to have sinus bradycardia which may represent hyper vagotonia. Sent to our facility for cardiology evaluation.      Recommendations:    Syncope  Sinus bradycardia  Frequent PVCs  Patient didn't have events on the monitor and recent echo was WNL with stress test. Orthostatic testing was negative. Patient was anxious to return home as she was feeling better and currently has cardiac monitor. Patient was reminded not to drive and to follow up with PCP for vasovagal syncope. Follow up with PCP within one week   Cardiology consulted, greatly appreciate recommendations     Hypertension  Continue home lisinopril  Continue home verapamil     HAL  Continue home BuSpar     Type 1 diabetes mellitus  Well-controlled on home regimen will continue inpatient:  -18 units glargine every morning  -Conservative sliding scale regimen -will adjust as needed     Hypothyroidism secondary to Hashimoto's thyroiditis  Continue home Synthroid    _______________________________________________________________________  Patient seen and examined by me on discharge day. Pertinent Findings:  Gen:    Not in distress  Chest: Clear lungs  CVS:   Regular rhythm. No edema  Abd:  Soft, not distended, not tender  Neuro:  Alert, oriented  _______________________________________________________________________  DISCHARGE MEDICATIONS:   Current Discharge Medication List        CONTINUE these medications which have NOT CHANGED    Details   verapamil ER (VERELAN) 120 mg ER capsule Take 1 Capsule by mouth in the morning. Qty: 90 Capsule, Refills: 0    Associated Diagnoses: Essential hypertension; Palpitations      levothyroxine (SYNTHROID) 137 mcg tablet Take 1 pill every morning 7 days per week  Qty: 90 Tablet, Refills: 0    Associated Diagnoses: Hypothyroidism due to Hashimoto's thyroiditis      lisinopriL (PRINIVIL, ZESTRIL) 2.5 mg tablet Take 1 Tablet by mouth daily. Qty: 90 Tablet, Refills: 1    Associated Diagnoses: Type 1 diabetes mellitus with diabetic neuropathy (HCC)      busPIRone (BUSPAR) 10 mg tablet Take 1 Tablet by mouth three (3) times daily.   Qty: 270 Tablet, Refills: 1    Associated Diagnoses: Anxiety as acute reaction to exceptional stress      dicyclomine (BENTYL) 10 mg capsule Take 1 Capsule by mouth three (3) times daily. Indications: irritable colon  Qty: 270 Capsule, Refills: 1    Associated Diagnoses: Irritable bowel syndrome with diarrhea      LORazepam (Ativan) 0.5 mg tablet Take 1 Tablet by mouth every eight (8) hours as needed for Anxiety. Max Daily Amount: 1.5 mg.  Qty: 10 Tablet, Refills: 0    Associated Diagnoses: Heart palpitations      ALPHA LIPOIC ACID PO Take 2 Tablets by mouth daily. turmeric (CURCUMIN) Take 2 Tablets by mouth daily. insulin glargine (LANTUS,BASAGLAR) 100 unit/mL (3 mL) inpn Inject 18 units SQ every morning  Qty: 15 mL, Refills: 0      Insulin Needles, Disposable, 31 gauge x 5/16\" ndle Inject daily with insulin up to 4 times daily  Qty: 100 Each, Refills: 11      Blood-Glucose Meter monitoring kit Check sugar up to 5 times daily, DX: E10.40  Qty: 1 Kit, Refills: 0    Associated Diagnoses: Type 1 diabetes mellitus with diabetic neuropathy (HCC)      glucose blood VI test strips (ASCENSIA AUTODISC VI, ONE TOUCH ULTRA TEST VI) strip Check sugar up to 5 times daily, DX: E10.40  Qty: 200 Strip, Refills: 5    Associated Diagnoses: Type 1 diabetes mellitus with diabetic neuropathy (HCC)      lancets misc Check sugar up to 5 times daily, DX: E10.40  Qty: 200 Each, Refills: 11    Associated Diagnoses: Type 1 diabetes mellitus with diabetic neuropathy (HCC)      insulin lispro (HUMALOG) 100 unit/mL kwikpen Inject three times daily before meals according to sliding scale. Max dose 10 units  Qty: 5 Pen, Refills: 0      glucagon 1 mg solr 1 mg by IntraMUSCular route as needed (low blood sugar). Qty: 1 Each, Refills: 3      cholecalciferol, vitamin D3, (Vitamin D3) 50 mcg (2,000 unit) tab Take 1 Tablet by mouth daily. Qty: 90 Tablet, Refills: 1               Patient Follow Up Instructions:    Activity: No driving for 3 weeks  Diet: Regular Diet  Wound Care: Keep wound clean and dry    Follow-up with PCP in 7 days.   Follow-up tests/labs-cardiac monitor follow up  Follow-up Information       Follow up With Specialties Details Why [address] [facility], [address], NP Nurse Practitioner   [address]  [address] [place] [address]  [phone]            ________________________________________________________________    Risk of deterioration: Low    Condition at Discharge:  Stable  __________________________________________________________________    Disposition  Home with family, no needs    ____________________________________________________________________    Code Status: Full Code  ___________________________________________________________________      Total time in minutes spent coordinating this discharge (includes going over instructions, follow-up, prescriptions, and preparing report for sign off to her PCP) :  35 minutes    Signed:  Dany Ormond, MD

## 2022-08-06 NOTE — PROGRESS NOTES
End of Shift Note    Bedside shift change report given to [address] [place] [address] (oncoming nurse) by Dre Gamboa RN (offgoing nurse). Report included the following information SBAR, Kardex, Intake/Output, MAR, Accordion, Recent Results, and Med Rec Status    Shift worked:  7pm-7am     Shift summary and any significant changes:     Direct   admission  from \[facility]\"". Very emotional and concerned of her health. Would benefit from   visit for prayer and support.  Cardiology  consult need to be called     Concerns for physician to address:  As above     Zone phone for oncoming shift:          Dre Gamboa RN

## 2022-08-06 NOTE — PROGRESS NOTES
Spiritual [place] Assessment/[place] Note  [facility] [place]: Mya Francis      MRN: [id number]  AGE: 57 y.o.  SEX: female  Caodaism Affiliation: Allison Carey   Language: English     8/6/2022     Total Time (in minutes): 34     Spiritual Assessment begun in [facility] 2 MED TELE through conversation with:         [x]Patient        [] Family    [] Friend(s)        Reason for Consult: Initial/Spiritual assessment, patient floor     Spiritual beliefs: (Please include comment if needed)     [x] Identifies with a sparkle tradition:  Restorationist       [] Supported by a sparkle community:            [] Claims no spiritual orientation:           [] Seeking spiritual identity:                [] Adheres to an individual form of spirituality:           [] Not able to assess:                           Identified resources for coping:      [x] Prayer                               [] Music                  [] Guided Imagery     [x] Family/friends                 [] Pet visits     [] Devotional reading                         [] Unknown     [] Other:                                                Interventions offered during this visit: (See comments for more details)    Patient Interventions: Coping skills reviewed/reinforced, Initial/Spiritual assessment, patient floor, Life review/legacy, Caodaism beliefs/image of God discussed, Prayer (assurance of), Normalization of emotional/spiritual concerns, Integration of medical assessment with existing values and beliefs, Affirmation of sparkle, Catharsis/review of pertinent events in supportive environment, Iconic (affirming the presence of God/Higher Power)           Plan of Care:     [] Support spiritual and/or cultural needs    [] Support AMD and/or advance care planning process      [] Support grieving process   [] Coordinate Rites and/or Rituals    [] Coordination with community clergy   [] No spiritual needs identified at this time   [] Detailed Plan of Care below (See Comments)  [] Make referral to Music Therapy  [] Make referral to Pet Therapy     [] Make referral to Addiction services  [] Make referral to [facility]  [] Make referral to Spiritual Care Partner  [x] No future visits requested        [] Contact Spiritual Care for further referrals     Comments:  Patient, Mrs. Kyle Menard, was sitting in bed in 2101 when  visited in response to in basket request for spiritual/emotional support. Patient received  kindly and engaged at length in conversation and life review. She shared about what led to her hospitalization, her family and also shared some reflection about her sparkle journey. She indicated she was doing much better and may be discharged.  listened actively with affirmation. She expressed no need for support when asked, stating keeping her in prayer would be helpful. She was assured of chaplains prayer at the end of the visit. Patient was grateful for the visit. Visited by: Elvia Abreu.    Paging Service: Donna-JOSE ([phone])

## 2022-08-06 NOTE — PROGRESS NOTES
Patient alert and responsive pt self medicate with insulin coverage this morning with a blood sugar of 151. Patient self medicated during night shift on arrival to unit patient re-educated on the importance of allowing staff to medicate patient, patient state she will allow staff to medicate moving forward.  Patient self medicated 2 units nurse documented medication

## 2022-08-06 NOTE — H&P
Hospitalist Admission Note    NAME: Morgan Oliver   :  1960   MRN:  [id number]     Date/Time:  2022 1:01 AM    Patient PCP: Araceli Whitfield NP  ______________________________________________________________________  Given the patient's current clinical presentation, I have a high level of concern for decompensation if discharged from the emergency department. Complex decision making was performed, which includes reviewing the patient's available past medical records, laboratory results, and x-ray films. My assessment of this patient's clinical condition and my plan of care is as follows. Subjective:   CHIEF COMPLAINT: Syncope versus symptomatic bradycardia    HISTORY OF PRESENT ILLNESS:     Sammy Ahumada is a 57 y.o.  female with pertinent past medical history of type 1 diabetes mellitus, hypertension, Hashimoto's thyroiditis who presents as transfer from [facility] emergency department with complaints of syncope. Patient reports that \"all my problems started after I got the COVID-vaccine a year ago\". She reports intermittent episodes of syncope with prodrome of palpitations, nausea, and diaphoresis they gradually progressed to loss of consciousness and are followed by rapid return to baseline level of consciousness. She reports she has checked her blood glucose during these episodes and has not been hypoglycemic. Today she states that she was playing with her dog when she passed out on the chair in her kitchen she did not fall to the ground or strike her head. She is currently being evaluated by cardiology for frequent PVCs and is wearing a cardiac monitor. At OSH ECG demonstrated sinus bradycardia at a rate of 59 with normal intervals and no ischemic changes. Sent to our facility for cardiology evaluation for symptomatic bradycardia. We were asked to admit for work up and evaluation of the above problems.      Past Medical History:   Diagnosis Date    Diabetes ([address]     H/O colonoscopy 2019    H/O mammogram 2021    Hypertension     Hypothyroidism due to Hashimoto's thyroiditis         Past Surgical History:   Procedure Laterality Date    HX CARPAL TUNNEL RELEASE      HX  SECTION      HX COLONOSCOPY  2019    recommended 5 year followup colonoscopy       Social History     Tobacco Use    Smoking status: Never    Smokeless tobacco: Never   Substance Use Topics    Alcohol use: Yes     Alcohol/week: 0.8 standard drinks     Types: 1 Glasses of wine per week        Family History   Problem Relation Age of Onset    Asthma Father     Diabetes Father     Heart Disease Father     Hypertension Father     Hypertension Sister     Diabetes Son        Allergies   Allergen Reactions    Iodine Anaphylaxis    Iodine And Iodide Containing Products Anaphylaxis    Beta-Blockers (Beta-Adrenergic Blocking Agts) Diarrhea     METOPROLOL TARTRATE  DIARRHEA AFTER 3 DOSES. Mold Unknown (comments)    Ragweed Pollen Unknown (comments)    Sulfa (Sulfonamide Antibiotics) Angioedema        Prior to Admission medications    Medication Sig Start Date End Date Taking? Authorizing Provider   verapamil ER (VERELAN) 120 mg ER capsule Take 1 Capsule by mouth in the morning. 8/3/22  Yes Prisca Servin [place] H, NP   levothyroxine (SYNTHROID) 137 mcg tablet Take 1 pill every morning 7 days per week 22  Yes Marilyn Piedra MD   lisinopriL (PRINIVIL, ZESTRIL) 2.5 mg tablet Take 1 Tablet by mouth daily. 22  Yes Marilyn Piedra MD   busPIRone (BUSPAR) 10 mg tablet Take 1 Tablet by mouth three (3) times daily. 22  Yes Marilyn Piedra MD   dicyclomine (BENTYL) 10 mg capsule Take 1 Capsule by mouth three (3) times daily. Indications: irritable colon 22  Yes Marilyn Piedra MD   LORazepam (Ativan) 0.5 mg tablet Take 1 Tablet by mouth every eight (8) hours as needed for Anxiety.  Max Daily Amount: 1.5 mg. 22  Yes Kyle Joshua MD   ALPHA LIPOIC ACID PO Take 2 Tablets by mouth daily. Yes Provider, Historical   turmeric (CURCUMIN) Take 2 Tablets by mouth daily. Yes Provider, Historical   insulin glargine (LANTUS,BASAGLAR) 100 unit/mL (3 mL) inpn Inject 18 units SQ every morning 9/1/21  Yes Deepthi PETERSEN NP   Insulin Needles, Disposable, 31 gauge x 5/16\" ndle Inject daily with insulin up to 4 times daily 9/1/21  Yes Deepthi PETERSEN NP   Blood-Glucose Meter monitoring kit Check sugar up to 5 times daily, DX: E10.40 9/1/21  Yes Deepthi PETERSEN NP   glucose blood VI test strips (ASCENSIA AUTODISC VI, ONE TOUCH ULTRA TEST VI) strip Check sugar up to 5 times daily, DX: E10.40 9/1/21  Yes Deepthi PETERSEN NP   lancets misc Check sugar up to 5 times daily, DX: E10.40 9/1/21  Yes Deepthi PETERSEN NP   insulin lispro (HUMALOG) 100 unit/mL kwikpen Inject three times daily before meals according to sliding scale. Max dose 10 units 9/1/21  Yes Avinash Pozo NP   glucagon 1 mg solr 1 mg by IntraMUSCular route as needed (low blood sugar). 4/7/22   Garret Fuentes NP   cholecalciferol, vitamin D3, (Vitamin D3) 50 mcg (2,000 unit) tab Take 1 Tablet by mouth daily. 9/1/21   Garret Fuentes NP       REVIEW OF SYSTEMS:     I am not able to complete the review of systems because:    The patient is intubated and sedated    The patient has altered mental status due to his acute medical problems    The patient has baseline aphasia from prior stroke(s)    The patient has baseline dementia and is not reliable historian    The patient is in acute medical distress and unable to provide information           Total of 12 systems reviewed as follows:       POSITIVE= bold text  Negative = text not bold  General:  fever, chills, sweats, generalized weakness, weight loss/gain,      loss of appetite   Eyes:    blurred vision, eye pain, loss of vision, double vision  ENT:    rhinorrhea, pharyngitis   Respiratory:   cough, sputum production, SOB, OVIEDO, wheezing, pleuritic pain   Cardiology:   chest pain, palpitations, orthopnea, PND, edema, syncope   Gastrointestinal:  abdominal pain , N/V, diarrhea, dysphagia, constipation, bleeding   Genitourinary:  frequency, urgency, dysuria, hematuria, incontinence   Muskuloskeletal :  arthralgia, myalgia, back pain  Hematology:  easy bruising, nose or gum bleeding, lymphadenopathy   Dermatological: rash, ulceration, pruritis, color change / jaundice  Endocrine:   hot flashes or polydipsia   Neurological:  headache, dizziness, confusion, focal weakness, paresthesia,     Speech difficulties, memory loss, gait difficulty syncope  Psychological: Feelings of anxiety, depression, agitation    Objective:   VITALS:    Visit Vitals  BP (!) 208/75   Pulse (!) 55   Temp 98 °F (36.7 °C)   Resp 18   SpO2 97%       PHYSICAL EXAM:    General:    Alert, cooperative, no distress, appears stated age. Young  female     HEENT: Atraumatic, anicteric sclerae, pink conjunctivae     No oral ulcers, mucosa moist, throat clear, dentition fair  Neck:  Supple, symmetrical,  thyroid: non tender  Lungs:   CTAB. No Wheezing or Rhonchi. No rales. Normal respiratory effort. Chest wall:  No tenderness  No Accessory muscle use. Heart:   RRR,  No  murmur/rub/gallop. No edema. No JVD  Abdomen:   Soft, non-tender. Not distended. Bowel sounds normal. No HSM  Extremities: No cyanosis. No clubbing,      Skin turgor normal, Capillary refill normal, Radial dial pulse 2+  Skin:     Not pale. Not Jaundiced  No rashes   Psych:  Good insight. Not depressed. Not anxious or agitated. Neurologic: EOMs intact. No facial asymmetry. No aphasia or slurred speech. Symmetrical strength, Sensation grossly intact. Alert and oriented X 4.                           No overt focal deficits    LAB DATA REVIEWED:    Recent Results (from the past 24 hour(s))   EKG, 12 LEAD, INITIAL    Collection Time: 08/05/22  3:53 PM   Result Value Ref Range Ventricular Rate 59 BPM    Atrial Rate 59 BPM    P-R Interval 150 ms    QRS Duration 90 ms    Q-T Interval 416 ms    QTC Calculation (Bezet) 411 ms    Calculated R Axis 49 degrees    Calculated T Axis 27 degrees    Diagnosis       Sinus bradycardia  Otherwise normal ECG  When compared with ECG of 02-JUL-2022 00:26,  No significant change was found  Confirmed by Ember Teresa MD, Franklin Lindsey ([id number]) on 8/5/2022 5:40:21 PM     GLUCOSE, POC    Collection Time: 08/05/22  3:58 PM   Result Value Ref Range    Glucose (POC) 192 (H) 65 - 117 mg/dL    Performed by Latia Bush (RN)    CBC WITH AUTOMATED DIFF    Collection Time: 08/05/22  4:00 PM   Result Value Ref Range    WBC 8.3 3.6 - 11.0 K/uL    RBC 3.85 3.80 - 5.20 M/uL    HGB 12.6 11.5 - 16.0 g/dL    HCT 36.2 35.0 - 47.0 %    MCV 94.0 80.0 - 99.0 FL    MCH 32.7 26.0 - 34.0 PG    MCHC 34.8 30.0 - 36.5 g/dL    RDW 11.8 11.5 - 14.5 %    PLATELET 204 812 - 478 K/uL    MPV 10.1 8.9 - 12.9 FL    NRBC 0.0 0  WBC    ABSOLUTE NRBC 0.00 0.00 - 0.01 K/uL    NEUTROPHILS 62 32 - 75 %    LYMPHOCYTES 23 12 - 49 %    MONOCYTES 12 5 - 13 %    EOSINOPHILS 2 0 - 7 %    BASOPHILS 1 0 - 1 %    IMMATURE GRANULOCYTES 0 0.0 - 0.5 %    ABS. NEUTROPHILS 5.2 1.8 - 8.0 K/UL    ABS. LYMPHOCYTES 1.9 0.8 - 3.5 K/UL    ABS. MONOCYTES 1.0 0.0 - 1.0 K/UL    ABS. EOSINOPHILS 0.2 0.0 - 0.4 K/UL    ABS. BASOPHILS 0.1 0.0 - 0.1 K/UL    ABS. IMM.  GRANS. 0.0 0.00 - 0.04 K/UL    DF AUTOMATED     METABOLIC PANEL, COMPREHENSIVE    Collection Time: 08/05/22  4:00 PM   Result Value Ref Range    Sodium 136 136 - 145 mmol/L    Potassium 3.9 3.5 - 5.1 mmol/L    Chloride 99 97 - 108 mmol/L    CO2 29 21 - 32 mmol/L    Anion gap 8 5 - 15 mmol/L    Glucose 185 (H) 65 - 100 mg/dL    BUN 17 6 - 20 MG/DL    Creatinine 0.89 0.55 - 1.02 MG/DL    BUN/Creatinine ratio 19 12 - 20      GFR est AA >60 >60 ml/min/1.73m2    GFR est non-AA >60 >60 ml/min/1.73m2    Calcium 9.1 8.5 - 10.1 MG/DL    Bilirubin, total 0.4 0.2 - 1.0 MG/DL ALT (SGPT) 33 12 - 78 U/L    AST (SGOT) 19 15 - 37 U/L    Alk. phosphatase 79 45 - 117 U/L    Protein, total 6.7 6.4 - 8.2 g/dL    Albumin 3.6 3.5 - 5.0 g/dL    Globulin 3.1 2.0 - 4.0 g/dL    A-G Ratio 1.2 1.1 - 2.2     TROPONIN-HIGH SENSITIVITY    Collection Time: 08/05/22  4:00 PM   Result Value Ref Range    Troponin-High Sensitivity 5 0 - 51 ng/L   PROTHROMBIN TIME + INR    Collection Time: 08/05/22  4:00 PM   Result Value Ref Range    INR 1.2 (H) 0.9 - 1.1      Prothrombin time 11.9 (H) 9.0 - 11.1 sec   PTT    Collection Time: 08/05/22  4:00 PM   Result Value Ref Range    aPTT 24.2 22.1 - 31.0 sec    aPTT, therapeutic range     58.0 - 77.0 SECS       IMAGING:  N/A    EKG: ordered  ______________________________________________________________________    Assessment / Plan:  Syncope  Sinus bradycardia  Frequent PVCs  Hypertension  HAL  Type 1 diabetes mellitus  Hypothyroidism secondary to Hashimoto's thyroiditis    FORMULATION:  59-year-old  female presenting with recurrent syncopal episodes with prodrome consistent with vasovagal syncope found to have sinus bradycardia which may represent hyper vagotonia. Sent to our facility for cardiology evaluation.     PLAN:  Syncope  Sinus bradycardia  Frequent PVCs  Telemetry monitoring  Check BMP, magnesium, CBC  Cardiology consulted, greatly appreciate recommendations    Hypertension  Continue PTA lisinopril  Continue PTA verapamil    HAL  Continue PTA BuSpar    Type 1 diabetes mellitus  Well-controlled on PTA regimen will continue inpatient:  -18 units glargine every morning  -Conservative sliding scale regimen -will adjust as needed    Hypothyroidism secondary to Hashimoto's thyroiditis  Continue PTA Synthroid    Code Status: Full    DVT Prophylaxis: Lovenox  GI Prophylaxis: not indicated  Diet: Cardiac       Care Plan discussed with:    Comments   Patient x    Family      RN     Care Manager                    Consultant: _______________________________________________________________________  Baseline: Independent  Expected  Disposition:   Home with Family x   HH/PT/OT/RN    SNF/LTC    DANE    ________________________________________________________________________  TOTAL TIME:  50 Minutes      Comments    x Reviewed previous records   >50% of visit spent in counseling and coordination of care x Discussion with patient and/or family and questions answered       ________________________________________________________________________  Signed: Ricco Nolasco DO  8/6/2022 1:01 AM    Please note that this dictation was completed with Dragon dictation software. Occasionally unanticipated grammatical, syntax, homophones, and other interpretive errors are inadvertently transcribed by the computer software. Please excuse and disregard any errors that have escaped final proofreading. If in doubt please do not hesitate to reach out to myself or the assigned hospitalist via [facility] paging system for clarification. This note is compiled to communicate with medical care team. It may contain sensitive and protected information. It is not intended to serve as a source of communication with patients/families/friends; that communication occurs at the bedside or via alternative direct communications means (messaging, letters, telephone conversations, etc.).

## 2022-08-10 ENCOUNTER — OFFICE VISIT (OUTPATIENT)
Dept: FAMILY MEDICINE CLINIC | Age: 62
End: 2022-08-10
Payer: COMMERCIAL

## 2022-08-10 VITALS
RESPIRATION RATE: 18 BRPM | HEART RATE: 61 BPM | WEIGHT: 151 LBS | DIASTOLIC BLOOD PRESSURE: 60 MMHG | BODY MASS INDEX: 24.27 KG/M2 | OXYGEN SATURATION: 98 % | HEIGHT: 66 IN | SYSTOLIC BLOOD PRESSURE: 142 MMHG

## 2022-08-10 DIAGNOSIS — Z09 HOSPITAL DISCHARGE FOLLOW-UP: Primary | ICD-10-CM

## 2022-08-10 DIAGNOSIS — E10.40 TYPE 1 DIABETES MELLITUS WITH DIABETIC NEUROPATHY (HCC): Chronic | ICD-10-CM

## 2022-08-10 DIAGNOSIS — R55 VASOVAGAL SYNCOPE: ICD-10-CM

## 2022-08-10 DIAGNOSIS — R00.2 PALPITATIONS: ICD-10-CM

## 2022-08-10 DIAGNOSIS — I10 ESSENTIAL HYPERTENSION: ICD-10-CM

## 2022-08-10 PROCEDURE — 1111F DSCHRG MED/CURRENT MED MERGE: CPT

## 2022-08-10 PROCEDURE — 3051F HG A1C>EQUAL 7.0%<8.0%: CPT

## 2022-08-10 PROCEDURE — 99214 OFFICE O/P EST MOD 30 MIN: CPT

## 2022-08-10 RX ORDER — LISINOPRIL 2.5 MG/1
5 TABLET ORAL DAILY
Qty: 90 TABLET | Refills: 1
Start: 2022-08-10 | End: 2022-08-29

## 2022-08-10 NOTE — PROGRESS NOTES
Chief Complaint   Patient presents with    [facility] for Pvc's ana rosa cardia. DX with Vago vaga syncope. Patient was feeling weird 2 days after her covid vacc       HPI:     is a 57 y.o. female who presents for hospital follow-up. She presented to the ED at [facility] on August 5 after having a brief syncopal episode. She describes sensation of mental fog and blurry vision which she attributed to hypoglycemia and treated with juice and PB; she sat down and felt a little better, but then lost consciousness for several seconds. During her ER visit, she was noted to be bradycardic in the 50s with episodes of bigeminy and frequent PVCs; she was admitted at [facility] overnight without further events. It was recommended that she follow-up with EP. She notes that since discharge her BP continues to be high 160-180s/70-80s so she increased her lisinopril from 2.5mg to 5mg; she notes that BP improved somewhat to 150s/70s. She continues to note HR 60s at home, but palpitations have improved and she has not experienced further episodes of syncope. She notes an increase in nocturnal hypoglycemic episodes over the last few months; denies changes in her insulin dose, but notes that these occur more often when she is more active during the day or if she doesn't have a bedtime snack. Allergies   Allergen Reactions    Iodine Anaphylaxis    Iodine And Iodide Containing Products Anaphylaxis    Beta-Blockers (Beta-Adrenergic Blocking Agts) Diarrhea     METOPROLOL TARTRATE  DIARRHEA AFTER 3 DOSES. Mold Unknown (comments)    Ragweed Pollen Unknown (comments)    Sulfa (Sulfonamide Antibiotics) Angioedema       Current Outpatient Medications   Medication Sig    verapamil ER (VERELAN) 120 mg ER capsule Take 1 Capsule by mouth in the morning.     levothyroxine (SYNTHROID) 137 mcg tablet Take 1 pill every morning 7 days per week    LORazepam (Ativan) 0.5 mg tablet Take 1 Tablet by mouth every eight (8) hours as needed for Anxiety. Max Daily Amount: 1.5 mg. ALPHA LIPOIC ACID PO Take 2 Tablets by mouth daily. turmeric (CURCUMIN) Take 2 Tablets by mouth daily. glucagon 1 mg solr 1 mg by IntraMUSCular route as needed (low blood sugar). insulin glargine (LANTUS,BASAGLAR) 100 unit/mL (3 mL) inpn Inject 18 units SQ every morning    Insulin Needles, Disposable, 31 gauge x 5/16\" ndle Inject daily with insulin up to 4 times daily    Blood-Glucose Meter monitoring kit Check sugar up to 5 times daily, DX: E10.40    glucose blood VI test strips (ASCENSIA AUTODISC VI, ONE TOUCH ULTRA TEST VI) strip Check sugar up to 5 times daily, DX: E10.40    lancets misc Check sugar up to 5 times daily, DX: E10.40    cholecalciferol, vitamin D3, (Vitamin D3) 50 mcg (2,000 unit) tab Take 1 Tablet by mouth daily. insulin lispro (HUMALOG) 100 unit/mL kwikpen Inject three times daily before meals according to sliding scale. Max dose 10 units    lisinopriL (PRINIVIL, ZESTRIL) 2.5 mg tablet Take 1 Tablet by mouth daily. busPIRone (BUSPAR) 10 mg tablet Take 1 Tablet by mouth three (3) times daily. (Patient not taking: Reported on 8/10/2022)    dicyclomine (BENTYL) 10 mg capsule Take 1 Capsule by mouth three (3) times daily. Indications: irritable colon (Patient not taking: Reported on 8/10/2022)     No current facility-administered medications for this visit. Past Medical History:   Diagnosis Date    Diabetes [address] [address]     H/O colonoscopy 04/13/2019    H/O mammogram 02/08/2021    Hypertension     Hypothyroidism due to Hashimoto's thyroiditis 1999       Family History   Problem Relation Age of Onset    Asthma Father     Diabetes Father     Heart Disease Father     Hypertension Father     Hypertension Sister     Diabetes Son        Review of Systems   Constitutional: Negative. Negative for chills, fever and malaise/fatigue. HENT: Negative. Eyes: Negative. Respiratory: Negative. Negative for cough and shortness of breath. Cardiovascular: Negative. Negative for chest pain, palpitations and leg swelling. Gastrointestinal: Negative. Negative for abdominal pain, nausea and vomiting. Genitourinary: Negative. Musculoskeletal: Negative. Skin: Negative. Neurological: Negative. Negative for dizziness and headaches. Endo/Heme/Allergies: Negative. Psychiatric/Behavioral: Negative. Negative for depression. The patient is not nervous/anxious. BP (!) 142/60 (BP 1 Location: Left upper arm, BP Patient Position: Sitting, BP Cuff Size: Adult long)   Pulse 61   Resp 18   Ht 5' 6\" (1.676 m)   Wt 151 lb (68.5 kg)   SpO2 98%   BMI 24.37 kg/m²     Wt Readings from Last 3 Encounters:   08/10/22 151 lb (68.5 kg)   08/03/22 152 lb (68.9 kg)   07/26/22 152 lb 3.2 oz (69 kg)       3 most recent PHQ Screens 8/10/2022   Little interest or pleasure in doing things Not at all   Feeling down, depressed, irritable, or hopeless Not at all   Total Score PHQ 2 0       Physical Exam  Constitutional:       Appearance: Normal appearance. She is normal weight. HENT:      Head: Normocephalic and atraumatic. Eyes:      Extraocular Movements: Extraocular movements intact. Conjunctiva/sclera: Conjunctivae normal.      Pupils: Pupils are equal, round, and reactive to light. Neck:      Vascular: No carotid bruit. Cardiovascular:      Rate and Rhythm: Normal rate and regular rhythm. No extrasystoles are present. Pulses: Normal pulses. Heart sounds: Normal heart sounds, S1 normal and S2 normal. No murmur heard. No friction rub. No gallop. Pulmonary:      Effort: Pulmonary effort is normal.      Breath sounds: Normal breath sounds. No wheezing, rhonchi or rales. Musculoskeletal:      Cervical back: Normal range of motion. Right lower leg: No edema. Left lower leg: No edema. Lymphadenopathy:      Cervical: No cervical adenopathy. Skin:     General: Skin is warm and dry.    Neurological:      General: No focal deficit present. Mental Status: She is alert and oriented to person, place, and time. Psychiatric:         Mood and Affect: Mood normal.         Behavior: Behavior normal.         Thought Content: Thought content normal.         Judgment: Judgment normal.       ASSESSMENT AND PLAN:       ICD-10-CM ICD-9-CM    1. Hospital discharge follow-up  Z09 V67.59 [place] DISCHARGE MEDS RECONCILED W/ CURRENT OUTPATIENT MED LIST      REFERRAL TO CARDIOLOGY      REFERRAL TO CARDIOLOGY      2. Vasovagal syncope  R55 780.2 REFERRAL TO CARDIOLOGY      REFERRAL TO CARDIOLOGY      3. Palpitations  R00.2 785.1 REFERRAL TO CARDIOLOGY      REFERRAL TO CARDIOLOGY      4. Essential hypertension  I10 401.9 REFERRAL TO CARDIOLOGY      REFERRAL TO CARDIOLOGY          Wonder if these symptoms are pointing to diabetic autonomic neuropathy. She self-referred to Dr. Elva Whitaker and would like to follow-up with Deangelo Goodman NP to discuss results of loop monitor. Agree with increase in lisinopril; continue verapamil pending apt with Dr. Elva Whitaker and JENY Maurer. Medication Side Effects and Warnings were discussed with patient:  yes  Patient Labs were reviewed:  yes  Patient Past Records were reviewed:  yes      Patient aware of plan of care and verbalized understanding. Questions answered. RTC 1 month or sooner if needed. On this date 08/10/2022 I have spent 45 minutes reviewing previous notes, test results and face to face with the patient discussing the diagnosis and importance of compliance with the treatment plan as well as documenting on the day of the visit.     Rudolph Francois NP

## 2022-08-10 NOTE — PROGRESS NOTES
Jillian Gilman is a 57 y.o. female presenting for/with:    Chief Complaint   Patient presents with    Hospital Follow Up     [facility] for Pvc's ana rosa cardia. DX with Vago vaga syncope. Patient was feeling weird 2 days after her covid vacc       There were no vitals taken for this visit. Pain Scale: /10  Pain Location:     1. \"Have you been to the ER, urgent care clinic since your last visit? Hospitalized since your last visit? \" Yes [facility]    2. \"Have you seen or consulted any other health care providers outside of the [address] [place] [address] since your last visit? \" No     3. For patients aged 39-70: Has the patient had a colonoscopy / FIT/ Cologuard? Yes - Care Gap present. Most recent result on file      If the patient is female:    4. For patients aged 41-77: Has the patient had a mammogram within the past 2 years? Yes - Care Gap present. Rooming MA/LPN to request most recent results      5. For patients aged 21-65: Has the patient had a pap smear? Yes - Care Gap present.  Rooming MA/LPN to request most recent results      Symptom review:  NO  Fever   NO  Shaking chills  NO  Cough  NO  Body aches  NO  Coughing up blood  NO  Chest congestion  NO  Chest pain  NO  Shortness of breath  NO  Profound Loss of smell/taste  NO  Nausea/Vomiting   NO  Loose stool/Diarrhea  NO  any skin issues    Patient Risk Factors Reviewed as follows:  NO  have you been in Close contact with confirmed COVID19 patient   NO  History of recent travel to affected geographical areas within the past 14 days  NO  COPD  NO  Active Cancer/Leukemia/Lymphoma/Chemotherapy  NO  Oral steroid use  NO  Pregnant  YES  Diabetes Mellitus  YES  Heart disease  NO  Asthma  NO Health care worker at home  [address] care worker  NO Is there a Pregnant Woman in the home  NO Dialysis pt in the home   NO a large number of people living in the home    Learning Assessment 8/10/2022   PRIMARY LEARNER Patient   CO-LEARNER CAREGIVER -   [address] [place] [address] LEARNER PREFERENCE PRIMARY DEMONSTRATION     -   ANSWERED BY self   RELATIONSHIP SELF     Fall Risk Assessment, last 12 mths 8/10/2022   Able to walk? Yes   Fall in past 12 months? 0   Do you feel unsteady? 0   Are you worried about falling 0       3 most recent PHQ Screens 8/10/2022   Little interest or pleasure in doing things Not at all   Feeling down, depressed, irritable, or hopeless Not at all   Total Score PHQ 2 0     Abuse Screening Questionnaire 8/10/2022   Do you ever feel afraid of your partner? N   Are you in a relationship with someone who physically or mentally threatens you? N   Is it safe for you to go home?  Y       ADL Assessment 8/10/2022   Feeding yourself No Help Needed   Getting from bed to chair No Help Needed   Getting dressed No Help Needed   Bathing or showering No Help Needed   Walk across the room (includes cane/walker) No Help Needed   Using the telphone No Help Needed   Taking your medications No Help Needed   Preparing meals No Help Needed   Managing money (expenses/bills) No Help Needed   Moderately strenuous housework (laundry) No Help Needed   Shopping for personal items (toiletries/medicines) No Help Needed   Shopping for groceries No Help Needed   Driving No Help Needed   Climbing a flight of stairs No Help Needed   Getting to places beyond walking distances No Help Needed      Advance Care Planning 7/26/2022   Patient's Healthcare Decision Maker is: Legal Next of Kin   Confirm Advance Directive Yes, not on file

## 2022-08-15 NOTE — PROGRESS NOTES
Donovan Michel is a 57 y.o. female is here for ED follow up. Hx IDDM with neuropathy, hypertension, dyslipidemia, thyroid disease, palpitations, MV prolapse, low back pain w/ sciatica, DJD hips followed by Lidia Troy NP with recent OV/labs. Initially seen by Dr Jolanta Bush in 9/2021: Moved here from [place], previously followed by PCP. Endocrine and Cardiology there. Recent foot pain/issues, prior stress fx and had Xrays which showed vascular calcifications. GIven rx for lovastatin (not taking), recent lipids as noted at target. Normal CBC, CMP, TSH. HbA1c 7.4. Occasional palpitations, had syncopal episode 2 weeks after receiving [email or web address]. Nausea/diaphoresis, weak/dizzy. Had Echo approx 2 yrs ago--reportedly ok. Seen by me in clinic 7/6/2022 for ED follow up where she presented on 7/2/2022 for palpitation and elevated bp. Also c/o anxiety. EMS reports that she was noted to have PVCs on the monitor on the way in. Negative cardiac work up. She was given lorazepam, discharged on low dose beta blocker. At her OV with me,  not taking BB. Still having intermittent palpitation. EKG showed NSR. She was admitted 8/6/2022 for vasovagal syncope. Cardiology consulted    Syncope: appears to be vasovagal based upon clinical evaluation. Recent Echo and previously normal stress test noted. Already has event monitor in place. No significant dysrhythmias noted on monitor. Check orthostatic today. Patient anxious to go home and would prefer to go today. Advised not to drive atleast for now till work up is completed. If decides to leave then follow as out pt. May need EP evaluation. PVC: on CCB. Did not tolerate BB. Clinically feels much better. HTN: on ACEi. Today, states she stopped her Verapamil d/t worsening of her acid reflux---called Saturday, spoke with Dr Marlena Carlton. She has an appointment to see Dr Mariana Chávez on August 23rd to discuss possible EP study.     The patient denies chest pain/ shortness of breath, orthopnea, PND, LE edema, palpitations, syncope, presyncope or fatigue. Patient Active Problem List    Diagnosis Date Noted    Syncope 2022    Hip pain, bilateral 2022    Back muscle spasm 2022    Arrhythmia 2022    Irritable bowel syndrome with diarrhea 2022    Anxiety as acute reaction to exceptional stress 2022    Left knee pain 2022    Hypothyroidism due to Hashimoto's thyroiditis 2021    Palpitations 2021    Chronic right-sided low back pain with bilateral sciatica 2021    History of stress fracture 2021    Type 1 diabetes mellitus with diabetic neuropathy ([facility] [address] 10/18/2018    Essential hypertension 2015      Dejan Rowell NP  Past Medical History:   Diagnosis Date    Diabetes ([facility] [address]     H/O colonoscopy 2019    H/O mammogram 2021    Hypertension     Hypothyroidism due to Hashimoto's thyroiditis       Past Surgical History:   Procedure Laterality Date    HX CARPAL TUNNEL RELEASE      HX  SECTION      HX COLONOSCOPY  2019    recommended 5 year followup colonoscopy     Allergies   Allergen Reactions    Iodine Anaphylaxis    Iodine And Iodide Containing Products Anaphylaxis    Beta-Blockers (Beta-Adrenergic Blocking Agts) Diarrhea     METOPROLOL TARTRATE  DIARRHEA AFTER 3 DOSES.     Mold Unknown (comments)    Ragweed Pollen Unknown (comments)    Sulfa (Sulfonamide Antibiotics) Angioedema      Family History   Problem Relation Age of Onset    Asthma Father     Diabetes Father     Heart Disease Father     Hypertension Father     Hypertension Sister     Diabetes Son       Social History     Socioeconomic History    Marital status:      Spouse name: Not on file    Number of children: Not on file    Years of education: Not on file    Highest education level: Not on file   Occupational History    Not on file   Tobacco Use    Smoking status: Never    Smokeless tobacco: Never   Vaping Use    Vaping Use: Never used   Substance and Sexual Activity    Alcohol use: Yes     Alcohol/week: 0.8 standard drinks     Types: 1 Glasses of wine per week    Drug use: Never    Sexual activity: Yes   Other Topics Concern     Service No    Blood Transfusions No    Caffeine Concern No    Occupational Exposure No    Hobby Hazards No    Sleep Concern No    Stress Concern No    Weight Concern No    Special Diet No    Back Care No    Exercise Yes    Bike Helmet No    Seat Belt Yes    Self-Exams Yes   Social History Narrative    Not on file     Social Determinants of Health     Financial Resource Strain: Not on file   Food Insecurity: Not on file   Transportation Needs: Not on file   Physical Activity: Not on file   Stress: Not on file   Social Connections: Not on file   Intimate Partner Violence: Not on file   Housing Stability: Not on file      Current Outpatient Medications   Medication Sig    lisinopriL (PRINIVIL, ZESTRIL) 2.5 mg tablet Take 2 Tablets by mouth in the morning. verapamil ER (VERELAN) 120 mg ER capsule Take 1 Capsule by mouth in the morning. levothyroxine (SYNTHROID) 137 mcg tablet Take 1 pill every morning 7 days per week    busPIRone (BUSPAR) 10 mg tablet Take 1 Tablet by mouth three (3) times daily. (Patient not taking: Reported on 8/10/2022)    dicyclomine (BENTYL) 10 mg capsule Take 1 Capsule by mouth three (3) times daily. Indications: irritable colon (Patient not taking: Reported on 8/10/2022)    LORazepam (Ativan) 0.5 mg tablet Take 1 Tablet by mouth every eight (8) hours as needed for Anxiety. Max Daily Amount: 1.5 mg. ALPHA LIPOIC ACID PO Take 2 Tablets by mouth daily. turmeric (CURCUMIN) Take 2 Tablets by mouth daily. glucagon 1 mg solr 1 mg by IntraMUSCular route as needed (low blood sugar).     insulin glargine (LANTUS,BASAGLAR) 100 unit/mL (3 mL) inpn Inject 18 units SQ every morning    Insulin Needles, Disposable, 31 gauge x 5/16\" ndle Inject daily with insulin up to 4 times daily    Blood-Glucose Meter monitoring kit Check sugar up to 5 times daily, DX: E10.40    glucose blood VI test strips (ASCENSIA AUTODISC VI, ONE TOUCH ULTRA TEST VI) strip Check sugar up to 5 times daily, DX: E10.40    lancets misc Check sugar up to 5 times daily, DX: E10.40    cholecalciferol, vitamin D3, (Vitamin D3) 50 mcg (2,000 unit) tab Take 1 Tablet by mouth daily. insulin lispro (HUMALOG) 100 unit/mL kwikpen Inject three times daily before meals according to sliding scale. Max dose 10 units     No current facility-administered medications for this visit. Review of Symptoms:    CONST  No weight change. No fever, chills, sweats    ENT No visual changes, URI sx, sore throat    CV  See HPI   RESP  No cough, or sputum, wheezing. Also see HPI   GI  No abdominal pain or change in bowel habits. No heartburn or dysphagia. No melena or rectal bleeding.   No dysuria, urgency, frequency, hematuria   MSKEL  +hip, foot, low back pain   No muscle pain. SKIN  No rash or lesions. NEURO  No headache, syncope, or seizure. No weakness, loss of sensation, or paresthesias. PSYCH  No low mood or depression  No anxiety. HE/LYMPH  No easy bruising, abnormal bleeding, or enlarged glands. Physical ExamPhysical Exam:    There were no vitals taken for this visit. Gen: NAD  HEENT:  PERRL, throat clear  Neck: no adenopathy, no thyromegaly, no JVD   Heart:  Regular,Nl S1S2,  no murmur, gallop or rub. Lungs:  clear  Abdomen:   Soft, non-tender, bowel sounds are active.    Extremities:  No edema  Pulse: symmetric  Neuro: A&O times 3, No focal neuro deficits    Cardiographics    ECG: NSR, PVC      Labs:   Lab Results   Component Value Date/Time    Sodium 136 08/05/2022 04:00 PM    Sodium 139 07/02/2022 12:30 AM    Sodium 131 (L) 03/11/2022 04:14 PM    Sodium 133 (L) 09/15/2021 11:45 AM    Potassium 3.9 08/05/2022 04:00 PM    Potassium 3.9 07/02/2022 12:30 AM Potassium 4.3 03/11/2022 04:14 PM    Potassium 4.3 09/15/2021 11:45 AM    Chloride 99 08/05/2022 04:00 PM    Chloride 100 07/02/2022 12:30 AM    Chloride 97 03/11/2022 04:14 PM    Chloride 97 09/15/2021 11:45 AM    CO2 29 08/05/2022 04:00 PM    CO2 31 07/02/2022 12:30 AM    CO2 30 03/11/2022 04:14 PM    CO2 31 09/15/2021 11:45 AM    Anion gap 8 08/05/2022 04:00 PM    Anion gap 8 07/02/2022 12:30 AM    Anion gap 4 (L) 03/11/2022 04:14 PM    Anion gap 5 09/15/2021 11:45 AM    Glucose 185 (H) 08/05/2022 04:00 PM    Glucose 143 (H) 07/02/2022 12:30 AM    Glucose 175 (H) 03/11/2022 04:14 PM    Glucose 138 (H) 09/15/2021 11:45 AM    BUN 17 08/05/2022 04:00 PM    BUN 13 07/02/2022 12:30 AM    BUN 17 03/11/2022 04:14 PM    BUN 11 09/15/2021 11:45 AM    Creatinine 0.89 08/05/2022 04:00 PM    Creatinine 0.66 07/02/2022 12:30 AM    Creatinine 0.73 03/11/2022 04:14 PM    Creatinine 0.63 09/15/2021 11:45 AM    BUN/Creatinine ratio 19 08/05/2022 04:00 PM    BUN/Creatinine ratio 20 07/02/2022 12:30 AM    BUN/Creatinine ratio 23 (H) 03/11/2022 04:14 PM    BUN/Creatinine ratio 17 09/15/2021 11:45 AM    GFR est AA >60 08/05/2022 04:00 PM    GFR est AA >60 07/02/2022 12:30 AM    GFR est AA >60 03/11/2022 04:14 PM    GFR est AA >60 09/15/2021 11:45 AM    GFR est non-AA >60 08/05/2022 04:00 PM    GFR est non-AA >60 07/02/2022 12:30 AM    GFR est non-AA >60 03/11/2022 04:14 PM    GFR est non-AA >60 09/15/2021 11:45 AM    Calcium 9.1 08/05/2022 04:00 PM    Calcium 8.9 07/02/2022 12:30 AM    Calcium 9.0 03/11/2022 04:14 PM    Calcium 9.1 09/15/2021 11:45 AM    Bilirubin, total 0.4 08/05/2022 04:00 PM    Bilirubin, total 0.3 07/02/2022 12:30 AM    Bilirubin, total 0.4 03/11/2022 04:14 PM    Bilirubin, total 0.5 09/15/2021 11:45 AM    Alk. phosphatase 79 08/05/2022 04:00 PM    Alk. phosphatase 85 07/02/2022 12:30 AM    Alk. phosphatase 73 03/11/2022 04:14 PM    Alk.  phosphatase 72 09/15/2021 11:45 AM    Protein, total 6.7 08/05/2022 04:00 PM Protein, total 7.1 07/02/2022 12:30 AM    Protein, total 6.8 03/11/2022 04:14 PM    Protein, total 7.2 09/15/2021 11:45 AM    Albumin 3.6 08/05/2022 04:00 PM    Albumin 4.0 07/02/2022 12:30 AM    Albumin 3.9 03/11/2022 04:14 PM    Albumin 3.8 09/15/2021 11:45 AM    Globulin 3.1 08/05/2022 04:00 PM    Globulin 3.1 07/02/2022 12:30 AM    Globulin 2.9 03/11/2022 04:14 PM    Globulin 3.4 09/15/2021 11:45 AM    A-G Ratio 1.2 08/05/2022 04:00 PM    A-G Ratio 1.3 07/02/2022 12:30 AM    A-G Ratio 1.3 03/11/2022 04:14 PM    A-G Ratio 1.1 09/15/2021 11:45 AM    ALT (SGPT) 33 08/05/2022 04:00 PM    ALT (SGPT) 34 07/02/2022 12:30 AM    ALT (SGPT) 32 03/11/2022 04:14 PM    ALT (SGPT) 28 09/15/2021 11:45 AM     No results found for: CPK, CPKX, CPX  Lab Results   Component Value Date/Time    Cholesterol, total 159 09/15/2021 11:45 AM    HDL Cholesterol 79 09/15/2021 11:45 AM    LDL, calculated 68.4 09/15/2021 11:45 AM    Triglyceride 58 09/15/2021 11:45 AM    CHOL/HDL Ratio 2.0 09/15/2021 11:45 AM     No results found for this or any previous visit. Assessment:         Patient Active Problem List    Diagnosis Date Noted    Syncope 08/06/2022    Hip pain, bilateral 07/08/2022    Back muscle spasm 07/08/2022    Arrhythmia 07/08/2022    Irritable bowel syndrome with diarrhea 07/08/2022    Anxiety as acute reaction to exceptional stress 07/08/2022    Left knee pain 03/13/2022    Hypothyroidism due to Hashimoto's thyroiditis 09/01/2021    Palpitations 09/01/2021    Chronic right-sided low back pain with bilateral sciatica 09/01/2021    History of stress fracture 09/01/2021    Type 1 diabetes mellitus with diabetic neuropathy ([facility] [address] 10/18/2018    Essential hypertension 04/22/2015      Hx IDDM with neuropathy, hypertension, dyslipidemia, thyroid disease, palpitations, MV prolapse, low back pain w/ sciatica, DJD hips followed by Gilda Wallace NP with recent OV/labs. Moved here from [place], previously followed by PCP.  Endocrine and Cardiology there. Recent foot pain/issues, prior stress fx and had Xrays which showed vascular calcifications. GIven rx for lovastatin (not taking), recent lipids as noted at target. Normal CBC, CMP, TSH. HbA1c 7.4. 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 Nausea/diaphoresis, weak/dizzy. Had Echo approx 2 yrs ago--reportedly ok. 07/25/22    ECHO ADULT COMPLETE 07/28/2022 7/28/2022    Interpretation Summary  Formatting of this result is different from the original.      Left Ventricle: Normal left ventricular systolic function with a visually estimated EF of 55 - 60%. Left ventricle size is normal. Normal wall thickness. Normal wall motion. Normal diastolic function for age. Signed by: Jan Forte MD on 7/28/2022  6:06 PM         Plan:     Intermittent palpitations  PVC (premature ventricular contraction)  Essentially normal echo study on 7/25/2022  elev tsh with normal T4 in 7/2022; CBC/CMP ok  Did not tolerate BB,  today, states she stopped her Verapamil d/t worsening of her acid reflux---called Saturday, spoke with Dr Guerline Tate. She has an appointment to see Dr Andrey Hughes on August 23rd to discuss possible EP study    Recent IP admission for Syncope: appears to be vasovagal based upon clinical evaluation. Recent Echo and previously normal stress test noted. Already has event monitor in place. No significant dysrhythmias noted on monitor. Check orthostatic today. Patient anxious to go home and would prefer to go today. Advised not to drive atleast for now till work up is completed. If decides to leave then follow as out pt. May need EP evaluation. Essential hypertension  Controlled with current therapy  The patient will monitor BP at home and call if generally >140/85.   Serum Cr 0.89 in 8/2022    Dyslipidemia  Prev given rx for lovastatin by PCP, not taking    Hx syncopal episode 2 weeks after receiving Pfizer vacc  Essentially normal echo study on 7/25/2022  Normal 2 week event  9/2021   Carotid duplex 9/2021 with minimal plaque build up  Normal stress EKG in 11/2021      Hx Claudication  Normal MYLA in 9/2021    Type 1 diabetes mellitus with diabetic neuropathy ([address]  On Insulin regimen  Will establish care with endo      RTC on Oct 5 2022    Umesh Barksdale NP

## 2022-08-17 ENCOUNTER — OFFICE VISIT (OUTPATIENT)
Dept: CARDIOLOGY CLINIC | Age: 62
End: 2022-08-17
Payer: COMMERCIAL

## 2022-08-17 VITALS
DIASTOLIC BLOOD PRESSURE: 70 MMHG | TEMPERATURE: 97.1 F | HEIGHT: 66 IN | WEIGHT: 150 LBS | SYSTOLIC BLOOD PRESSURE: 130 MMHG | OXYGEN SATURATION: 97 % | HEART RATE: 68 BPM | BODY MASS INDEX: 24.11 KG/M2 | RESPIRATION RATE: 18 BRPM

## 2022-08-17 DIAGNOSIS — R55 SYNCOPE, UNSPECIFIED SYNCOPE TYPE: ICD-10-CM

## 2022-08-17 DIAGNOSIS — E10.40 TYPE 1 DIABETES MELLITUS WITH DIABETIC NEUROPATHY (HCC): ICD-10-CM

## 2022-08-17 DIAGNOSIS — R00.2 INTERMITTENT PALPITATIONS: ICD-10-CM

## 2022-08-17 DIAGNOSIS — E78.5 DYSLIPIDEMIA: ICD-10-CM

## 2022-08-17 DIAGNOSIS — I10 ESSENTIAL HYPERTENSION: ICD-10-CM

## 2022-08-17 DIAGNOSIS — I49.3 PVC (PREMATURE VENTRICULAR CONTRACTION): Primary | ICD-10-CM

## 2022-08-17 DIAGNOSIS — Z09 HOSPITAL DISCHARGE FOLLOW-UP: ICD-10-CM

## 2022-08-17 PROCEDURE — 93000 ELECTROCARDIOGRAM COMPLETE: CPT | Performed by: INTERNAL MEDICINE

## 2022-08-17 PROCEDURE — 3051F HG A1C>EQUAL 7.0%<8.0%: CPT | Performed by: NURSE PRACTITIONER

## 2022-08-17 PROCEDURE — 99214 OFFICE O/P EST MOD 30 MIN: CPT | Performed by: NURSE PRACTITIONER

## 2022-08-17 PROCEDURE — 1111F DSCHRG MED/CURRENT MED MERGE: CPT | Performed by: NURSE PRACTITIONER

## 2022-08-17 RX ORDER — LISINOPRIL 10 MG/1
10 TABLET ORAL DAILY
COMMUNITY

## 2022-08-17 NOTE — PROGRESS NOTES
Identified pt with two pt identifiers(name and ). Reviewed record in preparation for visit and have obtained necessary documentation. Chief Complaint   Patient presents with    Hospital Follow Up    Hypertension      Vitals:    22 1036   BP: 126/64   Pulse: 68   Resp: 18   Temp: 97.1 °F (36.2 °C)   TempSrc: Temporal   SpO2: 97%   Weight: 150 lb (68 kg)   Height: 5' 6\" (1.676 m)   PainSc:   0 - No pain       Medications reviewed/approved by provider. Health Maintenance Review: Patient reminded of \"due or due soon\" health maintenance. I have asked the patient to contact his/her primary care provider (PCP) for follow-up on his/her health maintenance. Coordination of Care Questionnaire:  :   1) Have you been to an emergency room, urgent care, or hospitalized since your last visit? If yes, where when, and reason for visit? no       2. Have seen or consulted any other health care provider since your last visit? If yes, where when, and reason for visit? NO      Patient is accompanied by self I have received verbal consent from Oni [place] to discuss any/all medical information while they are present in the room.

## 2022-08-20 ENCOUNTER — TELEPHONE (OUTPATIENT)
Dept: CARDIOLOGY CLINIC | Age: 62
End: 2022-08-20

## 2022-08-20 NOTE — TELEPHONE ENCOUNTER
I notified the patient that her event monitor showed 12% PVC and 5% PACs, but no arrhythmias of concern. I told her she could try stopping verapamil if she wants since she states it is causing GERD. If no worsening of PVCs, she will stay off that medicine. We can try another medicine in the future as needed. No callback needed at this time.

## 2022-08-22 NOTE — TELEPHONE ENCOUNTER
Pts event report is ready for  from the office. Pt has been informed. Verified patient with two patient identifiers.

## 2022-08-29 ENCOUNTER — OFFICE VISIT (OUTPATIENT)
Dept: FAMILY MEDICINE CLINIC | Age: 62
End: 2022-08-29
Payer: COMMERCIAL

## 2022-08-29 VITALS
HEIGHT: 66 IN | WEIGHT: 147.3 LBS | RESPIRATION RATE: 18 BRPM | BODY MASS INDEX: 23.67 KG/M2 | SYSTOLIC BLOOD PRESSURE: 150 MMHG | HEART RATE: 40 BPM | OXYGEN SATURATION: 97 % | DIASTOLIC BLOOD PRESSURE: 60 MMHG

## 2022-08-29 DIAGNOSIS — R00.2 PALPITATIONS: ICD-10-CM

## 2022-08-29 DIAGNOSIS — F43.0 ANXIETY AS ACUTE REACTION TO EXCEPTIONAL STRESS: ICD-10-CM

## 2022-08-29 DIAGNOSIS — M25.551 HIP PAIN, BILATERAL: ICD-10-CM

## 2022-08-29 DIAGNOSIS — E10.40 TYPE 1 DIABETES MELLITUS WITH DIABETIC NEUROPATHY (HCC): ICD-10-CM

## 2022-08-29 DIAGNOSIS — I10 ESSENTIAL HYPERTENSION: ICD-10-CM

## 2022-08-29 DIAGNOSIS — F41.1 ANXIETY AS ACUTE REACTION TO EXCEPTIONAL STRESS: ICD-10-CM

## 2022-08-29 DIAGNOSIS — Z09 HOSPITAL DISCHARGE FOLLOW-UP: Primary | ICD-10-CM

## 2022-08-29 DIAGNOSIS — M25.552 HIP PAIN, BILATERAL: ICD-10-CM

## 2022-08-29 PROCEDURE — 3051F HG A1C>EQUAL 7.0%<8.0%: CPT

## 2022-08-29 PROCEDURE — 99214 OFFICE O/P EST MOD 30 MIN: CPT

## 2022-08-29 PROCEDURE — 1111F DSCHRG MED/CURRENT MED MERGE: CPT

## 2022-08-29 RX ORDER — LORAZEPAM 0.5 MG/1
0.5 TABLET ORAL
Qty: 20 TABLET | Refills: 0 | Status: SHIPPED | OUTPATIENT
Start: 2022-08-29

## 2022-08-29 NOTE — PROGRESS NOTES
Claudia Chavez is a 57 y.o. female presenting for/with:    Chief Complaint   Patient presents with    Hospital Follow Up     Patient was having tachycardia. Patient needs referral for endocrine, cardiology, and hip specialist       Visit Vitals  BP (!) 150/60 (BP 1 Location: Left upper arm, BP Patient Position: Sitting, BP Cuff Size: Adult long)   Pulse (!) 40   Resp 18   Ht 5' 6\" (1.676 m)   Wt 147 lb 4.8 oz (66.8 kg)   LMP  (LMP Unknown)   SpO2 97%   BMI 23.77 kg/m²     Pain Scale: /10  Pain Location:     1. \"Have you been to the ER, urgent care clinic since your last visit? Hospitalized since your last visit? \" Yes Where: [facility]    2. \"Have you seen or consulted any other health care providers outside of the [address] [place] [address] since your last visit? \" Yes Reason for visit: Tachycardia      3. For patients aged 39-70: Has the patient had a colonoscopy / FIT/ Cologuard? Yes - no Care Gap present      If the patient is female:    4. For patients aged 41-77: Has the patient had a mammogram within the past 2 years? Yes - no Care Gap present      5. For patients aged 21-65: Has the patient had a pap smear?  Yes - no Care Gap present      Symptom review:  NO  Fever   NO  Shaking chills  NO  Cough  NO  Body aches  NO  Coughing up blood  NO  Chest congestion  NO  Chest pain  NO  Shortness of breath  NO  Profound Loss of smell/taste  NO  Nausea/Vomiting   NO  Loose stool/Diarrhea  NO  any skin issues    Patient Risk Factors Reviewed as follows:  NO  have you been in Close contact with confirmed COVID19 patient   NO  History of recent travel to affected geographical areas within the past 14 days  NO  COPD  NO  Active Cancer/Leukemia/Lymphoma/Chemotherapy  NO  Oral steroid use  NO  Pregnant  YES  Diabetes Mellitus  YES  Heart disease  NO  Asthma  NO Health care worker at home  [address] care worker  NO Is there a Pregnant Woman in the home  NO Dialysis pt in the home   NO a large number of people living in the home    Learning Assessment 8/29/2022   PRIMARY LEARNER Patient   CO-LEARNER CAREGIVER -   PRIMARY LANGUAGE ENGLISH   LEARNER PREFERENCE PRIMARY DEMONSTRATION     -   ANSWERED BY self   RELATIONSHIP SELF     Fall Risk Assessment, last 12 mths 8/29/2022   Able to walk? Yes   Fall in past 12 months? 0   Do you feel unsteady? 0   Are you worried about falling 0       3 most recent PHQ Screens 8/29/2022   Little interest or pleasure in doing things Not at all   Feeling down, depressed, irritable, or hopeless Not at all   Total Score PHQ 2 0     Abuse Screening Questionnaire 8/29/2022   Do you ever feel afraid of your partner? N   Are you in a relationship with someone who physically or mentally threatens you? N   Is it safe for you to go home?  Y       ADL Assessment 8/29/2022   Feeding yourself No Help Needed   Getting from bed to chair No Help Needed   Getting dressed No Help Needed   Bathing or showering No Help Needed   Walk across the room (includes cane/walker) No Help Needed   Using the telphone No Help Needed   Taking your medications No Help Needed   Preparing meals No Help Needed   Managing money (expenses/bills) No Help Needed   Moderately strenuous housework (laundry) No Help Needed   Shopping for personal items (toiletries/medicines) No Help Needed   Shopping for groceries No Help Needed   Driving No Help Needed   Climbing a flight of stairs No Help Needed   Getting to places beyond walking distances No Help Needed      Advance Care Planning 7/26/2022   Patient's Healthcare Decision Maker is: Legal Next of Kin   Confirm Advance Directive Yes, not on file

## 2022-08-29 NOTE — PROGRESS NOTES
Chief Complaint   Patient presents with    Hospital Follow Up     Patient was having tachycardia. Patient needs referral for endocrine, cardiology, and hip specialist       HPI:     is a 57 y.o. female who presents for hospital follow-up and requesting referrals. She explains that she went to her local rescue squad building for a blood pressure check; they placed her on a cardiac monitor and noted multiple PVCs and recommended that she go to the ER. She was transported to [facility] ER on 8/26 and found to have multiple PVCs in a bi/tri/quadrigeminy pattern; otherwise, negative workup. She notes that she saw Dr. Hany Hermosillo who plans to perform cardiac ablation but she is unsure if this is the right thing to do; she has scheduled an appt for second opinion with Dr. Yolande Escalona at [address] [place] [address] in [facility]. She has an appt scheduled for today with Dr. Virginia Ramos at [facility]. She has been seeing an endocrinologist in [facility], but wants to switch to someone closer; she would like a referral for insurance purposes. She saw Dr. Becky Ritchie, spine specialist, at [address] [facility] earlier this month for ongoing R>L hip pain and sciatica; he felt the pain was primarily hip rather than coming from the back and referred her back to her primary orthopedist Dr. Vidhi Garg. She has appt with Dr. Christina West next month and would like referral for insurance purposes. Home BP have been erratic and we have discussed this on several occasions in the past; I recommend she check her BP once daily at most, one hour after taking her medication and when a in a relaxed physical and mental state. She admits to a lot of anxiety around her health; on occasion  been taked Ativan 0.5mg prescribed in the ER 2 months ago which is helpful.     Allergies   Allergen Reactions    Iodine Anaphylaxis    Iodine And Iodide Containing Products Anaphylaxis    Beta-Blockers (Beta-Adrenergic Blocking Agts) Diarrhea     METOPROLOL TARTRATE  DIARRHEA AFTER 3 DOSES. Mold Unknown (comments)    Ragweed Pollen Unknown (comments)    Sulfa (Sulfonamide Antibiotics) Angioedema       Current Outpatient Medications   Medication Sig    LORazepam (Ativan) 0.5 mg tablet Take 1 Tablet by mouth daily as needed for Anxiety. lisinopriL (PRINIVIL, ZESTRIL) 10 mg tablet Take 10 mg by mouth daily. Take 1 by mouth in the Morning    levothyroxine (SYNTHROID) 137 mcg tablet Take 1 pill every morning 7 days per week    ALPHA LIPOIC ACID PO Take 2 Tablets by mouth daily. glucagon 1 mg solr 1 mg by IntraMUSCular route as needed (low blood sugar). insulin glargine (LANTUS,BASAGLAR) 100 unit/mL (3 mL) inpn Inject 18 units SQ every morning    Insulin Needles, Disposable, 31 gauge x 5/16\" ndle Inject daily with insulin up to 4 times daily    Blood-Glucose Meter monitoring kit Check sugar up to 5 times daily, DX: E10.40    glucose blood VI test strips (ASCENSIA AUTODISC VI, ONE TOUCH ULTRA TEST VI) strip Check sugar up to 5 times daily, DX: E10.40    lancets misc Check sugar up to 5 times daily, DX: E10.40    cholecalciferol, vitamin D3, (Vitamin D3) 50 mcg (2,000 unit) tab Take 1 Tablet by mouth daily. insulin lispro (HUMALOG) 100 unit/mL kwikpen Inject three times daily before meals according to sliding scale. Max dose 10 units     No current facility-administered medications for this visit. Past Medical History:   Diagnosis Date    Diabetes ([facility] [address]     H/O colonoscopy 04/13/2019    H/O mammogram 02/08/2021    Hypertension     Hypothyroidism due to Hashimoto's thyroiditis 1999       Family History   Problem Relation Age of Onset    Asthma Father     Diabetes Father     Heart Disease Father     Hypertension Father     Hypertension Sister     Diabetes Son        Review of Systems   Constitutional: Negative. Negative for chills, fever and malaise/fatigue. HENT: Negative. Eyes: Negative. Respiratory: Negative.   Negative for cough and shortness of breath. Cardiovascular:  Positive for palpitations. Negative for chest pain and leg swelling. Gastrointestinal: Negative. Negative for abdominal pain, nausea and vomiting. Genitourinary: Negative. Musculoskeletal: Negative. Skin: Negative. Neurological: Negative. Negative for dizziness and headaches. Endo/Heme/Allergies: Negative. Psychiatric/Behavioral:  Negative for depression. The patient is nervous/anxious. BP (!) 150/60 (BP 1 Location: Left upper arm, BP Patient Position: Sitting, BP Cuff Size: Adult long)   Pulse (!) 40   Resp 18   Ht 5' 6\" (1.676 m)   Wt 147 lb 4.8 oz (66.8 kg)   LMP  (LMP Unknown)   SpO2 97%   BMI 23.77 kg/m²     Wt Readings from Last 3 Encounters:   08/29/22 147 lb 4.8 oz (66.8 kg)   08/17/22 150 lb (68 kg)   08/10/22 151 lb (68.5 kg)       3 most recent PHQ Screens 8/29/2022   Little interest or pleasure in doing things Not at all   Feeling down, depressed, irritable, or hopeless Not at all   Total Score PHQ 2 0       Physical Exam  Constitutional:       General: She is not in acute distress. Appearance: Normal appearance. She is normal weight. HENT:      Head: Normocephalic and atraumatic. Eyes:      Extraocular Movements: Extraocular movements intact. Conjunctiva/sclera: Conjunctivae normal.      Pupils: Pupils are equal, round, and reactive to light. Cardiovascular:      Rate and Rhythm: Normal rate and regular rhythm. FrequentExtrasystoles are present. Heart sounds: Normal heart sounds, S1 normal and S2 normal. No murmur heard. No friction rub. No gallop. Pulmonary:      Effort: Pulmonary effort is normal.      Breath sounds: Normal breath sounds. Musculoskeletal:         General: Normal range of motion. Skin:     General: Skin is warm and dry. Neurological:      General: No focal deficit present. Mental Status: She is alert and oriented to person, place, and time.    Psychiatric:         Mood and Affect: Mood normal.         Behavior: Behavior normal.         Thought Content: Thought content normal.         Judgment: Judgment normal.       ASSESSMENT AND PLAN:       ICD-10-CM ICD-9-CM    1. Hospital discharge follow-up  Z09 V67.59 [place] DISCHARGE MEDS RECONCILED W/ CURRENT OUTPATIENT MED LIST      2. Type 1 diabetes mellitus with diabetic neuropathy (HCC)  E10.40 250.61 REFERRAL TO ENDOCRINOLOGY     357.2       3. Hip pain, bilateral  M25.551 719.45 REFERRAL TO ORTHOPEDICS    M25.552        4. Palpitations  R00.2 785.1 REFERRAL TO CARDIOLOGY      LORazepam (Ativan) 0.5 mg tablet      5. Essential hypertension  I10 401.9 REFERRAL TO CARDIOLOGY      6. Anxiety as acute reaction to exceptional stress  F41.1 308.0 LORazepam (Ativan) 0.5 mg tablet    F43.0            SBP above goal today, DBP 60; discussed treatment goal, SEs of over-treatment--continue lisinopril as prescribed. Referrals placed for endocrinology, cardiology, and orthopedics as requested. Discussed use of Ativan for anxiety symptoms; I do not recommend this for long-term treatment and will prescribe for short-term therapy of health-related anxiety. Medication Side Effects and Warnings were discussed with patient:  yes  Patient Labs were reviewed:  yes  Patient Past Records were reviewed:  yes      Patient aware of plan of care and verbalized understanding. Questions answered. RTC 3 months or sooner if needed. On this date 08/29/2022 I have spent 30 minutes reviewing previous notes, test results and face to face with the patient discussing the diagnosis and importance of compliance with the treatment plan as well as documenting on the day of the visit.     Karmen Neff NP

## 2022-09-22 PROCEDURE — 74011000250 HC RX REV CODE- 250: Performed by: INTERNAL MEDICINE

## 2022-09-23 ENCOUNTER — HOSPITAL ENCOUNTER (OUTPATIENT)
Age: 62
Discharge: HOME | End: 2022-09-23
Attending: INTERNAL MEDICINE | Admitting: INTERNAL MEDICINE
Payer: COMMERCIAL

## 2022-09-23 ENCOUNTER — ANESTHESIA EVENT (OUTPATIENT)
Dept: CARDIAC CATH/INVASIVE PROCEDURES | Age: 62
End: 2022-09-23
Payer: COMMERCIAL

## 2022-09-23 ENCOUNTER — ANESTHESIA (OUTPATIENT)
Dept: CARDIAC CATH/INVASIVE PROCEDURES | Age: 62
End: 2022-09-23
Payer: COMMERCIAL

## 2022-09-23 VITALS
RESPIRATION RATE: 15 BRPM | OXYGEN SATURATION: 97 % | WEIGHT: 145 LBS | DIASTOLIC BLOOD PRESSURE: 49 MMHG | TEMPERATURE: 97.6 F | SYSTOLIC BLOOD PRESSURE: 153 MMHG | BODY MASS INDEX: 23.3 KG/M2 | HEART RATE: 55 BPM | HEIGHT: 66 IN

## 2022-09-23 DIAGNOSIS — I49.3 PVC (PREMATURE VENTRICULAR CONTRACTION): ICD-10-CM

## 2022-09-23 LAB
GLUCOSE BLD STRIP.AUTO-MCNC: 169 MG/DL (ref 65–117)
GLUCOSE BLD STRIP.AUTO-MCNC: 171 MG/DL (ref 65–117)
GLUCOSE BLD STRIP.AUTO-MCNC: 207 MG/DL (ref 65–117)
SERVICE CMNT-IMP: ABNORMAL

## 2022-09-23 PROCEDURE — 74011000250 HC RX REV CODE- 250: Performed by: INTERNAL MEDICINE

## 2022-09-23 PROCEDURE — 77030016894 HC CBL EP DX CATH3 STJU -B: Performed by: INTERNAL MEDICINE

## 2022-09-23 PROCEDURE — C1893 INTRO/SHEATH, FIXED,NON-PEEL: HCPCS | Performed by: INTERNAL MEDICINE

## 2022-09-23 PROCEDURE — 2709999900 HC NON-CHARGEABLE SUPPLY: Performed by: INTERNAL MEDICINE

## 2022-09-23 PROCEDURE — C1730 CATH, EP, 19 OR FEW ELECT: HCPCS | Performed by: INTERNAL MEDICINE

## 2022-09-23 PROCEDURE — 74011250636 HC RX REV CODE- 250/636: Performed by: INTERNAL MEDICINE

## 2022-09-23 PROCEDURE — 74011250636 HC RX REV CODE- 250/636

## 2022-09-23 PROCEDURE — 76060000033 HC ANESTHESIA 1 TO 1.5 HR: Performed by: INTERNAL MEDICINE

## 2022-09-23 PROCEDURE — 77030015398 HC CBL EP EXT STJU -C: Performed by: INTERNAL MEDICINE

## 2022-09-23 PROCEDURE — C1732 CATH, EP, DIAG/ABL, 3D/VECT: HCPCS | Performed by: INTERNAL MEDICINE

## 2022-09-23 PROCEDURE — 77030010880 HC CBL EP SUPRME STJU -C: Performed by: INTERNAL MEDICINE

## 2022-09-23 PROCEDURE — 77030035291 HC TBNG PMP SMARTABLATE J&J -B: Performed by: INTERNAL MEDICINE

## 2022-09-23 PROCEDURE — 77030027107 HC PTCH EXT REF CARTO3 J&J -F: Performed by: INTERNAL MEDICINE

## 2022-09-23 PROCEDURE — 74011000258 HC RX REV CODE- 258: Performed by: NURSE ANESTHETIST, CERTIFIED REGISTERED

## 2022-09-23 PROCEDURE — 74011250636 HC RX REV CODE- 250/636: Performed by: NURSE ANESTHETIST, CERTIFIED REGISTERED

## 2022-09-23 PROCEDURE — 93654 COMPRE EP EVAL TX VT: CPT | Performed by: INTERNAL MEDICINE

## 2022-09-23 PROCEDURE — 77030018729 HC ELECTRD DEFIB PAD CARD -B: Performed by: INTERNAL MEDICINE

## 2022-09-23 PROCEDURE — C1894 INTRO/SHEATH, NON-LASER: HCPCS | Performed by: INTERNAL MEDICINE

## 2022-09-23 PROCEDURE — 82962 GLUCOSE BLOOD TEST: CPT

## 2022-09-23 RX ORDER — ONDANSETRON 2 MG/ML
INJECTION INTRAMUSCULAR; INTRAVENOUS AS NEEDED
Status: DISCONTINUED | OUTPATIENT
Start: 2022-09-23 | End: 2022-09-23 | Stop reason: HOSPADM

## 2022-09-23 RX ORDER — SODIUM CHLORIDE 0.9 % (FLUSH) 0.9 %
5-40 SYRINGE (ML) INJECTION EVERY 8 HOURS
Status: DISCONTINUED | OUTPATIENT
Start: 2022-09-23 | End: 2022-09-23 | Stop reason: HOSPADM

## 2022-09-23 RX ORDER — FENTANYL CITRATE 50 UG/ML
INJECTION, SOLUTION INTRAMUSCULAR; INTRAVENOUS AS NEEDED
Status: DISCONTINUED | OUTPATIENT
Start: 2022-09-23 | End: 2022-09-23 | Stop reason: HOSPADM

## 2022-09-23 RX ORDER — MIDAZOLAM HYDROCHLORIDE 1 MG/ML
INJECTION, SOLUTION INTRAMUSCULAR; INTRAVENOUS AS NEEDED
Status: DISCONTINUED | OUTPATIENT
Start: 2022-09-23 | End: 2022-09-23 | Stop reason: HOSPADM

## 2022-09-23 RX ORDER — SODIUM CHLORIDE 0.9 % (FLUSH) 0.9 %
5-40 SYRINGE (ML) INJECTION AS NEEDED
Status: DISCONTINUED | OUTPATIENT
Start: 2022-09-23 | End: 2022-09-23 | Stop reason: HOSPADM

## 2022-09-23 RX ORDER — SODIUM CHLORIDE 9 MG/ML
INJECTION, SOLUTION INTRAVENOUS
Status: DISCONTINUED | OUTPATIENT
Start: 2022-09-23 | End: 2022-09-23 | Stop reason: HOSPADM

## 2022-09-23 RX ORDER — HYDROCODONE BITARTRATE AND ACETAMINOPHEN 5; 325 MG/1; MG/1
1 TABLET ORAL
Status: DISCONTINUED | OUTPATIENT
Start: 2022-09-23 | End: 2022-09-23 | Stop reason: HOSPADM

## 2022-09-23 RX ORDER — DEXAMETHASONE SODIUM PHOSPHATE 4 MG/ML
INJECTION, SOLUTION INTRA-ARTICULAR; INTRALESIONAL; INTRAMUSCULAR; INTRAVENOUS; SOFT TISSUE AS NEEDED
Status: DISCONTINUED | OUTPATIENT
Start: 2022-09-23 | End: 2022-09-23 | Stop reason: HOSPADM

## 2022-09-23 RX ORDER — ACETAMINOPHEN 325 MG/1
650 TABLET ORAL
Status: DISCONTINUED | OUTPATIENT
Start: 2022-09-23 | End: 2022-09-23 | Stop reason: HOSPADM

## 2022-09-23 RX ORDER — PROPOFOL 10 MG/ML
INJECTION, EMULSION INTRAVENOUS AS NEEDED
Status: DISCONTINUED | OUTPATIENT
Start: 2022-09-23 | End: 2022-09-23 | Stop reason: HOSPADM

## 2022-09-23 RX ORDER — HEPARIN SODIUM 200 [USP'U]/100ML
INJECTION, SOLUTION INTRAVENOUS
Status: COMPLETED | OUTPATIENT
Start: 2022-09-23 | End: 2022-09-23

## 2022-09-23 RX ORDER — LIDOCAINE HYDROCHLORIDE 10 MG/ML
INJECTION INFILTRATION; PERINEURAL AS NEEDED
Status: DISCONTINUED | OUTPATIENT
Start: 2022-09-23 | End: 2022-09-23 | Stop reason: HOSPADM

## 2022-09-23 RX ORDER — PROPOFOL 10 MG/ML
INJECTION, EMULSION INTRAVENOUS
Status: DISCONTINUED | OUTPATIENT
Start: 2022-09-23 | End: 2022-09-23 | Stop reason: HOSPADM

## 2022-09-23 RX ORDER — PHENYLEPHRINE HCL IN 0.9% NACL 0.4MG/10ML
SYRINGE (ML) INTRAVENOUS
Status: DISCONTINUED | OUTPATIENT
Start: 2022-09-23 | End: 2022-09-23 | Stop reason: HOSPADM

## 2022-09-23 RX ADMIN — SODIUM CHLORIDE: 9 INJECTION, SOLUTION INTRAVENOUS at 10:11

## 2022-09-23 RX ADMIN — FENTANYL CITRATE 25 MCG: 50 INJECTION, SOLUTION INTRAMUSCULAR; INTRAVENOUS at 10:31

## 2022-09-23 RX ADMIN — FENTANYL CITRATE 50 MCG: 50 INJECTION, SOLUTION INTRAMUSCULAR; INTRAVENOUS at 11:18

## 2022-09-23 RX ADMIN — PROPOFOL 40 MG: 10 INJECTION, EMULSION INTRAVENOUS at 10:15

## 2022-09-23 RX ADMIN — DEXAMETHASONE SODIUM PHOSPHATE 4 MG: 4 INJECTION, SOLUTION INTRAMUSCULAR; INTRAVENOUS at 10:20

## 2022-09-23 RX ADMIN — Medication 40 MCG/MIN: at 10:28

## 2022-09-23 RX ADMIN — PROPOFOL 75 MCG/KG/MIN: 10 INJECTION, EMULSION INTRAVENOUS at 10:15

## 2022-09-23 RX ADMIN — MIDAZOLAM HYDROCHLORIDE 2 MG: 1 INJECTION, SOLUTION INTRAMUSCULAR; INTRAVENOUS at 10:15

## 2022-09-23 RX ADMIN — PROPOFOL 20 MG: 10 INJECTION, EMULSION INTRAVENOUS at 10:27

## 2022-09-23 RX ADMIN — ONDANSETRON HYDROCHLORIDE 4 MG: 2 INJECTION, SOLUTION INTRAMUSCULAR; INTRAVENOUS at 11:23

## 2022-09-23 RX ADMIN — FENTANYL CITRATE 25 MCG: 50 INJECTION, SOLUTION INTRAMUSCULAR; INTRAVENOUS at 10:15

## 2022-09-23 NOTE — PROGRESS NOTES
Ambulate with pt in lindo to BR. Gait steady. Bilateral groin dressings dry and intact. Pt denies c/o. DC instructions reviewed with pt and . Both verbalize understanding. SL dc'd without difficulty. Pt wheeled to front door to be driven  home by .

## 2022-09-23 NOTE — Clinical Note
TRANSFER - OUT REPORT:     Verbal report given to: recovery. Report consisted of patient's Situation, Background, Assessment and   Recommendations(SBAR). Opportunity for questions and clarification was provided. Patient transported with a Registered Nurse and [address] [place] [address] / [facility] [employer]. Patient transported to: recovery.

## 2022-09-23 NOTE — ANESTHESIA PREPROCEDURE EVALUATION
Relevant Problems   CARDIOVASCULAR   (+) Arrhythmia   (+) Essential hypertension      ENDOCRINE   (+) Hypothyroidism due to Hashimoto's thyroiditis   (+) Type 1 diabetes mellitus with diabetic neuropathy (HCC)       Anesthetic History   No history of anesthetic complications            Review of Systems / Medical History  Patient summary reviewed, nursing notes reviewed and pertinent labs reviewed    Pulmonary  Within defined limits                 Neuro/Psych         Psychiatric history     Cardiovascular    Hypertension        Dysrhythmias : PVC      Exercise tolerance: >4 METS     GI/Hepatic/Renal  Within defined limits              Endo/Other    Diabetes: type 1, using insulin  Hypothyroidism       Other Findings              Physical Exam    Airway  Mallampati: I  TM Distance: 4 - 6 cm  Neck ROM: normal range of motion   Mouth opening: Normal     Cardiovascular    Rhythm: regular  Rate: normal         Dental  No notable dental hx       Pulmonary  Breath sounds clear to auscultation               Abdominal  GI exam deferred       Other Findings            Anesthetic Plan    ASA: 3  Anesthesia type: MAC          Induction: Intravenous  Anesthetic plan and risks discussed with: Patient      Took 10 units of basal insulin this morning (normal 16 units). Will plan for BG check immediately pre and post procedure as patient reports that she has frequent lows and response is unpredictable.

## 2022-09-23 NOTE — PROGRESS NOTES
TRANSFER - IN REPORT:    Verbal report received from JENNY Ma and Griffin Clements CRNA on [employer]  being received from EP for routine progression of care. Report consisted of patients Situation, Background, Assessment and Recommendations(SBAR). Information from the following report(s) Procedure Summary and MAR was reviewed with the receiving clinician. Opportunity for questions and clarification was provided. Assessment completed upon patients arrival to [address] [place] [address] and care assumed. Cardiac Cath Lab Recovery Arrival Note:    [employer] arrived to [facility] recovery area. Patient procedure= PVC ablation. Patient on cardiac monitor, non-invasive blood pressure, SPO2 monitor. On room air. Patient status doing well without problems. Patient is A&Ox 3. Patient reports no c/o. PROCEDURE SITE CHECK:    Procedure site:without any bleeding and no hematoma, no pain/discomfort reported at procedure site. No change in patient status. Continue to monitor patient and status.

## 2022-09-23 NOTE — Clinical Note
TRANSFER - IN REPORT:     Verbal report received from: recovery. Report consisted of patient's Situation, Background, Assessment and   Recommendations(SBAR). Opportunity for questions and clarification was provided. Assessment completed upon patient's arrival to unit and care assumed. Patient transported with a Registered Nurse and [address] [place] [address] / [facility] [employer].

## 2022-09-23 NOTE — ANESTHESIA POSTPROCEDURE EVALUATION
Post-Anesthesia Evaluation and Assessment    Patient: Dora Hilton MRN: [id number]  SSN: [id number]    YOB: 1960  Age: 57 y.o. Sex: female      I have evaluated the patient and they are stable and ready for discharge from the PACU. Cardiovascular Function/Vital Signs  Visit Vitals  BP (!) 127/48 (BP 1 Location: Left arm, BP Patient Position: At rest)   Pulse (!) 50   Temp 36.4 °C (97.6 °F)   Resp 11   Ht 5' 6\" (1.676 m)   Wt 65.8 kg (145 lb)   SpO2 99%   Breastfeeding No   BMI 23.40 kg/m²       Patient is status post MAC anesthesia for Procedure(s):  ABLATION PVC. Nausea/Vomiting: None    Postoperative hydration reviewed and adequate. Pain:  Pain Scale 1: Numeric (0 - 10) (09/23/22 1144)  Pain Intensity 1: 0 (09/23/22 1144)   Managed    Neurological Status: At baseline    Mental Status, Level of Consciousness: Alert and  oriented to person, place, and time    Pulmonary Status:   O2 Device: None (Room air) (09/23/22 1144)   Adequate oxygenation and airway patent    Complications related to anesthesia: None    Post-anesthesia assessment completed.  No concerns    Signed By: Marlin Lomeli DO     September 23, 2022

## 2022-09-23 NOTE — DISCHARGE INSTRUCTIONS
[facility]  [address]  [address]  [phone]  [facility]. [facility]   PVC ABLATION DISCHARGE INSTRUCTIONS    Patient ID:  Kwaku Hatch  [id number]  30 y.o.  1960    Admit Date: 9/23/2022    Discharge Date: 9/23/2022     Admitting Physician: [unfilled]     Discharge Physician: Peri Solorio MD    Admission Diagnoses:   PVC (premature ventricular contraction) [I49.3]    Discharge Diagnoses: Active Problems:    PVC (premature ventricular contraction) (9/23/2022)        Discharge Condition: Good    Cardiology Procedures this Admission:  PVC ablation. Disposition: home    Reference discharge instructions provided by nursing for diet and activity. Follow-up with Dr Dayami Munguia or his Nurse Practitioners in 1-2 weeks. Call [phone] to make an appointment. Signed:  Peri Solorio MD  9/23/2022  11:08 AM    S/P PVC ABLATION DISCHARGE INSTRUCTIONS    It is normal to feel tired the first couple days. Take it easy and follow the physicians instructions. CHECK THE CATHETER INSERTION SITE DAILY:  You may shower 24 hours after the procedure, remove the bandage during showering. Wash with soap and water and pat dry. Gentle cleaning of the site with soap and water is sufficient, cover with a dry clean dressing or bandage. Do not apply creams or powders to the area. Do not sit in a bathtub or pool of water for 7 days or until wound has completely healed. Temporary bruising and discomfort is normal and may last a few weeks. You may have a  formation of a small lump at the site which may last up to 6 weeks. CALL THE PHYSICIAN:  If the site becomes red, swollen or feels warm to the touch  If there is bleeding or drainage or if there is unusual pain at the groin or down the leg. If there is any bleeding, lie down, apply pressure or have someone apply pressure with a clean cloth until the bleeding stops.   If the bleeding continues, call 911 to be transported to the hospital.  DO NOT DRIVE YOURSELF, Evangelina Salcedo. Activity:      For the first 24-48 hours or as instructed by the physician:  No lifting, pushing or pulling over 5 pounds and no straining the insertion site. Do not lift grocery bags or the garbage can, do not run the vacuum  or  for 7 days. Start with short walks as in the hospital and gradually increase as tolerated each day. It is recommended to walk 30 minutes 5-7 days per week. Follow your physicians instructions on activity. Avoid walking outside in extremes of heat or cold. Walk inside when it is cold and windy or hot and humid. Things to keep in mind:  No driving for at least 5 days, or as designated by your physician. Limit the number of times you go up and down the stairs  Take rests and pace yourself with activity. Be careful and do not strain with bowel movements. Medications: Take all medications as prescribed  Call your physician if you have any questions  Keep an updated list of your medications with you at all times and give a list to your physician and pharmacist    Signs and Symptoms:  Be cautious of symptoms of angina or recurrent symptoms such as chest discomfort, unusual shortness of breath or fatigue, palpitations. After Care: Follow up with your physician as instructed. Follow a heart healthy diet with proper portion control, daily stress management, daily exercise, blood pressure and cholesterol control , and smoking cessation.

## 2022-09-29 LAB — HBA1C MFR BLD HPLC: 8.9 %

## 2022-11-07 ENCOUNTER — TELEPHONE (OUTPATIENT)
Dept: FAMILY MEDICINE CLINIC | Age: 62
End: 2022-11-07

## 2022-11-07 DIAGNOSIS — E10.40 TYPE 1 DIABETES MELLITUS WITH DIABETIC NEUROPATHY (HCC): Primary | ICD-10-CM

## 2022-11-07 NOTE — TELEPHONE ENCOUNTER
Pt would like referral for [facility]  Ophthalmologist. Phone # [phone], Fax [phone]. [place] be going [place] [facility].

## 2022-11-22 ENCOUNTER — OFFICE VISIT (OUTPATIENT)
Dept: FAMILY MEDICINE CLINIC | Age: 62
End: 2022-11-22
Payer: COMMERCIAL

## 2022-11-22 VITALS
HEIGHT: 66 IN | DIASTOLIC BLOOD PRESSURE: 72 MMHG | BODY MASS INDEX: 24.49 KG/M2 | OXYGEN SATURATION: 98 % | WEIGHT: 152.4 LBS | RESPIRATION RATE: 20 BRPM | HEART RATE: 72 BPM | SYSTOLIC BLOOD PRESSURE: 133 MMHG

## 2022-11-22 DIAGNOSIS — I10 ESSENTIAL HYPERTENSION: ICD-10-CM

## 2022-11-22 DIAGNOSIS — M25.561 ACUTE PAIN OF RIGHT KNEE: Primary | ICD-10-CM

## 2022-11-22 DIAGNOSIS — I49.8 OTHER CARDIAC ARRHYTHMIA: ICD-10-CM

## 2022-11-22 PROCEDURE — 3078F DIAST BP <80 MM HG: CPT

## 2022-11-22 PROCEDURE — 3074F SYST BP LT 130 MM HG: CPT

## 2022-11-22 PROCEDURE — 99214 OFFICE O/P EST MOD 30 MIN: CPT

## 2022-11-22 NOTE — PROGRESS NOTES
Chief Complaint   Patient presents with    Follow-up     Patient needs referral for cardiologist, retina Doctor, and ortheopedic       HPI:     is a 57 y.o. female who presents with several concerns. She underwent ablation in September with Dr. Trudi Hernández for symptomatic PVCs; she continues to experience occasional palpitations, but notes that the episodes are less frequent and milder. She is concerned that her SBP is occasionally elevated 150-160, though her DBP is always normal in the 60s; she notices that her BP is especially affected by stress. She has an appointment with Dr. Tommy Spears and needs a referral for insurance purposes. She is starting PT next week for right labral tear causing hip pain; she is under the care of Dr. Felice Roberts in [place] for this. She notes some right knee pain ongoing for several weeks; previous history of left knee tendinosis for which she received hyaluronic acid injections which were helpful and she wonders if this is appropriate treatment for her right knee. She has an appointment with Dr. Mekhi Valdivia in January and needs a referral for insurance purposes. Continues to follow with her endocrinologist; last A1C up to 8.9; she believes this is due to extra stress of recent health problems and inability to exercise as much a previously. Allergies   Allergen Reactions    Iodine Anaphylaxis    Iodine And Iodide Containing Products Anaphylaxis    Beta-Blockers (Beta-Adrenergic Blocking Agts) Diarrhea     METOPROLOL TARTRATE  DIARRHEA AFTER 3 DOSES. Mold Unknown (comments)    Ragweed Pollen Unknown (comments)    Sulfa (Sulfonamide Antibiotics) Angioedema       Current Outpatient Medications   Medication Sig    lisinopriL (PRINIVIL, ZESTRIL) 10 mg tablet Take 10 mg by mouth daily. Take 1 by mouth in the Morning    levothyroxine (SYNTHROID) 137 mcg tablet Take 1 pill every morning 7 days per week    ALPHA LIPOIC ACID PO Take 2 Tablets by mouth daily. glucagon 1 mg solr 1 mg by IntraMUSCular route as needed (low blood sugar). insulin glargine (LANTUS,BASAGLAR) 100 unit/mL (3 mL) inpn Inject 18 units SQ every morning    Insulin Needles, Disposable, 31 gauge x 5/16\" ndle Inject daily with insulin up to 4 times daily    Blood-Glucose Meter monitoring kit Check sugar up to 5 times daily, DX: E10.40    glucose blood VI test strips (ASCENSIA AUTODISC VI, ONE TOUCH ULTRA TEST VI) strip Check sugar up to 5 times daily, DX: E10.40    lancets misc Check sugar up to 5 times daily, DX: E10.40    cholecalciferol, vitamin D3, (Vitamin D3) 50 mcg (2,000 unit) tab Take 1 Tablet by mouth daily. insulin lispro (HUMALOG) 100 unit/mL kwikpen Inject three times daily before meals according to sliding scale. Max dose 10 units    LORazepam (Ativan) 0.5 mg tablet Take 1 Tablet by mouth daily as needed for Anxiety. No current facility-administered medications for this visit. Past Medical History:   Diagnosis Date    Diabetes ([address]     H/O colonoscopy 04/13/2019    H/O mammogram 02/08/2021    Hypertension     Hypothyroidism due to Hashimoto's thyroiditis 1999       Family History   Problem Relation Age of Onset    Asthma Father     Diabetes Father     Heart Disease Father     Hypertension Father     Hypertension Sister     Diabetes Son        Review of Systems   Constitutional:  Negative for chills, fever and malaise/fatigue. Cardiovascular:  Negative for chest pain, palpitations and leg swelling. Musculoskeletal:  Positive for joint pain. Negative for back pain and myalgias. Neurological:  Negative for dizziness and headaches. Psychiatric/Behavioral:  Negative for depression. The patient is not nervous/anxious.        /72   Pulse 72   Resp 20   Ht 5' 6\" (1.676 m)   Wt 152 lb 6.4 oz (69.1 kg)   SpO2 98%   BMI 24.60 kg/m²     Wt Readings from Last 3 Encounters:   11/22/22 152 lb 6.4 oz (69.1 kg)   09/23/22 145 lb (65.8 kg)   08/29/22 147 lb 4.8 oz (66.8 kg)       3 most recent PHQ Screens 8/29/2022   Little interest or pleasure in doing things Not at all   Feeling down, depressed, irritable, or hopeless Not at all   Total Score PHQ 2 0       Physical Exam  Vitals and nursing note reviewed. Constitutional:       General: She is not in acute distress. Appearance: Normal appearance. HENT:      Head: Normocephalic and atraumatic. Mouth/Throat:      Mouth: Mucous membranes are moist.      Pharynx: Oropharynx is clear. Eyes:      Extraocular Movements: Extraocular movements intact. Conjunctiva/sclera: Conjunctivae normal.      Pupils: Pupils are equal, round, and reactive to light. Cardiovascular:      Rate and Rhythm: Normal rate and regular rhythm. Pulses: Normal pulses. Heart sounds: Normal heart sounds. No murmur heard. No friction rub. No gallop. Pulmonary:      Effort: Pulmonary effort is normal.      Breath sounds: Normal breath sounds. No wheezing, rhonchi or rales. Musculoskeletal:         General: Normal range of motion. Skin:     General: Skin is warm and dry. Neurological:      General: No focal deficit present. Mental Status: She is alert and oriented to person, place, and time. Mental status is at baseline. Psychiatric:         Mood and Affect: Mood normal.         Behavior: Behavior normal.         Thought Content: Thought content normal.         Judgment: Judgment normal.       ASSESSMENT AND PLAN:       ICD-10-CM ICD-9-CM    1. Acute pain of right knee  M25.561 719.46 REFERRAL TO ORTHOPEDICS      2. Other cardiac arrhythmia  I49.8 427.89 REFERRAL TO CARDIOLOGY      3. Essential hypertension  I10 401.9 REFERRAL TO CARDIOLOGY          Referral placed as requested. SBP above goal today, DBP 60, normal upon repeat; discussed treatment goal, SEs of over-treatment--continue lisinopril as prescribed.       Medication Side Effects and Warnings were discussed with patient:  yes  Patient Labs were reviewed: yes  Patient Past Records were reviewed:  yes      Patient aware of plan of care and verbalized understanding. Questions answered. RTC 3 months or sooner if needed. On this date 11/22/2022 I have spent 30 minutes reviewing previous notes, test results and face to face with the patient discussing the diagnosis and importance of compliance with the treatment plan as well as documenting on the day of the visit.     Keshav Fofana NP

## 2022-12-20 NOTE — ED NOTES
Pt has been accepted to [facility], waiting for bed assignment. Nursing Supervisor aware. Unknown if ever smoked

## 2023-01-04 ENCOUNTER — OFFICE VISIT (OUTPATIENT)
Dept: FAMILY MEDICINE CLINIC | Age: 63
End: 2023-01-04
Payer: MEDICARE

## 2023-01-04 VITALS
WEIGHT: 151.4 LBS | HEART RATE: 77 BPM | RESPIRATION RATE: 20 BRPM | DIASTOLIC BLOOD PRESSURE: 66 MMHG | SYSTOLIC BLOOD PRESSURE: 138 MMHG | TEMPERATURE: 98.1 F | BODY MASS INDEX: 24.33 KG/M2 | OXYGEN SATURATION: 98 % | HEIGHT: 66 IN

## 2023-01-04 DIAGNOSIS — R00.2 INTERMITTENT PALPITATIONS: ICD-10-CM
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
## 2023-01-04 DIAGNOSIS — M79.5: ICD-10-CM

## 2023-01-04 DIAGNOSIS — S20.462A TICK BITE OF LEFT UPPER BACK EXCLUDING SCAPULAR REGION, INITIAL ENCOUNTER: Primary | ICD-10-CM

## 2023-01-04 DIAGNOSIS — Z18.39: ICD-10-CM

## 2023-01-04 PROCEDURE — 10120 INC&RMVL FB SUBQ TISS SMPL: CPT

## 2023-01-04 PROCEDURE — 3075F SYST BP GE 130 - 139MM HG: CPT

## 2023-01-04 PROCEDURE — 3078F DIAST BP <80 MM HG: CPT

## 2023-01-04 PROCEDURE — 99213 OFFICE O/P EST LOW 20 MIN: CPT

## 2023-01-04 RX ORDER — DOXYCYCLINE 100 MG/1
200 CAPSULE ORAL ONCE
Qty: 2 CAPSULE | Refills: 0 | Status: SHIPPED | OUTPATIENT
Start: 2023-01-04 | End: 2023-01-04

## 2023-01-04 NOTE — PROGRESS NOTES
1. \"Have you been to the ER, urgent care clinic since your last visit? Hospitalized since your last visit? \" No    2. \"Have you seen or consulted any other health care providers outside of the [address] [place] [address] since your last visit? \" No     3. For patients aged 39-70: Has the patient had a colonoscopy / FIT/ Cologuard? Yes - no Care Gap present      If the patient is female:    4. For patients aged 41-77: Has the patient had a mammogram within the past 2 years? Yes - no Care Gap present      5. For patients aged 21-65: Has the patient had a pap smear?  NA - based on age or sex    Chief Complaint   Patient presents with    Tick Removal     Tick bite patient thinks the head in still in     Visit Vitals  /66 (BP 1 Location: Left upper arm, BP Patient Position: Sitting, BP Cuff Size: Adult)   Pulse 77   Temp 98.1 °F (36.7 °C)   Resp 20   Ht 5' 5.5\" (1.664 m)   Wt 151 lb 6.4 oz (68.7 kg)   SpO2 98%   BMI 24.81 kg/m²

## 2023-01-04 NOTE — PROGRESS NOTES
Chief Complaint   Patient presents with    Tick Removal     Tick bite patient thinks the head in still in       HPI:     is a 57 y.o. female who presents for an acute visit. She reports finding a tick on her right upper back that she noticed about 3 days ago; her  tried removing the tick, but was unable to remove it intact and some parts remained behind. The area is slightly red and itchy; she denies typical \"bulls-eye\" rash, myalgia, fever, or chills. She reports fewer and fewer episodes of palpitations since having ablation with Dr. Dona Wilhelm in September; she feels like her anxiety has resolved now that she is no longer having these episodes. Allergies   Allergen Reactions    Iodine Anaphylaxis    Iodine And Iodide Containing Products Anaphylaxis    Beta-Blockers (Beta-Adrenergic Blocking Agts) Diarrhea     METOPROLOL TARTRATE  DIARRHEA AFTER 3 DOSES. Mold Unknown (comments)    Ragweed Pollen Unknown (comments)    Sulfa (Sulfonamide Antibiotics) Angioedema       Current Outpatient Medications   Medication Sig    doxycycline (MONODOX) 100 mg capsule Take 2 Capsules by mouth once for 1 dose. LORazepam (Ativan) 0.5 mg tablet Take 1 Tablet by mouth daily as needed for Anxiety. lisinopriL (PRINIVIL, ZESTRIL) 10 mg tablet Take 10 mg by mouth daily. Take 1 by mouth in the Morning    levothyroxine (SYNTHROID) 137 mcg tablet Take 1 pill every morning 7 days per week    ALPHA LIPOIC ACID PO Take 2 Tablets by mouth daily. glucagon 1 mg solr 1 mg by IntraMUSCular route as needed (low blood sugar).     insulin glargine (LANTUS,BASAGLAR) 100 unit/mL (3 mL) inpn Inject 18 units SQ every morning    Insulin Needles, Disposable, 31 gauge x 5/16\" ndle Inject daily with insulin up to 4 times daily    Blood-Glucose Meter monitoring kit Check sugar up to 5 times daily, DX: E10.40    glucose blood VI test strips (ASCENSIA AUTODISC VI, ONE TOUCH ULTRA TEST VI) strip Check sugar up to 5 times daily, DX: E10.40    lancets misc Check sugar up to 5 times daily, DX: E10.40    cholecalciferol, vitamin D3, (Vitamin D3) 50 mcg (2,000 unit) tab Take 1 Tablet by mouth daily. insulin lispro (HUMALOG) 100 unit/mL kwikpen Inject three times daily before meals according to sliding scale. Max dose 10 units     No current facility-administered medications for this visit. Past Medical History:   Diagnosis Date    Diabetes ([address]     H/O colonoscopy 04/13/2019    H/O mammogram 02/08/2021    Hypertension     Hypothyroidism due to Hashimoto's thyroiditis 1999       Family History   Problem Relation Age of Onset    Asthma Father     Diabetes Father     Heart Disease Father     Hypertension Father     Hypertension Sister     Diabetes Son        Review of Systems   Constitutional:  Negative for chills, fever and malaise/fatigue. Cardiovascular:  Negative for chest pain, palpitations and leg swelling. Musculoskeletal:  Negative for myalgias. Skin:         See HPI   Neurological:  Negative for dizziness and headaches. Psychiatric/Behavioral:  The patient is not nervous/anxious. /66 (BP 1 Location: Left upper arm, BP Patient Position: Sitting, BP Cuff Size: Adult)   Pulse 77   Temp 98.1 °F (36.7 °C)   Resp 20   Ht 5' 5.5\" (1.664 m)   Wt 151 lb 6.4 oz (68.7 kg)   LMP  (Approximate)   SpO2 98%   BMI 24.81 kg/m²     Wt Readings from Last 3 Encounters:   01/04/23 151 lb 6.4 oz (68.7 kg)   11/22/22 152 lb 6.4 oz (69.1 kg)   09/23/22 145 lb (65.8 kg)       3 most recent PHQ Screens 8/29/2022   Little interest or pleasure in doing things Not at all   Feeling down, depressed, irritable, or hopeless Not at all   Total Score PHQ 2 0       Physical Exam  Constitutional:       General: She is not in acute distress. Appearance: Normal appearance. She is normal weight. HENT:      Head: Normocephalic and atraumatic. Eyes:      Extraocular Movements: Extraocular movements intact.       Conjunctiva/sclera: Conjunctivae normal.      Pupils: Pupils are equal, round, and reactive to light. Cardiovascular:      Rate and Rhythm: Normal rate and regular rhythm. Pulses: Normal pulses. Heart sounds: Normal heart sounds. No murmur heard. No friction rub. No gallop. Pulmonary:      Effort: Pulmonary effort is normal.   Skin:     Comments: 3-4mm erythematous scabbed area on left upper back with presence of presumed tick parts    Neurological:      General: No focal deficit present. Mental Status: She is alert and oriented to person, place, and time. Psychiatric:         Mood and Affect: Mood normal.         Behavior: Behavior normal.         Thought Content: Thought content normal.         Judgment: Judgment normal.       ASSESSMENT AND PLAN:       ICD-10-CM ICD-9-CM    1. Tick bite of left upper back excluding scapular region, initial encounter  S20.462A 911.4 doxycycline (MONODOX) 100 mg capsule    W57. Rochele Ping Q777.3 REMOVE FOREIGN BODY SIMPLE      2. Retained tick parts of upper back excluding scapular region  M79.5 729.6 REMOVE FOREIGN BODY SIMPLE    Z18.39 V90.39       3. Intermittent palpitations  R00.2 785.1         Prophylactic dose of doxycycline to cover for Lyme. Time out performed immediately prior to procedure:    Chart reviewed for the following:    *  Patient identified by name and   *  Agreement on procedure being performed  *  Risks and Benefits explained to the patient. *  Procedure site verified and marked as needed  *  Patient positioned for comfort  *  Consent was signed and verified    Time:  1130  Date of Procedure:  2023  Procedure performed by Rudolph Francois NP  Assistant:  Melburn Channel MA  Patient tolerated procedure well  Post procedural pain scale:  0 - no hurt  Comments:  none    Procedure: Foreign body removal on left upper back     Consent: signed, on chart. Lesion and surrounding skin cleansed with chlorhexadine swabs x 3.  Local anesthesia was offered but patient declined. Scabbed area was unroofed and tick body parts were removed using hemostats. Wound was covered with dry bandaid. EBL: nil. Complications: none. Disposition: Pt tolerated well. Reviewed wound care instructions. Change dressing PRN. RTC PRN for f/u. Medication Side Effects and Warnings were discussed with patient:  yes  Patient Labs were reviewed:  yes  Patient Past Records were reviewed:  yes      Patient aware of plan of care and verbalized understanding. Questions answered. RTC PRN or sooner if needed. On this date 01/04/2023 I have spent 30 minutes reviewing previous notes, test results and face to face with the patient discussing the diagnosis and importance of compliance with the treatment plan as well as documenting on the day of the visit.     Rudolph Francois NP

## 2023-05-03 ENCOUNTER — OFFICE VISIT (OUTPATIENT)
Dept: FAMILY MEDICINE CLINIC | Age: 63
End: 2023-05-03
Payer: MEDICARE

## 2023-05-03 VITALS
BODY MASS INDEX: 25.07 KG/M2 | DIASTOLIC BLOOD PRESSURE: 68 MMHG | RESPIRATION RATE: 17 BRPM | WEIGHT: 156 LBS | HEART RATE: 75 BPM | OXYGEN SATURATION: 96 % | SYSTOLIC BLOOD PRESSURE: 138 MMHG | HEIGHT: 66 IN

## 2023-05-03 DIAGNOSIS — S16.1XXA CERVICAL MYOFASCIAL STRAIN, INITIAL ENCOUNTER: Primary | ICD-10-CM

## 2023-05-03 PROCEDURE — 1090F PRES/ABSN URINE INCON ASSESS: CPT

## 2023-05-03 PROCEDURE — 1123F ACP DISCUSS/DSCN MKR DOCD: CPT

## 2023-05-03 PROCEDURE — G8400 PT W/DXA NO RESULTS DOC: HCPCS

## 2023-05-03 PROCEDURE — 3075F SYST BP GE 130 - 139MM HG: CPT

## 2023-05-03 PROCEDURE — G8417 CALC BMI ABV UP PARAM F/U: HCPCS

## 2023-05-03 PROCEDURE — 3017F COLORECTAL CA SCREEN DOC REV: CPT

## 2023-05-03 PROCEDURE — G8536 NO DOC ELDER MAL SCRN: HCPCS

## 2023-05-03 PROCEDURE — G8432 DEP SCR NOT DOC, RNG: HCPCS

## 2023-05-03 PROCEDURE — G8427 DOCREV CUR MEDS BY ELIG CLIN: HCPCS

## 2023-05-03 PROCEDURE — 99213 OFFICE O/P EST LOW 20 MIN: CPT

## 2023-05-03 PROCEDURE — 3078F DIAST BP <80 MM HG: CPT

## 2023-05-03 PROCEDURE — 1101F PT FALLS ASSESS-DOCD LE1/YR: CPT

## 2023-05-03 RX ORDER — MELOXICAM 7.5 MG/1
7.5 TABLET ORAL DAILY
Qty: 60 TABLET | Refills: 0 | Status: SHIPPED | OUTPATIENT
Start: 2023-05-03

## 2023-05-03 RX ORDER — CYCLOBENZAPRINE HCL 10 MG
10 TABLET ORAL
Qty: 30 TABLET | Refills: 0 | Status: SHIPPED | OUTPATIENT
Start: 2023-05-03

## 2023-05-17 RX ORDER — LORAZEPAM 0.5 MG/1
0.5 TABLET ORAL DAILY PRN
COMMUNITY
Start: 2022-08-29

## 2023-05-17 RX ORDER — INSULIN GLARGINE 100 [IU]/ML
INJECTION, SOLUTION SUBCUTANEOUS
COMMUNITY
Start: 2021-09-01

## 2023-05-17 RX ORDER — MELOXICAM 7.5 MG/1
7.5 TABLET ORAL DAILY
COMMUNITY
Start: 2023-05-03 | End: 2023-07-07

## 2023-05-17 RX ORDER — LEVOTHYROXINE SODIUM 137 UG/1
TABLET ORAL
COMMUNITY
Start: 2022-07-08

## 2023-05-17 RX ORDER — LISINOPRIL 10 MG/1
10 TABLET ORAL DAILY
COMMUNITY

## 2023-05-17 RX ORDER — LANCETS 30 GAUGE
EACH MISCELLANEOUS
COMMUNITY
Start: 2021-09-01

## 2023-05-17 RX ORDER — CYCLOBENZAPRINE HCL 10 MG
10 TABLET ORAL 3 TIMES DAILY PRN
COMMUNITY
Start: 2023-05-03

## 2023-06-05 LAB — HBA1C MFR BLD HPLC: 8.2 %

## 2023-06-29 ENCOUNTER — OFFICE VISIT (OUTPATIENT)
Age: 63
End: 2023-06-29
Payer: MEDICARE

## 2023-06-29 VITALS
OXYGEN SATURATION: 98 % | DIASTOLIC BLOOD PRESSURE: 67 MMHG | RESPIRATION RATE: 20 BRPM | HEIGHT: 66 IN | BODY MASS INDEX: 24.73 KG/M2 | TEMPERATURE: 97.9 F | HEART RATE: 72 BPM | WEIGHT: 153.9 LBS | SYSTOLIC BLOOD PRESSURE: 148 MMHG

## 2023-06-29 DIAGNOSIS — J01.90 ACUTE BACTERIAL SINUSITIS: Primary | ICD-10-CM

## 2023-06-29 DIAGNOSIS — B96.89 ACUTE BACTERIAL SINUSITIS: Primary | ICD-10-CM

## 2023-06-29 DIAGNOSIS — E10.40 TYPE 1 DIABETES MELLITUS WITH DIABETIC NEUROPATHY, UNSPECIFIED (HCC): ICD-10-CM

## 2023-06-29 DIAGNOSIS — I73.9 PERIPHERAL VASCULAR DISEASE, UNSPECIFIED (HCC): ICD-10-CM

## 2023-06-29 PROCEDURE — 2022F DILAT RTA XM EVC RTNOPTHY: CPT | Performed by: NURSE PRACTITIONER

## 2023-06-29 PROCEDURE — 3046F HEMOGLOBIN A1C LEVEL >9.0%: CPT | Performed by: NURSE PRACTITIONER

## 2023-06-29 PROCEDURE — 3017F COLORECTAL CA SCREEN DOC REV: CPT | Performed by: NURSE PRACTITIONER

## 2023-06-29 PROCEDURE — 1036F TOBACCO NON-USER: CPT | Performed by: NURSE PRACTITIONER

## 2023-06-29 PROCEDURE — G8400 PT W/DXA NO RESULTS DOC: HCPCS | Performed by: NURSE PRACTITIONER

## 2023-06-29 PROCEDURE — 3074F SYST BP LT 130 MM HG: CPT | Performed by: NURSE PRACTITIONER

## 2023-06-29 PROCEDURE — 3078F DIAST BP <80 MM HG: CPT | Performed by: NURSE PRACTITIONER

## 2023-06-29 PROCEDURE — G8419 CALC BMI OUT NRM PARAM NOF/U: HCPCS | Performed by: NURSE PRACTITIONER

## 2023-06-29 PROCEDURE — 1090F PRES/ABSN URINE INCON ASSESS: CPT | Performed by: NURSE PRACTITIONER

## 2023-06-29 PROCEDURE — G8427 DOCREV CUR MEDS BY ELIG CLIN: HCPCS | Performed by: NURSE PRACTITIONER

## 2023-06-29 PROCEDURE — 1123F ACP DISCUSS/DSCN MKR DOCD: CPT | Performed by: NURSE PRACTITIONER

## 2023-06-29 PROCEDURE — 99213 OFFICE O/P EST LOW 20 MIN: CPT | Performed by: NURSE PRACTITIONER

## 2023-06-29 RX ORDER — AZITHROMYCIN 250 MG/1
250 TABLET, FILM COATED ORAL SEE ADMIN INSTRUCTIONS
Qty: 6 TABLET | Refills: 0 | Status: SHIPPED | OUTPATIENT
Start: 2023-06-29 | End: 2023-07-04

## 2023-06-29 ASSESSMENT — PATIENT HEALTH QUESTIONNAIRE - PHQ9
SUM OF ALL RESPONSES TO PHQ9 QUESTIONS 1 & 2: 0
SUM OF ALL RESPONSES TO PHQ QUESTIONS 1-9: 0
SUM OF ALL RESPONSES TO PHQ QUESTIONS 1-9: 0
1. LITTLE INTEREST OR PLEASURE IN DOING THINGS: 0
SUM OF ALL RESPONSES TO PHQ QUESTIONS 1-9: 0
SUM OF ALL RESPONSES TO PHQ QUESTIONS 1-9: 0
2. FEELING DOWN, DEPRESSED OR HOPELESS: 0

## 2023-07-01 PROBLEM — I73.9 PERIPHERAL VASCULAR DISEASE, UNSPECIFIED (HCC): Status: ACTIVE | Noted: 2023-07-01

## 2023-07-04 DIAGNOSIS — S16.1XXA STRAIN OF MUSCLE, FASCIA AND TENDON AT NECK LEVEL, INITIAL ENCOUNTER: ICD-10-CM

## 2023-07-06 NOTE — TELEPHONE ENCOUNTER
Last OV 5/3/2023    Requested Prescriptions     Pending Prescriptions Disp Refills    meloxicam (MOBIC) 7.5 MG tablet [Pharmacy Med Name: MELOXICAM 7.5 MG TABLET] 60 tablet 1     Sig: TAKE 1 TABLET BY MOUTH EVERY DAY

## 2023-07-07 RX ORDER — MELOXICAM 7.5 MG/1
TABLET ORAL
Qty: 60 TABLET | Refills: 1 | Status: SHIPPED | OUTPATIENT
Start: 2023-07-07

## 2023-08-31 ENCOUNTER — TELEPHONE (OUTPATIENT)
Age: 63
End: 2023-08-31

## 2023-08-31 ENCOUNTER — NURSE ONLY (OUTPATIENT)
Age: 63
End: 2023-08-31
Payer: MEDICARE

## 2023-08-31 DIAGNOSIS — I49.8 OTHER SPECIFIED CARDIAC ARRHYTHMIAS: ICD-10-CM

## 2023-08-31 DIAGNOSIS — I73.9 PERIPHERAL VASCULAR DISEASE, UNSPECIFIED (HCC): ICD-10-CM

## 2023-08-31 DIAGNOSIS — E10.40 TYPE 1 DIABETES MELLITUS WITH DIABETIC NEUROPATHY, UNSPECIFIED (HCC): Primary | ICD-10-CM

## 2023-08-31 DIAGNOSIS — I10 ESSENTIAL (PRIMARY) HYPERTENSION: ICD-10-CM

## 2023-08-31 DIAGNOSIS — E03.8 OTHER SPECIFIED HYPOTHYROIDISM: ICD-10-CM

## 2023-08-31 DIAGNOSIS — M83.9 OSTEOMALACIA: ICD-10-CM

## 2023-08-31 PROCEDURE — 36415 COLL VENOUS BLD VENIPUNCTURE: CPT | Performed by: NURSE PRACTITIONER

## 2023-08-31 NOTE — PROGRESS NOTES
Labs drawn in Rt arm per [facility] orders. Patient tolerated well, 3 SST, 2 lavenders and 1 urine specimen to send 1 grey top and 2 yellow top tubes  for UA tests per Elizabeth from [facility] [employer].

## 2023-08-31 NOTE — TELEPHONE ENCOUNTER
STORM Johnson, to confirm correct orders of labs? Per Elizabeth, the albumin should be done as a misce order using that [id number] LC order sheet number in a yellow tube, and the A1c+Glycomark Profile + should be done as a misce order as well using the code from the [facility] order sheet [id number] with  1 lavender 1 SST. The orders were sent as requested.

## 2023-09-01 LAB
ALBUMIN SERPL-MCNC: 3.7 G/DL (ref 3.5–5)
ALBUMIN/GLOB SERPL: 1.3 (ref 1.1–2.2)
ALP SERPL-CCNC: 79 U/L (ref 45–117)
ALT SERPL-CCNC: 24 U/L (ref 12–78)
ANION GAP SERPL CALC-SCNC: 3 MMOL/L (ref 5–15)
APPEARANCE UR: CLEAR
AST SERPL-CCNC: 13 U/L (ref 15–37)
BACTERIA URNS QL MICRO: NEGATIVE /HPF
BASOPHILS # BLD: 0.1 K/UL (ref 0–0.1)
BASOPHILS NFR BLD: 1 % (ref 0–1)
BILIRUB SERPL-MCNC: 0.6 MG/DL (ref 0.2–1)
BILIRUB UR QL: NEGATIVE
BUN SERPL-MCNC: 11 MG/DL (ref 6–20)
BUN/CREAT SERPL: 14 (ref 12–20)
CALCIUM SERPL-MCNC: 8.8 MG/DL (ref 8.5–10.1)
CHLORIDE SERPL-SCNC: 103 MMOL/L (ref 97–108)
CHOLEST SERPL-MCNC: 158 MG/DL
CO2 SERPL-SCNC: 31 MMOL/L (ref 21–32)
COLOR UR: ABNORMAL
CREAT SERPL-MCNC: 0.8 MG/DL (ref 0.55–1.02)
CRP SERPL HS-MCNC: 2 MG/L
DIFFERENTIAL METHOD BLD: ABNORMAL
EOSINOPHIL # BLD: 0.1 K/UL (ref 0–0.4)
EOSINOPHIL NFR BLD: 2 % (ref 0–7)
EPITH CASTS URNS QL MICRO: ABNORMAL /LPF
ERYTHROCYTE [DISTWIDTH] IN BLOOD BY AUTOMATED COUNT: 12.6 % (ref 11.5–14.5)
GLOBULIN SER CALC-MCNC: 2.8 G/DL (ref 2–4)
GLUCOSE SERPL-MCNC: 171 MG/DL (ref 65–100)
GLUCOSE UR STRIP.AUTO-MCNC: NEGATIVE MG/DL
HCT VFR BLD AUTO: 43.7 % (ref 35–47)
HDLC SERPL-MCNC: 92 MG/DL
HDLC SERPL: 1.7 (ref 0–5)
HGB BLD-MCNC: 13.7 G/DL (ref 11.5–16)
HGB UR QL STRIP: NEGATIVE
HYALINE CASTS URNS QL MICRO: ABNORMAL /LPF (ref 0–5)
IMM GRANULOCYTES # BLD AUTO: 0 K/UL (ref 0–0.04)
IMM GRANULOCYTES NFR BLD AUTO: 0 % (ref 0–0.5)
KETONES UR QL STRIP.AUTO: ABNORMAL MG/DL
LDLC SERPL CALC-MCNC: 56.4 MG/DL (ref 0–100)
LEUKOCYTE ESTERASE UR QL STRIP.AUTO: NEGATIVE
LYMPHOCYTES # BLD: 1.5 K/UL (ref 0.8–3.5)
LYMPHOCYTES NFR BLD: 21 % (ref 12–49)
MCH RBC QN AUTO: 31.6 PG (ref 26–34)
MCHC RBC AUTO-ENTMCNC: 31.4 G/DL (ref 30–36.5)
MCV RBC AUTO: 100.7 FL (ref 80–99)
MONOCYTES # BLD: 0.6 K/UL (ref 0–1)
MONOCYTES NFR BLD: 8 % (ref 5–13)
NEUTS SEG # BLD: 5 K/UL (ref 1.8–8)
NEUTS SEG NFR BLD: 68 % (ref 32–75)
NITRITE UR QL STRIP.AUTO: NEGATIVE
NRBC # BLD: 0 K/UL (ref 0–0.01)
NRBC BLD-RTO: 0 PER 100 WBC
PH UR STRIP: 6 (ref 5–8)
PLATELET # BLD AUTO: 163 K/UL (ref 150–400)
PMV BLD AUTO: 11.1 FL (ref 8.9–12.9)
POTASSIUM SERPL-SCNC: 4.4 MMOL/L (ref 3.5–5.1)
PROT SERPL-MCNC: 6.5 G/DL (ref 6.4–8.2)
PROT UR STRIP-MCNC: NEGATIVE MG/DL
RBC # BLD AUTO: 4.34 M/UL (ref 3.8–5.2)
RBC #/AREA URNS HPF: ABNORMAL /HPF (ref 0–5)
SODIUM SERPL-SCNC: 137 MMOL/L (ref 136–145)
SP GR UR REFRACTOMETRY: 1.01 (ref 1–1.03)
SPECIMEN HOLD: NORMAL
T4 FREE SERPL-MCNC: 1.3 NG/DL (ref 0.8–1.5)
TRIGL SERPL-MCNC: 48 MG/DL
TSH SERPL DL<=0.05 MIU/L-ACNC: 0.4 UIU/ML (ref 0.36–3.74)
UROBILINOGEN UR QL STRIP.AUTO: 0.2 EU/DL (ref 0.2–1)
VIT B12 SERPL-MCNC: 405 PG/ML (ref 193–986)
VLDLC SERPL CALC-MCNC: 9.6 MG/DL
WBC # BLD AUTO: 7.3 K/UL (ref 3.6–11)
WBC URNS QL MICRO: ABNORMAL /HPF (ref 0–4)

## 2023-09-06 ENCOUNTER — NURSE ONLY (OUTPATIENT)
Age: 63
End: 2023-09-06
Payer: MEDICARE

## 2023-09-06 DIAGNOSIS — E10.40 TYPE 1 DIABETES MELLITUS WITH DIABETIC NEUROPATHY, UNSPECIFIED (HCC): Primary | ICD-10-CM

## 2023-09-06 LAB — HEMOGLOBIN, POC: 7.2 G/DL

## 2023-09-06 PROCEDURE — 85018 HEMOGLOBIN: CPT

## 2023-09-06 NOTE — PROGRESS NOTES
Patient here for POC HgbA1C , [facility]  Results faxed to [facility] fax # [phone]

## 2023-09-07 LAB
Lab: NORMAL
Lab: NORMAL
REFERENCE LAB: NORMAL

## 2024-03-15 LAB
ESTIMATED AVERAGE GLUCOSE: NORMAL
HBA1C MFR BLD: 8.1 %

## 2024-04-18 ENCOUNTER — TRANSCRIBE ORDERS (OUTPATIENT)
Facility: HOSPITAL | Age: 64
End: 2024-04-18

## 2024-04-18 DIAGNOSIS — N95.9 UNSPECIFIED MENOPAUSAL AND PERIMENOPAUSAL DISORDER: Primary | ICD-10-CM

## 2024-04-19 ENCOUNTER — TRANSCRIBE ORDERS (OUTPATIENT)
Facility: HOSPITAL | Age: 64
End: 2024-04-19

## 2024-04-19 DIAGNOSIS — Z12.31 ENCOUNTER FOR SCREENING MAMMOGRAM FOR MALIGNANT NEOPLASM OF BREAST: Primary | ICD-10-CM

## 2024-04-26 ENCOUNTER — HOSPITAL ENCOUNTER (OUTPATIENT)
Facility: HOSPITAL | Age: 64
End: 2024-04-26
Attending: OBSTETRICS & GYNECOLOGY
Payer: MEDICARE

## 2024-04-26 VITALS — BODY MASS INDEX: 25.07 KG/M2 | WEIGHT: 153 LBS

## 2024-04-26 DIAGNOSIS — N95.9 UNSPECIFIED MENOPAUSAL AND PERIMENOPAUSAL DISORDER: ICD-10-CM

## 2024-04-26 DIAGNOSIS — Z12.31 ENCOUNTER FOR SCREENING MAMMOGRAM FOR MALIGNANT NEOPLASM OF BREAST: ICD-10-CM

## 2024-04-26 PROCEDURE — 77063 BREAST TOMOSYNTHESIS BI: CPT

## 2024-04-26 PROCEDURE — 77080 DXA BONE DENSITY AXIAL: CPT

## 2024-08-26 ENCOUNTER — OFFICE VISIT (OUTPATIENT)
Age: 64
End: 2024-08-26
Payer: MEDICARE

## 2024-08-26 VITALS
HEART RATE: 68 BPM | DIASTOLIC BLOOD PRESSURE: 78 MMHG | RESPIRATION RATE: 18 BRPM | BODY MASS INDEX: 26.25 KG/M2 | WEIGHT: 160.2 LBS | SYSTOLIC BLOOD PRESSURE: 128 MMHG | TEMPERATURE: 97.5 F | OXYGEN SATURATION: 98 %

## 2024-08-26 DIAGNOSIS — J01.10 ACUTE NON-RECURRENT FRONTAL SINUSITIS: Primary | ICD-10-CM

## 2024-08-26 PROCEDURE — G8427 DOCREV CUR MEDS BY ELIG CLIN: HCPCS

## 2024-08-26 PROCEDURE — G8399 PT W/DXA RESULTS DOCUMENT: HCPCS

## 2024-08-26 PROCEDURE — 1123F ACP DISCUSS/DSCN MKR DOCD: CPT

## 2024-08-26 PROCEDURE — 1036F TOBACCO NON-USER: CPT

## 2024-08-26 PROCEDURE — 3078F DIAST BP <80 MM HG: CPT

## 2024-08-26 PROCEDURE — 1090F PRES/ABSN URINE INCON ASSESS: CPT

## 2024-08-26 PROCEDURE — 3017F COLORECTAL CA SCREEN DOC REV: CPT

## 2024-08-26 PROCEDURE — G8419 CALC BMI OUT NRM PARAM NOF/U: HCPCS

## 2024-08-26 PROCEDURE — 99214 OFFICE O/P EST MOD 30 MIN: CPT

## 2024-08-26 PROCEDURE — 3074F SYST BP LT 130 MM HG: CPT

## 2024-08-26 ASSESSMENT — ENCOUNTER SYMPTOMS
SINUS PRESSURE: 1
SHORTNESS OF BREATH: 0
COUGH: 0
SORE THROAT: 0
SINUS PAIN: 1
WHEEZING: 0
GASTROINTESTINAL NEGATIVE: 1

## 2024-08-26 NOTE — PROGRESS NOTES
\"Have you been to the ER, urgent care clinic since your last visit?  Hospitalized since your last visit?\"    NO    “Have you seen or consulted any other health care providers outside of [facility] since your last visit?”    NO      Chief Complaint   Patient presents with    Sinus Problem     Headache/pressure, postnasal drip, diarrhea, fatigue since 8/10/24         Vitals:    08/26/24 1409   BP: 128/78   Pulse: 68   Resp: 18   Temp: 97.5 °F (36.4 °C)   SpO2: (!) 68%         Click Here for Release of Records Request

## 2024-08-26 NOTE — PROGRESS NOTES
Chief Complaint   Patient presents with    Sinus Problem     Headache/pressure, postnasal drip, diarrhea, fatigue since 8/10/24         HPI:     is a 64 y.o. female who presents for an acute visit.      She tested positive for COVID on 8/10, having headache, loss of appetite, and diarrhea; symptoms improved and she tested negative about a week later.  She has now developed sinus pressure, headache, PND, and malaise that began about 1 week ago.  Denies fever, chills, cough, SOB, wheezing.  She is using saline nasal spray and occasional Afrin with temporary relief of her symptoms.    Allergies   Allergen Reactions    Iodine Anaphylaxis    Beta Adrenergic Blockers Diarrhea     METOPROLOL TARTRATE  DIARRHEA AFTER 3 DOSES.    Mixed Ragweed      Other reaction(s): Unknown (comments)    Molds & Smuts      Other reaction(s): Unknown (comments)    Sulfa Antibiotics Angioedema       Current Outpatient Medications   Medication Sig Dispense Refill    amoxicillin-clavulanate (AUGMENTIN) 875-125 MG per tablet Take 1 tablet by mouth 2 times daily for 10 days 20 tablet 0    meloxicam (MOBIC) 7.5 MG tablet TAKE 1 TABLET BY MOUTH EVERY DAY 60 tablet 1    Lancets MISC Check sugar up to 5 times daily, DX: E10.40      Cholecalciferol 50 MCG (2000 UT) TABS Take 1 tablet by mouth daily      glucagon 1 MG injection Inject 1 mg into the muscle as needed      insulin glargine (LANTUS SOLOSTAR) 100 UNIT/ML injection pen 17 units in the morning      levothyroxine (SYNTHROID) 137 MCG tablet 1 tablet 112mcg daily      lisinopril (PRINIVIL;ZESTRIL) 10 MG tablet Take 1 tablet by mouth daily      ALPHA LIPOIC ACID PO Take 2 tablets by mouth daily (Patient not taking: Reported on 8/26/2024)      cyclobenzaprine (FLEXERIL) 10 MG tablet Take 1 tablet by mouth 3 times daily as needed (Patient not taking: Reported on 8/26/2024)      LORazepam (ATIVAN) 0.5 MG tablet Take 1 tablet by mouth daily as needed. Max Daily Amount: 0.5 mg (Patient not  taking: Reported on 8/26/2024)       No current facility-administered medications for this visit.         Past Medical History:   Diagnosis Date    Diabetes ([facility])     H/O colonoscopy 04/13/2019    H/O mammogram 02/08/2021    Hypertension     Hypothyroidism due to Hashimoto's thyroiditis 1999       Family History   Problem Relation Age of Onset    Diabetes Son     Hypertension Father     Hypertension Sister     Heart Disease Father     Diabetes Father     Asthma Father        Review of Systems   Constitutional:  Positive for fatigue. Negative for chills and fever.   HENT:  Positive for postnasal drip, sinus pressure and sinus pain. Negative for ear pain and sore throat.    Respiratory:  Negative for cough, shortness of breath and wheezing.    Gastrointestinal: Negative.    Skin:  Negative for rash.   Neurological:  Positive for headaches.          Vitals:    08/26/24 1409   BP: 128/78   Pulse: 68   Resp: 18   Temp: 97.5 °F (36.4 °C)   SpO2: 98%   Weight: 72.7 kg (160 lb 3.2 oz)        Wt Readings from Last 3 Encounters:   08/26/24 72.7 kg (160 lb 3.2 oz)   04/26/24 69.4 kg (153 lb)   06/29/23 69.8 kg (153 lb 14.4 oz)           6/29/2023     2:10 PM   PHQ-9    Little interest or pleasure in doing things 0   Feeling down, depressed, or hopeless 0   PHQ-2 Score 0   PHQ-9 Total Score 0        Physical Exam  Vitals and nursing note reviewed.   Constitutional:       General: She is not in acute distress.     Appearance: Normal appearance.   HENT:      Head: Normocephalic.      Comments: Periorbital fullness  Frontal sinus tenderness     Right Ear: Ear canal and external ear normal. There is impacted cerumen.      Left Ear: Tympanic membrane, ear canal and external ear normal.      Nose: Nose normal.      Mouth/Throat:      Mouth: Mucous membranes are moist.      Pharynx: No oropharyngeal exudate or posterior oropharyngeal erythema.   Eyes:      Extraocular Movements: Extraocular movements intact.      Conjunctiva/sclera:

## 2025-01-24 LAB — HBA1C MFR BLD HPLC: 7.9 %

## 2025-04-09 ENCOUNTER — HOSPITAL ENCOUNTER (EMERGENCY)
Facility: HOSPITAL | Age: 65
Discharge: HOME | End: 2025-04-10
Attending: FAMILY MEDICINE
Payer: MEDICARE

## 2025-04-09 DIAGNOSIS — E16.2 HYPOGLYCEMIA: Primary | ICD-10-CM

## 2025-04-09 LAB
GLUCOSE BLD STRIP.AUTO-MCNC: 537 MG/DL (ref 65–117)
SERVICE CMNT-IMP: ABNORMAL

## 2025-04-09 PROCEDURE — 82962 GLUCOSE BLOOD TEST: CPT

## 2025-04-09 PROCEDURE — 83690 ASSAY OF LIPASE: CPT

## 2025-04-09 PROCEDURE — 99284 EMERGENCY DEPT VISIT MOD MDM: CPT

## 2025-04-09 PROCEDURE — 83735 ASSAY OF MAGNESIUM: CPT

## 2025-04-09 PROCEDURE — 36415 COLL VENOUS BLD VENIPUNCTURE: CPT

## 2025-04-09 PROCEDURE — 85025 COMPLETE CBC W/AUTO DIFF WBC: CPT

## 2025-04-09 PROCEDURE — 80053 COMPREHEN METABOLIC PANEL: CPT

## 2025-04-09 RX ORDER — 0.9 % SODIUM CHLORIDE 0.9 %
1000 INTRAVENOUS SOLUTION INTRAVENOUS ONCE
Status: COMPLETED | OUTPATIENT
Start: 2025-04-10 | End: 2025-04-10

## 2025-04-09 ASSESSMENT — LIFESTYLE VARIABLES
HOW MANY STANDARD DRINKS CONTAINING ALCOHOL DO YOU HAVE ON A TYPICAL DAY: 1 OR 2
HOW OFTEN DO YOU HAVE A DRINK CONTAINING ALCOHOL: 2-4 TIMES A MONTH

## 2025-04-09 ASSESSMENT — PAIN - FUNCTIONAL ASSESSMENT
PAIN_FUNCTIONAL_ASSESSMENT: 0-10
PAIN_FUNCTIONAL_ASSESSMENT: ACTIVITIES ARE NOT PREVENTED

## 2025-04-09 ASSESSMENT — PAIN SCALES - GENERAL: PAINLEVEL_OUTOF10: 0

## 2025-04-10 VITALS
RESPIRATION RATE: 17 BRPM | TEMPERATURE: 98.6 F | BODY MASS INDEX: 25.71 KG/M2 | DIASTOLIC BLOOD PRESSURE: 61 MMHG | HEART RATE: 75 BPM | WEIGHT: 160 LBS | SYSTOLIC BLOOD PRESSURE: 153 MMHG | HEIGHT: 66 IN | OXYGEN SATURATION: 99 %

## 2025-04-10 LAB
ALBUMIN SERPL-MCNC: 3.7 G/DL (ref 3.5–5)
ALBUMIN/GLOB SERPL: 1.3 (ref 1.1–2.2)
ALP SERPL-CCNC: 70 U/L (ref 45–117)
ALT SERPL-CCNC: 30 U/L (ref 12–78)
ANION GAP SERPL CALC-SCNC: 7 MMOL/L (ref 2–12)
APPEARANCE UR: CLEAR
AST SERPL-CCNC: 22 U/L (ref 15–37)
BACTERIA URNS QL MICRO: ABNORMAL /HPF
BASOPHILS # BLD: 0.05 K/UL (ref 0–0.1)
BASOPHILS NFR BLD: 0.4 % (ref 0–1)
BILIRUB SERPL-MCNC: 0.4 MG/DL (ref 0.2–1)
BILIRUB UR QL: NEGATIVE
BUN SERPL-MCNC: 17 MG/DL (ref 6–20)
BUN/CREAT SERPL: 23 (ref 12–20)
CALCIUM SERPL-MCNC: 8.7 MG/DL (ref 8.5–10.1)
CHLORIDE SERPL-SCNC: 96 MMOL/L (ref 97–108)
CO2 SERPL-SCNC: 29 MMOL/L (ref 21–32)
COLOR UR: ABNORMAL
CREAT SERPL-MCNC: 0.75 MG/DL (ref 0.55–1.02)
DIFFERENTIAL METHOD BLD: ABNORMAL
EOSINOPHIL # BLD: 0.06 K/UL (ref 0–0.4)
EOSINOPHIL NFR BLD: 0.5 % (ref 0–0.7)
EPITH CASTS URNS QL MICRO: ABNORMAL /LPF
ERYTHROCYTE [DISTWIDTH] IN BLOOD BY AUTOMATED COUNT: 12.2 % (ref 11.5–14.5)
GLOBULIN SER CALC-MCNC: 2.8 G/DL (ref 2–4)
GLUCOSE BLD STRIP.AUTO-MCNC: 270 MG/DL (ref 65–117)
GLUCOSE SERPL-MCNC: 316 MG/DL (ref 65–100)
GLUCOSE UR STRIP.AUTO-MCNC: >1000 MG/DL
HCT VFR BLD AUTO: 37.8 % (ref 35–47)
HGB BLD-MCNC: 12.9 G/DL (ref 11.5–16)
HGB UR QL STRIP: NEGATIVE
HYALINE CASTS URNS QL MICRO: ABNORMAL /LPF (ref 0–5)
IMM GRANULOCYTES # BLD AUTO: 0.05 K/UL (ref 0–0.04)
IMM GRANULOCYTES NFR BLD AUTO: 0.4 % (ref 0–0.5)
KETONES UR QL STRIP.AUTO: NEGATIVE MG/DL
LEUKOCYTE ESTERASE UR QL STRIP.AUTO: NEGATIVE
LIPASE SERPL-CCNC: 34 U/L (ref 13–75)
LYMPHOCYTES # BLD: 1.15 K/UL (ref 0.8–3.5)
LYMPHOCYTES NFR BLD: 9.1 % (ref 12–49)
MAGNESIUM SERPL-MCNC: 1.8 MG/DL (ref 1.6–2.4)
MCH RBC QN AUTO: 32.2 PG (ref 26–34)
MCHC RBC AUTO-ENTMCNC: 34.1 G/DL (ref 30–36.5)
MCV RBC AUTO: 94.3 FL (ref 80–99)
MONOCYTES # BLD: 1.25 K/UL (ref 0–1)
MONOCYTES NFR BLD: 9.9 % (ref 5–13)
MUCOUS THREADS URNS QL MICRO: ABNORMAL /LPF
NEUTS SEG # BLD: 10.11 K/UL (ref 1.8–8)
NEUTS SEG NFR BLD: 79.7 % (ref 32–75)
NITRITE UR QL STRIP.AUTO: NEGATIVE
NRBC # BLD: 0 K/UL (ref 0–0.01)
NRBC BLD-RTO: 0 PER 100 WBC
PH UR STRIP: 6 (ref 5–8)
PLATELET # BLD AUTO: 238 K/UL (ref 150–400)
PMV BLD AUTO: 10.3 FL (ref 8.9–12.9)
POTASSIUM SERPL-SCNC: 3.7 MMOL/L (ref 3.5–5.1)
PROT SERPL-MCNC: 6.5 G/DL (ref 6.4–8.2)
PROT UR STRIP-MCNC: NEGATIVE MG/DL
RBC # BLD AUTO: 4.01 M/UL (ref 3.8–5.2)
RBC #/AREA URNS HPF: ABNORMAL /HPF (ref 0–5)
SERVICE CMNT-IMP: ABNORMAL
SODIUM SERPL-SCNC: 132 MMOL/L (ref 136–145)
SP GR UR REFRACTOMETRY: 1.01 (ref 1–1.03)
UROBILINOGEN UR QL STRIP.AUTO: 0.2 EU/DL (ref 0.2–1)
WBC # BLD AUTO: 12.7 K/UL (ref 3.6–11)
WBC URNS QL MICRO: ABNORMAL /HPF (ref 0–4)

## 2025-04-10 PROCEDURE — 82962 GLUCOSE BLOOD TEST: CPT

## 2025-04-10 PROCEDURE — 96360 HYDRATION IV INFUSION INIT: CPT

## 2025-04-10 PROCEDURE — 2580000003 HC RX 258: Performed by: FAMILY MEDICINE

## 2025-04-10 PROCEDURE — 81001 URINALYSIS AUTO W/SCOPE: CPT

## 2025-04-10 RX ADMIN — SODIUM CHLORIDE 1000 ML: 0.9 INJECTION, SOLUTION INTRAVENOUS at 00:31

## 2025-04-10 NOTE — DISCHARGE INSTRUCTIONS
Eat normal meals, return if worse or for change in symptoms.  Follow-up glycemic control with your primary care doctor.

## 2025-04-10 NOTE — ED PROVIDER NOTES
presentation to the ED today includes, but is not limited to, consideration of hypoglycemia secondary to insulin reaction, infection, dietary indiscretion            Disposition Considerations (Tests not done, Shared Decision Making, Pt Expectation of Test or Tx.): Patient was stable during ER visit she and  were comfortable discharge home and adjusting insulin if needed and follow-up with primary care doctor.    FINAL IMPRESSION     1. Hypoglycemia          DISPOSITION/PLAN   DISPOSITION Decision To Discharge 04/10/2025 02:41:22 AM   DISPOSITION CONDITION Stable           Discharge Note: The patient is stable for discharge home. The signs, symptoms, diagnosis, and discharge instructions have been discussed, understanding conveyed, and agreed upon. The patient is to follow up as recommended or return to ER should their symptoms worsen.      PATIENT REFERRED TO:  Yolis Mcdaniels, APRN - NP  [address] [facility] [address] [phone]    Call   follow up todays symptoms, ED visit         DISCHARGE MEDICATIONS:     Medication List        CONTINUE taking these medications      Lancets Misc            ASK your doctor about these medications      ALPHA LIPOIC ACID PO     Cholecalciferol 50 MCG (2000 UT) Tabs     cyclobenzaprine 10 MG tablet  Commonly known as: FLEXERIL     glucagon 1 MG injection     Lantus SoloStar 100 UNIT/ML injection pen  Generic drug: insulin glargine     levothyroxine 137 MCG tablet  Commonly known as: SYNTHROID     lisinopril 10 MG tablet  Commonly known as: PRINIVIL;ZESTRIL     LORazepam 0.5 MG tablet  Commonly known as: ATIVAN     meloxicam 7.5 MG tablet  Commonly known as: MOBIC  TAKE 1 TABLET BY MOUTH EVERY DAY                DISCONTINUED MEDICATIONS:  Discharge Medication List as of 4/10/2025  2:42 AM          I am the Primary Clinician of Record.   Lj Naqvi MD (electronically signed)      (Please note that parts of this dictation were completed with voice

## 2025-05-06 ENCOUNTER — OFFICE VISIT (OUTPATIENT)
Age: 65
End: 2025-05-06
Payer: MEDICARE

## 2025-05-06 VITALS
BODY MASS INDEX: 25.66 KG/M2 | DIASTOLIC BLOOD PRESSURE: 76 MMHG | SYSTOLIC BLOOD PRESSURE: 120 MMHG | HEART RATE: 72 BPM | TEMPERATURE: 97.5 F | OXYGEN SATURATION: 98 % | WEIGHT: 159 LBS | RESPIRATION RATE: 18 BRPM

## 2025-05-06 DIAGNOSIS — M54.31 CHRONIC SCIATICA, RIGHT: ICD-10-CM

## 2025-05-06 DIAGNOSIS — M54.32 CHRONIC SCIATICA, LEFT: ICD-10-CM

## 2025-05-06 DIAGNOSIS — R30.0 DYSURIA: Primary | ICD-10-CM

## 2025-05-06 LAB
BILIRUBIN, URINE, POC: NEGATIVE
BLOOD URINE, POC: NEGATIVE
GLUCOSE URINE, POC: NEGATIVE
KETONES, URINE, POC: NEGATIVE
LEUKOCYTE ESTERASE, URINE, POC: NEGATIVE
NITRITE, URINE, POC: NEGATIVE
PH, URINE, POC: 6 (ref 4.6–8)
PROTEIN,URINE, POC: NEGATIVE
SPECIFIC GRAVITY, URINE, POC: 1.01 (ref 1–1.03)
URINALYSIS CLARITY, POC: CLEAR
URINALYSIS COLOR, POC: YELLOW
UROBILINOGEN, POC: NORMAL

## 2025-05-06 PROCEDURE — 1123F ACP DISCUSS/DSCN MKR DOCD: CPT

## 2025-05-06 PROCEDURE — G8399 PT W/DXA RESULTS DOCUMENT: HCPCS

## 2025-05-06 PROCEDURE — G8427 DOCREV CUR MEDS BY ELIG CLIN: HCPCS

## 2025-05-06 PROCEDURE — 3017F COLORECTAL CA SCREEN DOC REV: CPT

## 2025-05-06 PROCEDURE — G8419 CALC BMI OUT NRM PARAM NOF/U: HCPCS

## 2025-05-06 PROCEDURE — 81003 URINALYSIS AUTO W/O SCOPE: CPT

## 2025-05-06 PROCEDURE — 1090F PRES/ABSN URINE INCON ASSESS: CPT

## 2025-05-06 PROCEDURE — 1125F AMNT PAIN NOTED PAIN PRSNT: CPT

## 2025-05-06 PROCEDURE — 1036F TOBACCO NON-USER: CPT

## 2025-05-06 PROCEDURE — 1159F MED LIST DOCD IN RCRD: CPT

## 2025-05-06 PROCEDURE — 3078F DIAST BP <80 MM HG: CPT

## 2025-05-06 PROCEDURE — 3074F SYST BP LT 130 MM HG: CPT

## 2025-05-06 PROCEDURE — 99214 OFFICE O/P EST MOD 30 MIN: CPT

## 2025-05-06 RX ORDER — GLUCAGON INJECTION, SOLUTION 1 MG/.2ML
INJECTION, SOLUTION SUBCUTANEOUS
COMMUNITY
Start: 2025-04-15

## 2025-05-06 SDOH — ECONOMIC STABILITY: FOOD INSECURITY: WITHIN THE PAST 12 MONTHS, THE FOOD YOU BOUGHT JUST DIDN'T LAST AND YOU DIDN'T HAVE MONEY TO GET MORE.: NEVER TRUE

## 2025-05-06 SDOH — ECONOMIC STABILITY: FOOD INSECURITY: WITHIN THE PAST 12 MONTHS, YOU WORRIED THAT YOUR FOOD WOULD RUN OUT BEFORE YOU GOT MONEY TO BUY MORE.: NEVER TRUE

## 2025-05-06 ASSESSMENT — ENCOUNTER SYMPTOMS
RESPIRATORY NEGATIVE: 1
EYES NEGATIVE: 1
COUGH: 0
WHEEZING: 0
ALLERGIC/IMMUNOLOGIC NEGATIVE: 1
CONSTIPATION: 0
BLOOD IN STOOL: 0
SHORTNESS OF BREATH: 0
DIARRHEA: 0

## 2025-05-06 ASSESSMENT — PATIENT HEALTH QUESTIONNAIRE - PHQ9
SUM OF ALL RESPONSES TO PHQ QUESTIONS 1-9: 0
1. LITTLE INTEREST OR PLEASURE IN DOING THINGS: NOT AT ALL
2. FEELING DOWN, DEPRESSED OR HOPELESS: NOT AT ALL

## 2025-05-06 NOTE — PROGRESS NOTES
Chief Complaint   Patient presents with    Dysuria     X 2 wks        HPI:      History of Present Illness  The patient is a 65-year-old female who presents for an acute visit due to mild urinary symptoms. She reports increased frequency and discomfort when her bladder is full. Cranberry supplements have provided some relief, and she has not used over-the-counter Azo as the symptoms are not severe enough. She consumes diet ginger ale during episodes of stomach upset and occasionally drinks iced tea in the summer but does not consume caffeine. She recalls similar symptoms during perimenopause, which were managed with over-the-counter medications.    Urinary Symptoms  - Onset: Recent  - Location: Bladder  - Character: Increased frequency and discomfort when bladder is full  - Alleviating Factors: Cranberry supplements provide some relief  - Severity: Mild, not severe enough to use over-the-counter Azo    She recently completed a course of physical therapy for her hips and is seeking another referral. She experiences sciatic pain radiating down to her foot, which she attributes to inflammation from hip arthritis. Physical therapy sessions twice a week and home exercises provide significant relief. She has been diagnosed with early-stage osteopenia and receives Euflexxa injections for her knees. Additionally, she experiences left-sided pain and performs piriformis stretches, which help alleviate the discomfort. She has set up a workout area in her garage apartment where she focuses on strengthening her glutes and hip flexors.    Hip and Sciatic Pain  - Onset: Recent  - Location: Hips, radiating down to foot  - Character: Sciatic pain attributed to inflammation from hip arthritis  - Alleviating Factors: Physical therapy sessions twice a week, home exercises, piriformis stretches  - Severity: Significant relief from physical therapy and exercises    On 04/08/2025, she experienced a severe hypoglycemic reaction despite

## 2025-05-06 NOTE — PROGRESS NOTES
Have you been to the ER, urgent care clinic since your last visit?  Hospitalized since your last visit?   NO    Have you seen or consulted any other health care providers outside our system since your last visit?   Endocrinologist Dr Alonso    Chief Complaint   Patient presents with    Dysuria     X 2 wks        Vitals:    05/06/25 1405   BP: 120/76   Pulse: 72   Resp: 18   Temp: 97.5 °F (36.4 °C)   SpO2: 98%        '

## 2025-05-07 LAB
BACTERIA SPEC CULT: NORMAL
SERVICE CMNT-IMP: NORMAL

## 2025-05-09 ENCOUNTER — RESULTS FOLLOW-UP (OUTPATIENT)
Age: 65
End: 2025-05-09

## 2025-05-28 ENCOUNTER — TELEPHONE (OUTPATIENT)
Age: 65
End: 2025-05-28

## 2025-05-28 NOTE — TELEPHONE ENCOUNTER
Left vm to call and schedule appt with Dr Lara for Nueropathy and Ans. The referral came from Dr Geovanna Lim. Please schedule pt when she calls back.

## (undated) DEVICE — CABLE CATH L10FT RED PIN CONN 34-34 FOR THERMOCOOL

## (undated) DEVICE — CATHETER ABLAT 8FR L115CM 1-6-2MM SPC TIP 3.5MM FJ CRV

## (undated) DEVICE — PATCH CARTO 3 EXT REF --

## (undated) DEVICE — HEART CATH-MRMC: Brand: MEDLINE INDUSTRIES, INC.

## (undated) DEVICE — PINNACLE INTRODUCER SHEATH: Brand: PINNACLE

## (undated) DEVICE — CABLE EP L150CM RED HEXAPOLAR OCTAPOLAR DECAPOLAR EXTN CONN

## (undated) DEVICE — CATH QUAD 6F 2/5/2 120CM JSN --

## (undated) DEVICE — REM POLYHESIVE ADULT PATIENT RETURN ELECTRODE: Brand: VALLEYLAB

## (undated) DEVICE — CATH EP CRV 7F DUO 2/8 2M LG -- LIVEWIRE STRL

## (undated) DEVICE — MEDI-TRACE CADENCE ADULT, DEFIBRILLATION ELECTRODE -RTS  (10 PR/PK) - PHYSIO-CONTROL: Brand: MEDI-TRACE CADENCE

## (undated) DEVICE — TUBE SET IRR PUMP THERMALCOOL -- SMARTABLATE

## (undated) DEVICE — CABLE EXT EP H/O/D BLK 150CM --

## (undated) DEVICE — CABLE EP L150CM GRY BPLR QPLR FOR 4FR QPLR CATH SUPREME

## (undated) DEVICE — SHTH GUID 8.5F 22MM MED CRV -- CARTO VIZIGO